# Patient Record
Sex: FEMALE | Employment: FULL TIME | URBAN - NONMETROPOLITAN AREA
[De-identification: names, ages, dates, MRNs, and addresses within clinical notes are randomized per-mention and may not be internally consistent; named-entity substitution may affect disease eponyms.]

---

## 2017-05-01 ENCOUNTER — RADIANT APPOINTMENT (OUTPATIENT)
Dept: GENERAL RADIOLOGY | Facility: CLINIC | Age: 57
End: 2017-05-01
Attending: NURSE PRACTITIONER
Payer: COMMERCIAL

## 2017-05-01 ENCOUNTER — OFFICE VISIT (OUTPATIENT)
Dept: FAMILY MEDICINE | Facility: CLINIC | Age: 57
End: 2017-05-01
Payer: COMMERCIAL

## 2017-05-01 VITALS
RESPIRATION RATE: 14 BRPM | HEART RATE: 82 BPM | SYSTOLIC BLOOD PRESSURE: 136 MMHG | WEIGHT: 144.2 LBS | TEMPERATURE: 97.5 F | DIASTOLIC BLOOD PRESSURE: 84 MMHG | BODY MASS INDEX: 27.25 KG/M2 | OXYGEN SATURATION: 98 %

## 2017-05-01 DIAGNOSIS — Z00.00 WELL FEMALE EXAM WITHOUT GYNECOLOGICAL EXAM: Primary | ICD-10-CM

## 2017-05-01 DIAGNOSIS — Z13.220 SCREENING FOR HYPERLIPIDEMIA: ICD-10-CM

## 2017-05-01 DIAGNOSIS — Z11.59 NEED FOR HEPATITIS C SCREENING TEST: ICD-10-CM

## 2017-05-01 DIAGNOSIS — Z82.61 FAMILY HISTORY OF RHEUMATOID ARTHRITIS: ICD-10-CM

## 2017-05-01 DIAGNOSIS — M79.642 PAIN IN BOTH HANDS: ICD-10-CM

## 2017-05-01 DIAGNOSIS — R21 RASH AND NONSPECIFIC SKIN ERUPTION: ICD-10-CM

## 2017-05-01 DIAGNOSIS — M79.641 PAIN IN BOTH HANDS: ICD-10-CM

## 2017-05-01 DIAGNOSIS — Z12.11 SCREEN FOR COLON CANCER: ICD-10-CM

## 2017-05-01 DIAGNOSIS — Z13.1 SCREENING FOR DIABETES MELLITUS: ICD-10-CM

## 2017-05-01 DIAGNOSIS — Z12.31 ENCOUNTER FOR SCREENING MAMMOGRAM FOR BREAST CANCER: ICD-10-CM

## 2017-05-01 LAB
ALBUMIN SERPL-MCNC: 3.7 G/DL (ref 3.4–5)
ALP SERPL-CCNC: 98 U/L (ref 40–150)
ALT SERPL W P-5'-P-CCNC: 41 U/L (ref 0–50)
ANION GAP SERPL CALCULATED.3IONS-SCNC: 6 MMOL/L (ref 3–14)
AST SERPL W P-5'-P-CCNC: 19 U/L (ref 0–45)
BILIRUB SERPL-MCNC: 0.4 MG/DL (ref 0.2–1.3)
BUN SERPL-MCNC: 14 MG/DL (ref 7–30)
CALCIUM SERPL-MCNC: 8.9 MG/DL (ref 8.5–10.1)
CHLORIDE SERPL-SCNC: 104 MMOL/L (ref 94–109)
CHOLEST SERPL-MCNC: 240 MG/DL
CO2 SERPL-SCNC: 28 MMOL/L (ref 20–32)
CREAT SERPL-MCNC: 0.6 MG/DL (ref 0.52–1.04)
ERYTHROCYTE [DISTWIDTH] IN BLOOD BY AUTOMATED COUNT: 13 % (ref 10–15)
GFR SERPL CREATININE-BSD FRML MDRD: ABNORMAL ML/MIN/1.7M2
GLUCOSE SERPL-MCNC: 119 MG/DL (ref 70–99)
HBA1C MFR BLD: 6.7 % (ref 4.3–6)
HCT VFR BLD AUTO: 39.2 % (ref 35–47)
HCV AB SERPL QL IA: NORMAL
HDLC SERPL-MCNC: 63 MG/DL
HGB BLD-MCNC: 12.8 G/DL (ref 11.7–15.7)
LDLC SERPL CALC-MCNC: 149 MG/DL
MCH RBC QN AUTO: 28.7 PG (ref 26.5–33)
MCHC RBC AUTO-ENTMCNC: 32.7 G/DL (ref 31.5–36.5)
MCV RBC AUTO: 88 FL (ref 78–100)
NONHDLC SERPL-MCNC: 177 MG/DL
PLATELET # BLD AUTO: 361 10E9/L (ref 150–450)
POTASSIUM SERPL-SCNC: 4.3 MMOL/L (ref 3.4–5.3)
PROT SERPL-MCNC: 7.5 G/DL (ref 6.8–8.8)
RBC # BLD AUTO: 4.46 10E12/L (ref 3.8–5.2)
SODIUM SERPL-SCNC: 138 MMOL/L (ref 133–144)
TRIGL SERPL-MCNC: 140 MG/DL
WBC # BLD AUTO: 9.3 10E9/L (ref 4–11)

## 2017-05-01 PROCEDURE — 83036 HEMOGLOBIN GLYCOSYLATED A1C: CPT | Performed by: NURSE PRACTITIONER

## 2017-05-01 PROCEDURE — 80053 COMPREHEN METABOLIC PANEL: CPT | Performed by: NURSE PRACTITIONER

## 2017-05-01 PROCEDURE — 99396 PREV VISIT EST AGE 40-64: CPT | Performed by: NURSE PRACTITIONER

## 2017-05-01 PROCEDURE — 85027 COMPLETE CBC AUTOMATED: CPT | Performed by: NURSE PRACTITIONER

## 2017-05-01 PROCEDURE — 86803 HEPATITIS C AB TEST: CPT | Performed by: NURSE PRACTITIONER

## 2017-05-01 PROCEDURE — 80061 LIPID PANEL: CPT | Performed by: NURSE PRACTITIONER

## 2017-05-01 PROCEDURE — 36415 COLL VENOUS BLD VENIPUNCTURE: CPT | Performed by: NURSE PRACTITIONER

## 2017-05-01 PROCEDURE — 73120 X-RAY EXAM OF HAND: CPT | Mod: LT

## 2017-05-01 NOTE — PROGRESS NOTES
SUBJECTIVE:     CC: Jen Benedict is an 56 year old woman who presents for preventive health visit.     Healthy Habits:    Do you get at least three servings of calcium containing foods daily (dairy, green leafy vegetables, etc.)? yes    Amount of exercise or daily activities, outside of work: 0.5 hour(s) per day    Problems taking medications regularly No    Medication side effects: No    Have you had an eye exam in the past two years? yes    Do you see a dentist twice per year? no    Do you have sleep apnea, excessive snoring or daytime drowsiness?no        Patient is fasting, she would like blood work done.   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    Recent Labs   Lab Test  04/21/15   0940   CHOL  234*   HDL  59   LDL  153*   TRIG  108   CHOLHDLRATIO  4.0       Reviewed orders with patient.  Reviewed health maintenance and updated orders accordingly - Yes    Mammo Decision Support:  Patient over age 50, mutual decision to screen reflected in health maintenance.    Pertinent mammograms are reviewed under the imaging tab.  History of abnormal Pap smear: Status post benign hysterectomy. Health Maintenance and Surgical History updated.    Reviewed and updated as needed this visit by clinical  staff  Tobacco  Allergies  Med Hx  Surg Hx  Fam Hx  Soc Hx        Reviewed and updated as needed this visit by Provider            ROS:  C: NEGATIVE for fever, chills, change in weight  I: NEGATIVE for worrisome rashes, moles or lesions  E: NEGATIVE for vision changes or irritation  ENT: NEGATIVE for ear, mouth and throat problems  R: NEGATIVE for significant cough or SOB  B: NEGATIVE for masses, tenderness or discharge  CV: NEGATIVE for chest pain, palpitations or peripheral edema  GI: NEGATIVE for nausea, abdominal pain, heartburn, or change in bowel habits  : NEGATIVE for unusual urinary or vaginal symptoms. No vaginal bleeding.  M: NEGATIVE for significant arthralgias or myalgia  N: NEGATIVE for weakness, dizziness or paresthesias  P: NEGATIVE for changes in mood or affect     Problem list, Medication list, Allergies, and Medical/Social/Surgical histories reviewed in EPIC and updated as appropriate.  OBJECTIVE:     /84 (BP Location: Right arm, Patient Position: Chair, Cuff Size: Adult Regular)  Pulse 82  Temp 97.5  F (36.4  C) (Tympanic)  Resp 14  Wt 144 lb 3.2 oz (65.4 kg)  SpO2 98%  BMI 27.25 kg/m2  EXAM:  GENERAL APPEARANCE: healthy, alert and no distress  EYES: Eyes grossly normal to inspection, PERRL and conjunctivae and sclerae normal  HENT: ear canals and TM's normal, nose and mouth without ulcers or lesions, oropharynx clear and oral mucous membranes moist  NECK: no adenopathy, no asymmetry, masses, or scars and thyroid normal to palpation  RESP: lungs clear to auscultation - no rales, rhonchi or wheezes  BREAST: normal without masses, tenderness or nipple discharge and no palpable axillary masses or adenopathy  CV: regular rate and rhythm, normal S1 S2, no S3 or S4, no murmur, click or rub, no peripheral edema and peripheral pulses strong  ABDOMEN: soft, nontender, no hepatosplenomegaly, no masses and bowel sounds normal  MS: swelling and pain of PIP joint of middle finger of left  hand   SKIN: very small pin point nodular rash below both eyes   NEURO: Normal strength and tone, sensory exam grossly normal, mentation intact and speech normal  PSYCH: mentation appears normal and affect normal/bright    ASSESSMENT/PLAN:       Patient Instructions   1. Well female exam without gynecological exam      2. Pain in both hands  Xray today   Blood work for RA was negative in 2015  Recommend seeing rheumatologist specifically for this       - XR Hand Bilateral 2 Views; Future  - RHEUMATOLOGY REFERRAL    3. Family history of rheumatoid arthritis  - XR Hand Bilateral 2 Views; Future  - RHEUMATOLOGY REFERRAL    4. Screening for hyperlipidemia  Labs today   - Lipid Profile      5. Screening for diabetes mellitus  Labs today   - Comprehensive metabolic panel  - Hemoglobin A1c    6. Rash and nonspecific skin eruption  See derm  - DERMATOLOGY REFERRAL    7. Screen for colon cancer  Return fit test  - Fecal colorectal cancer screen (FIT); Future    8. Encounter for screening mammogram for breast cancer    [place] [facility] Schedule    [place]- [phone]  Every Other Monday Morning    [place]- [phone]  Every other Monday Afternoon    [facility] [phone]  Every other Monday or Wednesday  & one Saturday a month    [place]- [phone]  Every Monday Morning  Every Tuesday Afternoon  All Day Wed, Thurs, Friday    9. Need for hepatitis C screening test  Labs today   - **Hepatitis C Screen Reflex to RNA FUTURE anytime    Preventive Health Recommendations  Female Ages 50 - 64    Yearly exam: See your health care provider every year in order to  o Review health changes.   o Discuss preventive care.    o Review your medicines if your doctor has prescribed any.      Get a Pap test every three years (unless you have an abnormal result and your provider advises testing more often).    If you get Pap tests with HPV test, you only need to test every 5 years, unless you have an abnormal result.     You do not  "need a Pap test if your uterus was removed (hysterectomy) and you have not had cancer.    You should be tested each year for STDs (sexually transmitted diseases) if you're at risk.     Have a mammogram every 1 to 2 years.    Have a colonoscopy at age 50, or have a yearly FIT test (stool test). These exams screen for colon cancer.      Have a cholesterol test every 5 years, or more often if advised.    Have a diabetes test (fasting glucose) every three years. If you are at risk for diabetes, you should have this test more often.     If you are at risk for osteoporosis (brittle bone disease), think about having a bone density scan (DEXA).    Shots: Get a flu shot each year. Get a tetanus shot every 10 years.    Nutrition:     Eat at least 5 servings of fruits and vegetables each day.    Eat whole-grain bread, whole-wheat pasta and brown rice instead of white grains and rice.    Talk to your provider about Calcium and Vitamin D.     Lifestyle    Exercise at least 150 minutes a week (30 minutes a day, 5 days a week). This will help you control your weight and prevent disease.    Limit alcohol to one drink per day.    No smoking.     Wear sunscreen to prevent skin cancer.     See your dentist every six months for an exam and cleaning.    See your eye doctor every 1 to 2 years.          COUNSELING:   Reviewed preventive health counseling, as reflected in patient instructions       Regular exercise       Healthy diet/nutrition       Colon cancer screening       Consider Hep C screening for patients born between 1945 and 1965         reports that she has never smoked. She has never used smokeless tobacco.    Estimated body mass index is 27.25 kg/(m^2) as calculated from the following:    Height as of 11/16/15: 5' 1\" (1.549 m).    Weight as of this encounter: 144 lb 3.2 oz (65.4 kg).   Weight management plan: Discussed healthy diet and exercise guidelines and patient will follow up in 3 months in clinic to " re-evaluate.    Counseling Resources:  ATP IV Guidelines  Pooled Cohorts Equation Calculator  Breast Cancer Risk Calculator  FRAX Risk Assessment  ICSI Preventive Guidelines  Dietary Guidelines for Americans, 2010  USDA's MyPlate  ASA Prophylaxis  Lung CA Screening    Elin Arboleda NP  [facility]

## 2017-05-01 NOTE — MR AVS SNAPSHOT
After Visit Summary   5/1/2017    Jen Benedict    MRN: [id number]           Patient Information     Date Of Birth          1960        Visit Information        Provider Department      5/1/2017 7:40 AM Elin Arboleda NP [facility]        Today's Diagnoses     Well female exam without gynecological exam    -  1    Pain in both hands        Family history of rheumatoid arthritis        Screening for hyperlipidemia        Screening for diabetes mellitus        Rash and nonspecific skin eruption        Screen for colon cancer        Encounter for screening mammogram for breast cancer        Need for hepatitis C screening test          Care Instructions    1. Well female exam without gynecological exam      2. Pain in both hands  Xray today   Blood work for RA was negative in 2015  Recommend seeing rheumatologist specifically for this       - XR Hand Bilateral 2 Views; Future  - RHEUMATOLOGY REFERRAL    3. Family history of rheumatoid arthritis  - XR Hand Bilateral 2 Views; Future  - RHEUMATOLOGY REFERRAL    4. Screening for hyperlipidemia  Labs today   - Lipid Profile      5. Screening for diabetes mellitus  Labs today   - Comprehensive metabolic panel  - Hemoglobin A1c    6. Rash and nonspecific skin eruption  See derm  - DERMATOLOGY REFERRAL    7. Screen for colon cancer  Return fit test  - Fecal colorectal cancer screen (FIT); Future    8. Encounter for screening mammogram for breast cancer    [place] [facility] Schedule    [place]- [phone]  Every Other Monday Morning    [place]- [phone]  Every other Monday Afternoon    [place] ~ [phone]  Every other Monday or Wednesday  & one Saturday a month    [place]- [phone]  Every Monday Morning  Every Tuesday Afternoon  All Day Wed, Thurs, Friday    9. Need for hepatitis C screening test  Labs today   - **Hepatitis C Screen Reflex to RNA FUTURE anytime    Preventive Health Recommendations  Female Ages 50 -  64    Yearly exam: See your health care provider every year in order to  o Review health changes.   o Discuss preventive care.    o Review your medicines if your doctor has prescribed any.      Get a Pap test every three years (unless you have an abnormal result and your provider advises testing more often).    If you get Pap tests with HPV test, you only need to test every 5 years, unless you have an abnormal result.     You do not need a Pap test if your uterus was removed (hysterectomy) and you have not had cancer.    You should be tested each year for STDs (sexually transmitted diseases) if you're at risk.     Have a mammogram every 1 to 2 years.    Have a colonoscopy at age 50, or have a yearly FIT test (stool test). These exams screen for colon cancer.      Have a cholesterol test every 5 years, or more often if advised.    Have a diabetes test (fasting glucose) every three years. If you are at risk for diabetes, you should have this test more often.     If you are at risk for osteoporosis (brittle bone disease), think about having a bone density scan (DEXA).    Shots: Get a flu shot each year. Get a tetanus shot every 10 years.    Nutrition:     Eat at least 5 servings of fruits and vegetables each day.    Eat whole-grain bread, whole-wheat pasta and brown rice instead of white grains and rice.    Talk to your provider about Calcium and Vitamin D.     Lifestyle    Exercise at least 150 minutes a week (30 minutes a day, 5 days a week). This will help you control your weight and prevent disease.    Limit alcohol to one drink per day.    No smoking.     Wear sunscreen to prevent skin cancer.     See your dentist every six months for an exam and cleaning.    See your eye doctor every 1 to 2 years.          Follow-ups after your visit        Additional Services     DERMATOLOGY REFERRAL       Your provider has referred you to: [facility]: [facility] [phone]    [email or web address]    Please be aware that coverage of these services is subject to the terms and limitations of your health insurance plan.  Call member services at your health plan with any benefit or coverage questions.      Please bring the following with you to your appointment:    (1) Any X-Rays, CTs or MRIs which have been performed.  Contact the facility where they were done to arrange for  prior to your scheduled appointment.    (2) List of current medications  (3) This referral request   (4) Any documents/labs given to you for this referral            RHEUMATOLOGY REFERRAL       Your provider has referred you to: [facility]: [facility] [phone]   [email or web address]    Please be aware that coverage of these services is subject to the terms and limitations of your health insurance plan.  Call member services at your health plan with any benefit or coverage questions.      Please bring the following with you to your appointment:    (1) Any X-Rays, CTs or MRIs which have been performed.  Contact the facility where they were done to arrange for  prior to your scheduled appointment.    (2) List of current medications   (3) This referral request   (4) Any documents/labs given to you for this referral                  Future tests that were ordered for you today     Open Future Orders        Priority Expected Expires Ordered    Fecal colorectal cancer screen (FIT) Routine 5/22/2017 7/24/2017 5/1/2017    XR Hand Bilateral 2 Views Routine 5/1/2017 5/1/2018 5/1/2017            Who to contact     If you have questions or need follow up information about today's clinic visit or your schedule please contact [facility] directly at [phone].  Normal or non-critical lab and imaging results will be communicated to you by MyChart, letter or phone within 4 business days after the clinic has received the results. If you do  not hear from us within 7 days, please contact the clinic through alike or phone. If you have a critical or abnormal lab result, we will notify you by phone as soon as possible.  Submit refill requests through alike or call your pharmacy and they will forward the refill request to us. Please allow 3 business days for your refill to be completed.          Additional Information About Your Visit        [employer] Information     alike gives you secure access to your electronic health record. If you see a primary care provider, you can also send messages to your care team and make appointments. If you have questions, please call your primary care clinic.  If you do not have a primary care provider, please call [phone] and they will assist you.        Care EveryWhere ID     This is your Care EveryWhere ID. This could be used by other organizations to access your [facility] medical records  [id number]        Your Vitals Were     Pulse Temperature Respirations Pulse Oximetry BMI (Body Mass Index)       82 97.5  F (36.4  C) (Tympanic) 14 98% 27.25 kg/m2        Blood Pressure from Last 3 Encounters:   05/01/17 136/84   11/16/15 134/78   11/16/15 118/67    Weight from Last 3 Encounters:   05/01/17 144 lb 3.2 oz (65.4 kg)   11/16/15 136 lb 12.8 oz (62.1 kg)   10/01/15 135 lb 4 oz (61.3 kg)              We Performed the Following     **Hepatitis C Screen Reflex to RNA FUTURE anytime     CBC with platelets     Comprehensive metabolic panel     DERMATOLOGY REFERRAL     Hemoglobin A1c     Lipid Profile     RHEUMATOLOGY REFERRAL        Primary Care Provider Office Phone # Fax #    Barbara Washington [phone]       [address] [address]        Thank you!     Thank you for choosing [facility]  for your care. Our goal is always to provide you with excellent care. Hearing back from our patients is one way we can continue to improve our  services. Please take a few minutes to complete the written survey that you may receive in the mail after your visit with us. Thank you!             Your Updated Medication List - Protect others around you: Learn how to safely use, store and throw away your medicines at www.disposemymeds.org.          This list is accurate as of: 5/1/17  8:19 AM.  Always use your most recent med list.                   Brand Name Dispense Instructions for use    CALCIUM + D PO      Take 1,200 mg by mouth daily       omega 3 1000 MG Caps     90 capsule    Take 1 g by mouth 2 times daily

## 2017-05-01 NOTE — PATIENT INSTRUCTIONS
1. Well female exam without gynecological exam      2. Pain in both hands  Xray today   Blood work for RA was negative in 2015  Recommend seeing rheumatologist specifically for this       - XR Hand Bilateral 2 Views; Future  - RHEUMATOLOGY REFERRAL    3. Family history of rheumatoid arthritis  - XR Hand Bilateral 2 Views; Future  - RHEUMATOLOGY REFERRAL    4. Screening for hyperlipidemia  Labs today   - Lipid Profile      5. Screening for diabetes mellitus  Labs today   - Comprehensive metabolic panel  - Hemoglobin A1c    6. Rash and nonspecific skin eruption  See derm  - DERMATOLOGY REFERRAL    7. Screen for colon cancer  Return fit test  - Fecal colorectal cancer screen (FIT); Future    8. Encounter for screening mammogram for breast cancer    [place] [facility] Schedule    [place]- [phone]  Every Other Monday Morning    [place]- [phone]  Every other Monday Afternoon    [facility] [phone]  Every other Monday or Wednesday  & one Saturday a month    [place]- [phone]  Every Monday Morning  Every Tuesday Afternoon  All Day Wed, Thurs, Friday    9. Need for hepatitis C screening test  Labs today   - **Hepatitis C Screen Reflex to RNA FUTURE anytime    Preventive Health Recommendations  Female Ages 50 - 64    Yearly exam: See your health care provider every year in order to  o Review health changes.   o Discuss preventive care.    o Review your medicines if your doctor has prescribed any.      Get a Pap test every three years (unless you have an abnormal result and your provider advises testing more often).    If you get Pap tests with HPV test, you only need to test every 5 years, unless you have an abnormal result.     You do not need a Pap test if your uterus was removed (hysterectomy) and you have not had cancer.    You should be tested each year for STDs (sexually transmitted diseases) if you're at risk.     Have a mammogram every 1 to 2 years.    Have a colonoscopy at age 50, or have a  yearly FIT test (stool test). These exams screen for colon cancer.      Have a cholesterol test every 5 years, or more often if advised.    Have a diabetes test (fasting glucose) every three years. If you are at risk for diabetes, you should have this test more often.     If you are at risk for osteoporosis (brittle bone disease), think about having a bone density scan (DEXA).    Shots: Get a flu shot each year. Get a tetanus shot every 10 years.    Nutrition:     Eat at least 5 servings of fruits and vegetables each day.    Eat whole-grain bread, whole-wheat pasta and brown rice instead of white grains and rice.    Talk to your provider about Calcium and Vitamin D.     Lifestyle    Exercise at least 150 minutes a week (30 minutes a day, 5 days a week). This will help you control your weight and prevent disease.    Limit alcohol to one drink per day.    No smoking.     Wear sunscreen to prevent skin cancer.     See your dentist every six months for an exam and cleaning.    See your eye doctor every 1 to 2 years.

## 2017-05-04 PROCEDURE — 82274 ASSAY TEST FOR BLOOD FECAL: CPT | Performed by: NURSE PRACTITIONER

## 2017-05-05 ENCOUNTER — OFFICE VISIT (OUTPATIENT)
Dept: FAMILY MEDICINE | Facility: CLINIC | Age: 57
End: 2017-05-05
Payer: COMMERCIAL

## 2017-05-05 VITALS
WEIGHT: 144 LBS | TEMPERATURE: 98.8 F | HEART RATE: 81 BPM | OXYGEN SATURATION: 95 % | RESPIRATION RATE: 18 BRPM | SYSTOLIC BLOOD PRESSURE: 132 MMHG | BODY MASS INDEX: 27.21 KG/M2 | DIASTOLIC BLOOD PRESSURE: 80 MMHG

## 2017-05-05 DIAGNOSIS — E11.9 TYPE 2 DIABETES MELLITUS WITHOUT COMPLICATION, WITHOUT LONG-TERM CURRENT USE OF INSULIN (H): Primary | ICD-10-CM

## 2017-05-05 DIAGNOSIS — Z12.11 SCREEN FOR COLON CANCER: ICD-10-CM

## 2017-05-05 LAB
CREAT UR-MCNC: 117 MG/DL
HEMOCCULT STL QL IA: NEGATIVE
MICROALBUMIN UR-MCNC: 9 MG/L
MICROALBUMIN/CREAT UR: 7.31 MG/G CR (ref 0–25)

## 2017-05-05 PROCEDURE — 90732 PPSV23 VACC 2 YRS+ SUBQ/IM: CPT | Performed by: NURSE PRACTITIONER

## 2017-05-05 PROCEDURE — 90471 IMMUNIZATION ADMIN: CPT | Performed by: NURSE PRACTITIONER

## 2017-05-05 PROCEDURE — 99207 C FOOT EXAM  NO CHARGE: CPT | Performed by: NURSE PRACTITIONER

## 2017-05-05 PROCEDURE — 82043 UR ALBUMIN QUANTITATIVE: CPT | Performed by: NURSE PRACTITIONER

## 2017-05-05 PROCEDURE — 99213 OFFICE O/P EST LOW 20 MIN: CPT | Mod: 25 | Performed by: NURSE PRACTITIONER

## 2017-05-05 RX ORDER — LISINOPRIL 2.5 MG/1
2.5 TABLET ORAL DAILY
Qty: 90 TABLET | Refills: 1 | Status: SHIPPED | OUTPATIENT
Start: 2017-05-05 | End: 2017-05-11

## 2017-05-05 RX ORDER — ATORVASTATIN CALCIUM 10 MG/1
10 TABLET, FILM COATED ORAL DAILY
Qty: 90 TABLET | Refills: 3 | Status: SHIPPED | OUTPATIENT
Start: 2017-05-05 | End: 2017-11-27

## 2017-05-05 NOTE — MR AVS SNAPSHOT
After Visit Summary   5/5/2017    Jen Benedict    MRN: [id number]           Patient Information     Date Of Birth          1960        Visit Information        Provider Department      5/5/2017 2:00 PM Elin Arboleda NP [facility]        Today's Diagnoses     Type 2 diabetes mellitus without complication, without long-term current use of insulin (H)    -  1      Care Instructions    Recommend getting an eye exam when you are due- send copy of results     Will get urine today to look for protein     Start low dose of lisinopril 2.5 mg to protect kidneys take in AM   Check blood pressure in 2 weeks at home     Start low dose of cholesterol medication to prevent heart attack and stroke   atorvastatin 10 mg daily at bedtime    Work hard on diet and exercise to help control the diabetes    Check blood sugar 2 times a week in AM     Goal is to be below 150     See me back in 3 months   Come fasting for lab work               Follow-ups after your visit        Who to contact     If you have questions or need follow up information about today's clinic visit or your schedule please contact [facility] directly at [phone].  Normal or non-critical lab and imaging results will be communicated to you by creadshart, letter or phone within 4 business days after the clinic has received the results. If you do not hear from us within 7 days, please contact the clinic through creadshart or phone. If you have a critical or abnormal lab result, we will notify you by phone as soon as possible.  Submit refill requests through [employer] or call your pharmacy and they will forward the refill request to us. Please allow 3 business days for your refill to be completed.          Additional Information About Your Visit        MyChart Information     [employer] gives you secure access to your electronic health record. If you see a primary care provider, you can also send messages to your care  team and make appointments. If you have questions, please call your primary care clinic.  If you do not have a primary care provider, please call [phone] and they will assist you.        Care EveryWhere ID     This is your Care EveryWhere ID. This could be used by other organizations to access your [facility] medical records  [id number]        Your Vitals Were     Pulse Temperature Respirations Pulse Oximetry BMI (Body Mass Index)       81 98.8  F (37.1  C) (Tympanic) 18 95% 27.21 kg/m2        Blood Pressure from Last 3 Encounters:   05/05/17 132/80   05/01/17 136/84   11/16/15 134/78    Weight from Last 3 Encounters:   05/05/17 144 lb (65.3 kg)   05/01/17 144 lb 3.2 oz (65.4 kg)   11/16/15 136 lb 12.8 oz (62.1 kg)              Today, you had the following     No orders found for display         Today's Medication Changes          These changes are accurate as of: 5/5/17  2:29 PM.  If you have any questions, ask your nurse or doctor.               Start taking these medicines.        Dose/Directions    atorvastatin 10 MG tablet   Commonly known as:  LIPITOR   Used for:  Type 2 diabetes mellitus without complication, without long-term current use of insulin (H)   Started by:  Elin Arboleda NP        Dose:  10 mg   Take 1 tablet (10 mg) by mouth daily   Quantity:  90 tablet   Refills:  3       lisinopril 2.5 MG tablet   Commonly known as:  PRINIVIL/Zestril   Used for:  Type 2 diabetes mellitus without complication, without long-term current use of insulin (H)   Started by:  Elin Arboleda NP        Dose:  2.5 mg   Take 1 tablet (2.5 mg) by mouth daily   Quantity:  90 tablet   Refills:  1            Where to get your medicines      These medications were sent to [facility] [address] [place] [address]  [address] [facility] [address]     Phone:  [phone]     atorvastatin 10 MG tablet    lisinopril 2.5 MG tablet                Primary Care Provider Office Phone # Fax #    Elin  JINA Arboleda [phone]       [facility] [address]  [facility] [address]        Thank you!     Thank you for choosing [facility]  for your care. Our goal is always to provide you with excellent care. Hearing back from our patients is one way we can continue to improve our services. Please take a few minutes to complete the written survey that you may receive in the mail after your visit with us. Thank you!             Your Updated Medication List - Protect others around you: Learn how to safely use, store and throw away your medicines at www.disposemymeds.org.          This list is accurate as of: 5/5/17  2:29 PM.  Always use your most recent med list.                   Brand Name Dispense Instructions for use    atorvastatin 10 MG tablet    LIPITOR    90 tablet    Take 1 tablet (10 mg) by mouth daily       CALCIUM + D PO      Take 1,200 mg by mouth daily       lisinopril 2.5 MG tablet    PRINIVIL/Zestril    90 tablet    Take 1 tablet (2.5 mg) by mouth daily       omega 3 1000 MG Caps     90 capsule    Take 1 g by mouth 2 times daily

## 2017-05-05 NOTE — PATIENT INSTRUCTIONS
Recommend getting an eye exam when you are due- send copy of results     Will get urine today to look for protein     Start low dose of lisinopril 2.5 mg to protect kidneys take in AM   Check blood pressure in 2 weeks at home     Start low dose of cholesterol medication to prevent heart attack and stroke   atorvastatin 10 mg daily at bedtime    Work hard on diet and exercise to help control the diabetes    Check blood sugar 2 times a week in AM     Goal is to be below 150     See me back in 3 months   Come fasting for lab work

## 2017-05-05 NOTE — NURSING NOTE
"Chief Complaint   Patient presents with     Results       Initial /80 (BP Location: Right arm, Patient Position: Chair, Cuff Size: Adult Regular)  Pulse 81  Temp 98.8  F (37.1  C) (Tympanic)  Resp 18  Wt 144 lb (65.3 kg)  SpO2 95%  BMI 27.21 kg/m2 Estimated body mass index is 27.21 kg/(m^2) as calculated from the following:    Height as of 11/16/15: 5' 1\" (1.549 m).    Weight as of this encounter: 144 lb (65.3 kg).  Medication Reconciliation: complete    Health Maintenance that is potentially due pending provider review:    Foot exam, eye exam, microalbumin, pneumovax, fit and tsh  Will discuss with provider.      "

## 2017-05-05 NOTE — NURSING NOTE
Screening Questionnaire for Adult Immunization    Are you sick today?   No   Do you have allergies to medications, food, a vaccine component or latex?   No   Have you ever had a serious reaction after receiving a vaccination?   No   Do you have a long-term health problem with heart disease, lung disease, asthma, kidney disease, metabolic disease (e.g. diabetes), anemia, or other blood disorder?   diabetes   Do you have cancer, leukemia, HIV/AIDS, or any other immune system problem?   No   In the past 3 months, have you taken medications that affect  your immune system, such as prednisone, other steroids, or anticancer drugs; drugs for the treatment of rheumatoid arthritis, Crohn s disease, or psoriasis; or have you had radiation treatments?   No   Have you had a seizure, or a brain or other nervous system problem?   No   During the past year, have you received a transfusion of blood or blood     products, or been given immune (gamma) globulin or antiviral drug?   No   For women: Are you pregnant or is there a chance you could become        pregnant during the next month?   No   Have you received any vaccinations in the past 4 weeks?   No     Immunization questionnaire was positive for at least one answer.  Notified Elin Mayer.      MNVFC doesn't apply on this patient    Per orders of Dr. Elin Arboleda, injection of Pneumovaxx 23 given by Leilani Martini. Patient instructed to remain in clinic for 20 minutes afterwards, and to report any adverse reaction to me immediately.       Screening performed by Leilani Martini on 5/5/2017 at 2:31 PM.

## 2017-05-05 NOTE — PROGRESS NOTES
SUBJECTIVE:                                                    Jen Benedict is a 56 year old female who presents to clinic today for the following health issues:      Here to go over lab results from 5/1/17    New diabetic   Has been in prediabetic range   Has seen diabetic education through [facility]   States she feels she has knowledge it is now a matter of applying it    Lab Results   Component Value Date    A1C 6.7 05/01/2017         Problem list and histories reviewed & adjusted, as indicated.  Additional history: as documented    Labs reviewed in EPIC    Reviewed and updated as needed this visit by clinical staff  Tobacco  Allergies  Med Hx  Surg Hx  Fam Hx  Soc Hx      Reviewed and updated as needed this visit by Provider         ROS:  Constitutional, HEENT, cardiovascular, pulmonary, gi and gu systems are negative, except as otherwise noted.    OBJECTIVE:                                                    /80 (BP Location: Right arm, Patient Position: Chair, Cuff Size: Adult Regular)  Pulse 81  Temp 98.8  F (37.1  C) (Tympanic)  Resp 18  Wt 144 lb (65.3 kg)  SpO2 95%  BMI 27.21 kg/m2  Body mass index is 27.21 kg/(m^2).  GENERAL APPEARANCE: healthy, alert and no distress  DIABETIC FOOT EXAM: normal DP and PT pulses, no trophic changes or ulcerative lesions and normal sensory exam    Diagnostic test results:  Diagnostic Test Results:  Results for orders placed or performed in visit on 05/05/17   ** Albumin Random Urine Quant FUTURE 1yr   Result Value Ref Range    Creatinine Urine 117 mg/dL    Albumin Urine mg/L 9 mg/L    Albumin Urine mg/g Cr 7.31 0 - 25 mg/g Cr        ASSESSMENT/PLAN:                                                    1. Type 2 diabetes mellitus without complication, without long-term current use of insulin (H)  a1c is with in goal   Spent today discussing statin and ace recommendation   Also discussed metformin which she prefers to hold off on for now   She plans on  working on diet and exercise   Declined seeing diabetic educator today   Plan will be to see her back in 3 months for a recheck   Pneumonia vaccine given today   Will start ace and statin   - atorvastatin (LIPITOR) 10 MG tablet; Take 1 tablet (10 mg) by mouth daily  Dispense: 90 tablet; Refill: 3  - lisinopril (PRINIVIL/ZESTRIL) 2.5 MG tablet; Take 1 tablet (2.5 mg) by mouth daily  Dispense: 90 tablet; Refill: 1  - ** Albumin Random Urine Quant FUTURE 1yr  - blood glucose monitoring (NO BRAND SPECIFIED) test strip; Use to test blood sugars daily or as directed  Dispense: 100 strip; Refill: 3  - Pneumococcal vaccine 23 valent PPSV23  (Pneumovax) [68937]      Patient Instructions   Recommend getting an eye exam when you are due- send copy of results     Will get urine today to look for protein     Start low dose of lisinopril 2.5 mg to protect kidneys take in AM   Check blood pressure in 2 weeks at home     Start low dose of cholesterol medication to prevent heart attack and stroke   atorvastatin 10 mg daily at bedtime    Work hard on diet and exercise to help control the diabetes    Check blood sugar 2 times a week in AM     Goal is to be below 150     See me back in 3 months   Come fasting for lab work             Elin Arboleda NP  [facility]    The 10-year ASCVD risk score (Mariibhavik CARMONA Jr, et al., 2013) is: 3%    Values used to calculate the score:      Age: 56 years      Sex: Female      Is Non- : No      Diabetic: No      Tobacco smoker: No      Systolic Blood Pressure: 132 mmHg      Is BP treated: No      HDL Cholesterol: 63 mg/dL      Total Cholesterol: 240 mg/dL

## 2017-05-10 ENCOUNTER — TELEPHONE (OUTPATIENT)
Dept: FAMILY MEDICINE | Facility: CLINIC | Age: 57
End: 2017-05-10

## 2017-05-10 ENCOUNTER — MYC MEDICAL ADVICE (OUTPATIENT)
Dept: FAMILY MEDICINE | Facility: CLINIC | Age: 57
End: 2017-05-10

## 2017-05-10 DIAGNOSIS — E11.9 TYPE 2 DIABETES MELLITUS WITHOUT COMPLICATION, WITHOUT LONG-TERM CURRENT USE OF INSULIN (H): Primary | ICD-10-CM

## 2017-05-10 NOTE — TELEPHONE ENCOUNTER
Patient called stating that pharmacy has not received her prescription and would like it faxed over again.    Ilya WAGNER  Central Scheduling

## 2017-05-11 ENCOUNTER — MYC MEDICAL ADVICE (OUTPATIENT)
Dept: FAMILY MEDICINE | Facility: CLINIC | Age: 57
End: 2017-05-11

## 2017-05-11 DIAGNOSIS — E11.9 TYPE 2 DIABETES MELLITUS WITHOUT COMPLICATION, WITHOUT LONG-TERM CURRENT USE OF INSULIN (H): Primary | ICD-10-CM

## 2017-05-11 RX ORDER — LOSARTAN POTASSIUM 25 MG/1
12.5 TABLET ORAL DAILY
Qty: 45 TABLET | Refills: 1 | Status: SHIPPED | OUTPATIENT
Start: 2017-05-11 | End: 2017-05-22

## 2017-05-11 NOTE — TELEPHONE ENCOUNTER
Spoke with pt who reports onset dry tickle cough with start of lisinopril per 05-05-17 OV.  Please advise.  KRISTY Swenson RN

## 2017-05-11 NOTE — TELEPHONE ENCOUNTER
Lancets      Last Written Prescription Date: not on med list  Last Fill Quantity: ?,  # refills: ?   Last Office Visit with [facility], [facility] or [facility] prescribing provider: 5/5/2017

## 2017-05-14 ENCOUNTER — MYC MEDICAL ADVICE (OUTPATIENT)
Dept: FAMILY MEDICINE | Facility: CLINIC | Age: 57
End: 2017-05-14

## 2017-05-15 NOTE — TELEPHONE ENCOUNTER
Spoke with pt who states dry, tickle cough about the same after switching from lisinopril to losartan 05-12-17.   States cough us random occurring at various times of day/ hunter.  States does have allergy sx, denies URI/ sinus sx.  Currently in [place] until end of month. Advised to continue losartan, check in with nurse again in 1 wk or sooner as needed to see if cough persists.  States may be able to check BP in 1 wk at when visiting sister.  Nurse only BP check when returns home end of month.   Pt voices understanding/ agreement.  Await pt call back/ [employer] message in 1 wk.  KRISTY Swenson RN

## 2017-05-22 ENCOUNTER — TELEPHONE (OUTPATIENT)
Dept: FAMILY MEDICINE | Facility: CLINIC | Age: 57
End: 2017-05-22

## 2017-05-22 DIAGNOSIS — E11.9 TYPE 2 DIABETES MELLITUS WITHOUT COMPLICATION, WITHOUT LONG-TERM CURRENT USE OF INSULIN (H): Primary | ICD-10-CM

## 2017-05-22 NOTE — TELEPHONE ENCOUNTER
Jen calling stating still having tickle cough and would like to stop taking Losartan 25mg. States will be back to [place] Monday 06.05.2017. Please call    Holly Marti CSS

## 2017-05-22 NOTE — TELEPHONE ENCOUNTER
Per 05-14-17 OV-    Spoke with pt who states dry, tickle cough about the same after switching from lisinopril to losartan 05-12-17.   States cough us random occurring at various times of day/ hunter.  States does have allergy sx, denies URI/ sinus sx.  Currently in [place] until end of month. Advised to continue losartan, check in with nurse again in 1 wk or sooner as needed to see if cough persists.  States may be able to check BP in 1 wk at when visiting sister.  Nurse only BP check when returns home end of month.   Pt voices understanding/ agreement.  Await pt call back/ [employer] message in 1 wk.  KRISTY Swenson RN      Pt calling back to report persistent tickle cough after changing from lisinopril to losartan.  Will be back to [place] 06-05-17.  Please advise.  KRISTY Swenson RN

## 2017-06-02 ENCOUNTER — OFFICE VISIT (OUTPATIENT)
Dept: FAMILY MEDICINE | Facility: CLINIC | Age: 57
End: 2017-06-02
Payer: COMMERCIAL

## 2017-06-02 VITALS
OXYGEN SATURATION: 97 % | BODY MASS INDEX: 24.94 KG/M2 | HEART RATE: 77 BPM | DIASTOLIC BLOOD PRESSURE: 80 MMHG | TEMPERATURE: 99 F | WEIGHT: 132 LBS | SYSTOLIC BLOOD PRESSURE: 130 MMHG | RESPIRATION RATE: 16 BRPM

## 2017-06-02 DIAGNOSIS — T46.4X5A COUGH DUE TO ACE INHIBITOR: ICD-10-CM

## 2017-06-02 DIAGNOSIS — E11.9 TYPE 2 DIABETES MELLITUS WITHOUT COMPLICATION, WITHOUT LONG-TERM CURRENT USE OF INSULIN (H): Primary | ICD-10-CM

## 2017-06-02 DIAGNOSIS — R05.8 COUGH DUE TO ACE INHIBITOR: ICD-10-CM

## 2017-06-02 PROCEDURE — 99213 OFFICE O/P EST LOW 20 MIN: CPT | Performed by: NURSE PRACTITIONER

## 2017-06-02 NOTE — NURSING NOTE
"Chief Complaint   Patient presents with     Medication Problem       Initial BP (!) 152/94 (BP Location: Right arm, Patient Position: Chair, Cuff Size: Adult Regular)  Pulse 77  Temp 99  F (37.2  C) (Tympanic)  Resp 16  Wt 132 lb (59.9 kg)  SpO2 97%  BMI 24.94 kg/m2 Estimated body mass index is 24.94 kg/(m^2) as calculated from the following:    Height as of 11/16/15: 5' 1\" (1.549 m).    Weight as of this encounter: 132 lb (59.9 kg).  Medication Reconciliation: complete    Health Maintenance that is potentially due pending provider review:  Eye exam, tsh    Will discuss with provider.      "

## 2017-06-02 NOTE — PATIENT INSTRUCTIONS
Continue on the atorvastatin only today     Let me know if cough does not completely resolve in 2-3 weeks.      See me back in August       Keep up the hard work with diet and exercise

## 2017-06-02 NOTE — MR AVS SNAPSHOT
After Visit Summary   6/2/2017    Jen Benedict    MRN: [id number]           Patient Information     Date Of Birth          1960        Visit Information        Provider Department      6/2/2017 12:40 PM Elin Arboleda NP [facility]        Care Instructions    Continue on the atorvastatin only today     Let me know if cough does not completely resolve in 2-3 weeks.      See me back in August       Keep up the hard work with diet and exercise                  Follow-ups after your visit        Who to contact     If you have questions or need follow up information about today's clinic visit or your schedule please contact [facility] directly at [phone].  Normal or non-critical lab and imaging results will be communicated to you by Adworxhart, letter or phone within 4 business days after the clinic has received the results. If you do not hear from us within 7 days, please contact the clinic through Adworxhart or phone. If you have a critical or abnormal lab result, we will notify you by phone as soon as possible.  Submit refill requests through [employer] or call your pharmacy and they will forward the refill request to us. Please allow 3 business days for your refill to be completed.          Additional Information About Your Visit        MyChart Information     [employer] gives you secure access to your electronic health record. If you see a primary care provider, you can also send messages to your care team and make appointments. If you have questions, please call your primary care clinic.  If you do not have a primary care provider, please call [phone] and they will assist you.        Care EveryWhere ID     This is your Care EveryWhere ID. This could be used by other organizations to access your [facility] medical records  [id number]        Your Vitals Were     Pulse Temperature Respirations Pulse Oximetry BMI (Body Mass Index)       77 99  F (37.2  C)  (Tympanic) 16 97% 24.94 kg/m2        Blood Pressure from Last 3 Encounters:   06/02/17 (!) 152/94   05/05/17 132/80   05/01/17 136/84    Weight from Last 3 Encounters:   06/02/17 132 lb (59.9 kg)   05/05/17 144 lb (65.3 kg)   05/01/17 144 lb 3.2 oz (65.4 kg)              Today, you had the following     No orders found for display       Primary Care Provider Office Phone # Fax #    Elin ArboledaJINA [phone]       [facility] [address] [facility] [address]        Thank you!     Thank you for choosing [facility]  for your care. Our goal is always to provide you with excellent care. Hearing back from our patients is one way we can continue to improve our services. Please take a few minutes to complete the written survey that you may receive in the mail after your visit with us. Thank you!             Your Updated Medication List - Protect others around you: Learn how to safely use, store and throw away your medicines at www.disposemymeds.org.          This list is accurate as of: 6/2/17  1:01 PM.  Always use your most recent med list.                   Brand Name Dispense Instructions for use    ACE/ARB NOT PRESCRIBED (INTENTIONAL)      Please choose reason not prescribed, below       atorvastatin 10 MG tablet    LIPITOR    90 tablet    Take 1 tablet (10 mg) by mouth daily       blood glucose lancets standard    no brand specified    1 Box    Use to test blood sugar one time daily or as directed.       blood glucose monitoring test strip    no brand specified    100 strip    Use to test blood sugars daily or as directed       CALCIUM + D PO      Take 1,200 mg by mouth daily       omega 3 1000 MG Caps     90 capsule    Take 1 g by mouth 2 times daily

## 2017-06-02 NOTE — PROGRESS NOTES
SUBJECTIVE:                                                    Jen Benedict is a 56 year old female who presents to clinic today for the following health issues:      Medication Followup of  Losartan Lisinopril    Taking Medication as prescribed: NO    Side Effects:  Yes, cough, still has cough, mostly at night    Medication Helping Symptoms:  NO   No longer taking tried both lisinopril and losartan     Diabetes Follow-up    Patient is checking blood sugars: twice daily.    Blood sugar testing frequency justification: Patient modifying lifestyle changes (diet, exercise) with blood sugars  Results are as follows:     had a 151 and 154 correlated with eating large portion of bread      Symptoms of hypoglycemia (low blood sugar): none     Paresthesias (numbness or burning in feet) or sores: No     Hyperlipidemia Follow-Up      Rate your low fat/cholesterol diet?: good    Taking statin?  Yes, no muscle aches from statin    Other lipid medications/supplements?:  none         Problem list and histories reviewed & adjusted, as indicated.  Additional history: as documented    Labs reviewed in EPIC    Reviewed and updated as needed this visit by clinical staff  Tobacco  Allergies  Med Hx  Surg Hx  Fam Hx  Soc Hx      Reviewed and updated as needed this visit by Provider         ROS:  Constitutional, HEENT, cardiovascular, pulmonary, gi and gu systems are negative, except as otherwise noted.    OBJECTIVE:                                                    /80  Pulse 77  Temp 99  F (37.2  C) (Tympanic)  Resp 16  Wt 132 lb (59.9 kg)  SpO2 97%  BMI 24.94 kg/m2  Body mass index is 24.94 kg/(m^2).  GENERAL APPEARANCE: healthy, alert and no distress  HENT: ear canals and TM's normal and nose and mouth without ulcers or lesions  RESP: lungs clear to auscultation - no rales, rhonchi or wheezes  CV: regular rates and rhythm, normal S1 S2, no S3 or S4 and no murmur, click or rub  ABDOMEN: soft, nontender,  without hepatosplenomegaly or masses and bowel sounds normal    Diagnostic test results:  Diagnostic Test Results:  none      ASSESSMENT/PLAN:                                                    1. Type 2 diabetes mellitus without complication, without long-term current use of insulin (H)  Lab Results   Component Value Date    A1C 6.7 05/01/2017     New diagnosis   She is doing great with diet and exercise and checking BG levels    She has lost weight   Wt Readings from Last 10 Encounters:   06/02/17 132 lb (59.9 kg)   05/05/17 144 lb (65.3 kg)   05/01/17 144 lb 3.2 oz (65.4 kg)   11/16/15 136 lb 12.8 oz (62.1 kg)   10/01/15 135 lb 4 oz (61.3 kg)   09/17/15 134 lb (60.8 kg)   08/21/15 133 lb (60.3 kg)   07/29/15 133 lb (60.3 kg)   07/22/15 133 lb (60.3 kg)   07/07/15 132 lb (59.9 kg)     tolerating statin   Did not tolerate ACE/ARB due to cough- added to allergy list   I will see her back in 3 months for repeat labs them     2. Cough due to ACE inhibitor        Patient Instructions   Continue on the atorvastatin only today     Let me know if cough does not completely resolve in 2-3 weeks.      See me back in August       Keep up the hard work with diet and exercise              Elin Arboleda NP  [facility]

## 2017-06-04 ENCOUNTER — MYC MEDICAL ADVICE (OUTPATIENT)
Dept: FAMILY MEDICINE | Facility: CLINIC | Age: 57
End: 2017-06-04

## 2017-06-05 PROBLEM — E11.9 TYPE 2 DIABETES MELLITUS WITHOUT COMPLICATION, WITHOUT LONG-TERM CURRENT USE OF INSULIN (H): Status: ACTIVE | Noted: 2017-06-05

## 2017-07-10 ENCOUNTER — OFFICE VISIT (OUTPATIENT)
Dept: DERMATOLOGY | Facility: CLINIC | Age: 57
End: 2017-07-10
Payer: COMMERCIAL

## 2017-07-10 VITALS — DIASTOLIC BLOOD PRESSURE: 90 MMHG | SYSTOLIC BLOOD PRESSURE: 143 MMHG | OXYGEN SATURATION: 96 % | HEART RATE: 68 BPM

## 2017-07-10 DIAGNOSIS — L82.1 DERMATOSIS PAPULOSA NIGRA: Primary | ICD-10-CM

## 2017-07-10 PROCEDURE — 96999 UNLISTED SPEC DERM SVC/PX: CPT | Performed by: DERMATOLOGY

## 2017-07-10 PROCEDURE — 99213 OFFICE O/P EST LOW 20 MIN: CPT | Performed by: DERMATOLOGY

## 2017-07-10 NOTE — PATIENT INSTRUCTIONS
Proper skin care from Dr. Munoz- [facility]     Eliminate harsh soaps, i.e. Dial, Zest, Aisha Spring;   Use mild soaps such as Cetaphil or Dove Sensitive Skin   Avoid hot or cold showers   After showering, lightly dry off.    Aggressive use of a moisturizer (including Vanicream, Cetaphil, Aquafor or Cerave)   We recommend using a tub that needs to be scooped out, not a pump. This has more of an oil base. It will hold moisture in your skin much better than a water base moisturizer. The ones recommended are non- pore clogging.       If you have any questions call [phone] and follow the prompts to Dr. Munoz's office.             Wound Care Instructions     FOR SUPERFICIAL WOUNDS     [facility] [phone]    [facility] [phone]                       AFTER 24 HOURS YOU SHOULD REMOVE THE BANDAGE AND BEGIN DAILY DRESSING CHANGES AS FOLLOWS:     1) Remove Dressing.     2) Clean and dry the area with tap water using a Q-tip or sterile gauze pad.     3) Apply Vaseline, Aquaphor, Polysporin ointment or Bacitracin ointment over entire wound.  Do NOT use Neosporin ointment.     4) Cover the wound with a band-aid, or a sterile non-stick gauze pad and micropore paper tape      REPEAT THESE INSTRUCTIONS AT LEAST ONCE A DAY UNTIL THE WOUND HAS COMPLETELY HEALED.    It is an old wives tale that a wound heals better when it is exposed to air and allowed to dry out. The wound will heal faster with a better cosmetic result if it is kept moist with ointment and covered with a bandage.    **Do not let the wound dry out.**      Supplies Needed:      *Cotton tipped applicators (Q-tips)    *Polysporin Ointment or Bacitracin Ointment (NOT NEOSPORIN)    *Band-aids or non-stick gauze pads and micropore paper tape.      PATIENT INFORMATION:    During the healing process you will notice a number of changes. All wounds develop a small halo of redness surrounding the wound.  This means healing is  occurring. Severe itching with extensive redness usually indicates sensitivity to the ointment or bandage tape used to dress the wound.  You should call our office if this develops.      Swelling  and/or discoloration around your surgical site is common, particularly when performed around the eye.    All wounds normally drain.  The larger the wound the more drainage there will be.  After 7-10 days, you will notice the wound beginning to shrink and new skin will begin to grow.  The wound is healed when you can see skin has formed over the entire area.  A healed wound has a healthy, shiny look to the surface and is red to dark pink in color to normalize.  Wounds may take approximately 4-6 weeks to heal.  Larger wounds may take 6-8 weeks.  After the wound is healed you may discontinue dressing changes.    You may experience a sensation of tightness as your wound heals. This is normal and will gradually subside.    Your healed wound may be sensitive to temperature changes. This sensitivity improves with time, but if you re having a lot of discomfort, try to avoid temperature extremes.    Patients frequently experience itching after their wound appears to have healed because of the continue healing under the skin.  Plain Vaseline will help relieve the itching.        POSSIBLE COMPLICATIONS    BLEEDIN. Leave the bandage in place.  2. Use tightly rolled up gauze or a cloth to apply direct pressure over the bandage for 30  minutes.  3. Reapply pressure for an additional 30 minutes if necessary  4. Use additional gauze and tape to maintain pressure once the bleeding has stopped.

## 2017-07-10 NOTE — MR AVS SNAPSHOT
After Visit Summary   7/10/2017    Jen Benedict    MRN: [id number]           Patient Information     Date Of Birth          1960        Visit Information        Provider Department      7/10/2017 10:00 AM Vasyl Munoz MD [facility]        Care Instructions    Proper skin care from Dr. Munoz- [facility]     Eliminate harsh soaps, i.e. Dial, Zest, [place] Spring;   Use mild soaps such as Cetaphil or Dove Sensitive Skin   Avoid hot or cold showers   After showering, lightly dry off.    Aggressive use of a moisturizer (including Vanicream, Cetaphil, Aquafor or Cerave)   We recommend using a tub that needs to be scooped out, not a pump. This has more of an oil base. It will hold moisture in your skin much better than a water base moisturizer. The ones recommended are non- pore clogging.       If you have any questions call [phone] and follow the prompts to Dr. Munoz's office.             Wound Care Instructions     FOR SUPERFICIAL WOUNDS     [facility] [phone]    [facility] [phone]                       AFTER 24 HOURS YOU SHOULD REMOVE THE BANDAGE AND BEGIN DAILY DRESSING CHANGES AS FOLLOWS:     1) Remove Dressing.     2) Clean and dry the area with tap water using a Q-tip or sterile gauze pad.     3) Apply Vaseline, Aquaphor, Polysporin ointment or Bacitracin ointment over entire wound.  Do NOT use Neosporin ointment.     4) Cover the wound with a band-aid, or a sterile non-stick gauze pad and micropore paper tape      REPEAT THESE INSTRUCTIONS AT LEAST ONCE A DAY UNTIL THE WOUND HAS COMPLETELY HEALED.    It is an old wives tale that a wound heals better when it is exposed to air and allowed to dry out. The wound will heal faster with a better cosmetic result if it is kept moist with ointment and covered with a bandage.    **Do not let the wound dry out.**      Supplies Needed:      *Cotton tipped applicators  (Q-tips)    *Polysporin Ointment or Bacitracin Ointment (NOT NEOSPORIN)    *Band-aids or non-stick gauze pads and micropore paper tape.      PATIENT INFORMATION:    During the healing process you will notice a number of changes. All wounds develop a small halo of redness surrounding the wound.  This means healing is occurring. Severe itching with extensive redness usually indicates sensitivity to the ointment or bandage tape used to dress the wound.  You should call our office if this develops.      Swelling  and/or discoloration around your surgical site is common, particularly when performed around the eye.    All wounds normally drain.  The larger the wound the more drainage there will be.  After 7-10 days, you will notice the wound beginning to shrink and new skin will begin to grow.  The wound is healed when you can see skin has formed over the entire area.  A healed wound has a healthy, shiny look to the surface and is red to dark pink in color to normalize.  Wounds may take approximately 4-6 weeks to heal.  Larger wounds may take 6-8 weeks.  After the wound is healed you may discontinue dressing changes.    You may experience a sensation of tightness as your wound heals. This is normal and will gradually subside.    Your healed wound may be sensitive to temperature changes. This sensitivity improves with time, but if you re having a lot of discomfort, try to avoid temperature extremes.    Patients frequently experience itching after their wound appears to have healed because of the continue healing under the skin.  Plain Vaseline will help relieve the itching.        POSSIBLE COMPLICATIONS    BLEEDIN. Leave the bandage in place.  2. Use tightly rolled up gauze or a cloth to apply direct pressure over the bandage for 30  minutes.  3. Reapply pressure for an additional 30 minutes if necessary  4. Use additional gauze and tape to maintain pressure once the bleeding has stopped.            Follow-ups after  your visit        Who to contact     If you have questions or need follow up information about today's clinic visit or your schedule please contact [facility] directly at [phone].  Normal or non-critical lab and imaging results will be communicated to you by MyChart, letter or phone within 4 business days after the clinic has received the results. If you do not hear from us within 7 days, please contact the clinic through MyChart or phone. If you have a critical or abnormal lab result, we will notify you by phone as soon as possible.  Submit refill requests through J[employer] or call your pharmacy and they will forward the refill request to us. Please allow 3 business days for your refill to be completed.          Additional Information About Your Visit        MyChart Information     [employer] gives you secure access to your electronic health record. If you see a primary care provider, you can also send messages to your care team and make appointments. If you have questions, please call your primary care clinic.  If you do not have a primary care provider, please call [phone] and they will assist you.        Care EveryWhere ID     This is your Care EveryWhere ID. This could be used by other organizations to access your [place] medical records  [id number]        Your Vitals Were     Pulse Pulse Oximetry                68 96%           Blood Pressure from Last 3 Encounters:   07/10/17 143/90   06/02/17 130/80   05/05/17 132/80    Weight from Last 3 Encounters:   06/02/17 59.9 kg (132 lb)   05/05/17 65.3 kg (144 lb)   05/01/17 65.4 kg (144 lb 3.2 oz)              Today, you had the following     No orders found for display       Primary Care Provider Office Phone # Fax #    Elin Arboleda [phone]       [facility] [address]  [facility] [address]        Equal Access to Services     KEYA GILBERT : zelalem Melara qaybta  jannet munozgary diaz ah. So [facility] [phone].    ATENCIÓN: Si selena menendez, tiene a calhoun disposición servicios gratuitos de asistencia lingüística. Rj al [phone].    We comply with applicable federal civil rights laws and [place] laws. We do not discriminate on the basis of race, color, national origin, age, disability sex, sexual orientation or gender identity.            Thank you!     Thank you for choosing [facility]  for your care. Our goal is always to provide you with excellent care. Hearing back from our patients is one way we can continue to improve our services. Please take a few minutes to complete the written survey that you may receive in the mail after your visit with us. Thank you!             Your Updated Medication List - Protect others around you: Learn how to safely use, store and throw away your medicines at www.disposemymeds.org.          This list is accurate as of: 7/10/17 10:27 AM.  Always use your most recent med list.                   Brand Name Dispense Instructions for use Diagnosis    ACE/ARB NOT PRESCRIBED (INTENTIONAL)      Please choose reason not prescribed, below    Type 2 diabetes mellitus without complication, without long-term current use of insulin (H)       atorvastatin 10 MG tablet    LIPITOR    90 tablet    Take 1 tablet (10 mg) by mouth daily    Type 2 diabetes mellitus without complication, without long-term current use of insulin (H)       blood glucose lancets standard    no brand specified    1 Box    Use to test blood sugar one time daily or as directed.    Type 2 diabetes mellitus without complication, without long-term current use of insulin (H)       blood glucose monitoring test strip    no brand specified    100 strip    Use to test blood sugars daily or as directed    Type 2 diabetes mellitus without complication, without long-term current use of insulin (H)       CALCIUM + D PO      Take 1,200 mg by  mouth daily        omega 3 1000 MG Caps     90 capsule    Take 1 g by mouth 2 times daily    CARDIOVASCULAR SCREENING; LDL GOAL LESS THAN 160

## 2017-07-10 NOTE — NURSING NOTE
"Initial /90  Pulse 68  SpO2 96% Estimated body mass index is 24.94 kg/(m^2) as calculated from the following:    Height as of 11/16/15: 1.549 m (5' 1\").    Weight as of 6/2/17: 59.9 kg (132 lb). .    Ashley Ann LPN    "

## 2017-08-14 ENCOUNTER — RADIANT APPOINTMENT (OUTPATIENT)
Dept: MAMMOGRAPHY | Facility: CLINIC | Age: 57
End: 2017-08-14
Attending: NURSE PRACTITIONER
Payer: COMMERCIAL

## 2017-08-14 DIAGNOSIS — Z12.31 VISIT FOR SCREENING MAMMOGRAM: ICD-10-CM

## 2017-08-14 PROCEDURE — G0202 SCR MAMMO BI INCL CAD: HCPCS | Mod: TC

## 2017-08-18 ENCOUNTER — TRANSFERRED RECORDS (OUTPATIENT)
Dept: HEALTH INFORMATION MANAGEMENT | Facility: CLINIC | Age: 57
End: 2017-08-18

## 2017-08-21 ENCOUNTER — OFFICE VISIT (OUTPATIENT)
Dept: FAMILY MEDICINE | Facility: CLINIC | Age: 57
End: 2017-08-21
Payer: COMMERCIAL

## 2017-08-21 ENCOUNTER — OFFICE VISIT (OUTPATIENT)
Dept: DERMATOLOGY | Facility: CLINIC | Age: 57
End: 2017-08-21

## 2017-08-21 VITALS
DIASTOLIC BLOOD PRESSURE: 80 MMHG | SYSTOLIC BLOOD PRESSURE: 134 MMHG | BODY MASS INDEX: 23.35 KG/M2 | HEART RATE: 67 BPM | WEIGHT: 123.6 LBS | TEMPERATURE: 97.5 F | RESPIRATION RATE: 16 BRPM | OXYGEN SATURATION: 99 %

## 2017-08-21 VITALS — HEART RATE: 72 BPM | DIASTOLIC BLOOD PRESSURE: 72 MMHG | SYSTOLIC BLOOD PRESSURE: 136 MMHG | OXYGEN SATURATION: 98 %

## 2017-08-21 DIAGNOSIS — L82.1 DERMATOSIS PAPULOSA NIGRA: Primary | ICD-10-CM

## 2017-08-21 DIAGNOSIS — E11.9 TYPE 2 DIABETES MELLITUS WITHOUT COMPLICATION, WITHOUT LONG-TERM CURRENT USE OF INSULIN (H): Primary | ICD-10-CM

## 2017-08-21 LAB
ANION GAP SERPL CALCULATED.3IONS-SCNC: 2 MMOL/L (ref 3–14)
BUN SERPL-MCNC: 19 MG/DL (ref 7–30)
CALCIUM SERPL-MCNC: 9.3 MG/DL (ref 8.5–10.1)
CHLORIDE SERPL-SCNC: 105 MMOL/L (ref 94–109)
CHOLEST SERPL-MCNC: 157 MG/DL
CO2 SERPL-SCNC: 31 MMOL/L (ref 20–32)
CREAT SERPL-MCNC: 0.63 MG/DL (ref 0.52–1.04)
GFR SERPL CREATININE-BSD FRML MDRD: >90 ML/MIN/1.7M2
GLUCOSE SERPL-MCNC: 106 MG/DL (ref 70–99)
HBA1C MFR BLD: 6.3 % (ref 4.3–6)
HDLC SERPL-MCNC: 59 MG/DL
LDLC SERPL CALC-MCNC: 85 MG/DL
NONHDLC SERPL-MCNC: 98 MG/DL
POTASSIUM SERPL-SCNC: 4 MMOL/L (ref 3.4–5.3)
SODIUM SERPL-SCNC: 138 MMOL/L (ref 133–144)
TRIGL SERPL-MCNC: 66 MG/DL

## 2017-08-21 PROCEDURE — 36415 COLL VENOUS BLD VENIPUNCTURE: CPT | Performed by: NURSE PRACTITIONER

## 2017-08-21 PROCEDURE — 96999 UNLISTED SPEC DERM SVC/PX: CPT | Performed by: DERMATOLOGY

## 2017-08-21 PROCEDURE — 80061 LIPID PANEL: CPT | Performed by: NURSE PRACTITIONER

## 2017-08-21 PROCEDURE — 80048 BASIC METABOLIC PNL TOTAL CA: CPT | Performed by: NURSE PRACTITIONER

## 2017-08-21 PROCEDURE — 83036 HEMOGLOBIN GLYCOSYLATED A1C: CPT | Performed by: NURSE PRACTITIONER

## 2017-08-21 PROCEDURE — 99213 OFFICE O/P EST LOW 20 MIN: CPT | Performed by: NURSE PRACTITIONER

## 2017-08-21 NOTE — PROGRESS NOTES
SUBJECTIVE:   Jen Benedict is a 56 year old female who presents to clinic today for the following health issues:      Diabetes Follow-up    Patient is checking blood sugars: once daily.  Results are as follows:       am - 100's-120's        Diabetic concerns: None     Symptoms of hypoglycemia (low blood sugar): none     Paresthesias (numbness or burning in feet) or sores: No   Date of last diabetic eye exam: 8/18/2017      Amount of exercise or physical activity: 4-5 days/week for an average of 30-45 minutes    Problems taking medications regularly: No    Medication side effects: none  Diet: regular (no restrictions)    Staying active   Tolerating atorvastatin without side effects     Lab Results   Component Value Date    A1C 6.7 05/01/2017     No medications   Diet and exercise   BP Readings from Last 3 Encounters:   08/21/17 134/80   07/10/17 143/90   06/02/17 130/80       Wt Readings from Last 10 Encounters:   08/21/17 123 lb 9.6 oz (56.1 kg)   06/02/17 132 lb (59.9 kg)   05/05/17 144 lb (65.3 kg)   05/01/17 144 lb 3.2 oz (65.4 kg)   11/16/15 136 lb 12.8 oz (62.1 kg)   10/01/15 135 lb 4 oz (61.3 kg)   09/17/15 134 lb (60.8 kg)   08/21/15 133 lb (60.3 kg)   07/29/15 133 lb (60.3 kg)   07/22/15 133 lb (60.3 kg)         Problem list and histories reviewed & adjusted, as indicated.  Additional history: as documented    Labs reviewed in EPIC    Reviewed and updated as needed this visit by clinical staffTobacco  Allergies  Med Hx  Surg Hx  Fam Hx  Soc Hx      Reviewed and updated as needed this visit by Provider         ROS:  Constitutional, HEENT, cardiovascular, pulmonary, gi and gu systems are negative, except as otherwise noted.      OBJECTIVE:                                                    /80 (BP Location: Right arm, Patient Position: Chair, Cuff Size: Adult Regular)  Pulse 67  Temp 97.5  F (36.4  C) (Tympanic)  Resp 16  Wt 123 lb 9.6 oz (56.1 kg)  SpO2 99%  BMI 23.35 kg/m2  Body mass  index is 23.35 kg/(m^2).  GENERAL APPEARANCE: healthy, alert and no distress  RESP: lungs clear to auscultation - no rales, rhonchi or wheezes  CV: regular rates and rhythm, normal S1 S2, no S3 or S4 and no murmur, click or rub  ABDOMEN: soft, nontender, without hepatosplenomegaly or masses and bowel sounds normal  SKIN: no suspicious lesions or rashes  PSYCH: mentation appears normal and affect normal/bright    Diagnostic test results:  Diagnostic Test Results:  Labs pending        ASSESSMENT/PLAN:                                                    1. Type 2 diabetes mellitus without complication, without long-term current use of insulin (H)  Has done really well with diet and exercise   Will at 20 lbs weight loss   Will check labs today     - Hemoglobin A1c  - Lipid Profile  - Basic metabolic panel      Patient Instructions   Way to go on weight loss!  Will get labs today    See me back in 3 months for a recheck         Elin Arboleda NP  [facility]

## 2017-08-21 NOTE — NURSING NOTE
"Initial /72  Pulse 72  SpO2 98% Estimated body mass index is 23.35 kg/(m^2) as calculated from the following:    Height as of 11/16/15: 1.549 m (5' 1\").    Weight as of an earlier encounter on 8/21/17: 56.1 kg (123 lb 9.6 oz). .      "

## 2017-08-21 NOTE — PROGRESS NOTES
Jen Benedict is a 56 year old year old female patient here today for evaluation and managment of more dpn lesions.  Temple healed great. Remainder of the HPI, Meds, PMH, Allergies, FH, and SH was reviewed in chart.    History reviewed. No pertinent past medical history.    Past Surgical History:   Procedure Laterality Date     HYSTERECTOMY TOTAL ABDOMINAL, BILATERAL SALPINGO-OOPHORECTOMY, COMBINED  2002    non-cancer related        Family History   Problem Relation Age of Onset     Hypertension Mother      DIABETES Mother      Rheumatoid Arthritis Mother      DIABETES Sister      Type I       Social History     Social History     Marital status:      Spouse name: N/A     Number of children: N/A     Years of education: N/A     Occupational History     Not on file.     Social History Main Topics     Smoking status: Never Smoker     Smokeless tobacco: Never Used     Alcohol use No     Drug use: No     Sexual activity: No     Other Topics Concern     Not on file     Social History Narrative       Outpatient Encounter Prescriptions as of 8/21/2017   Medication Sig Dispense Refill     ACE/ARB NOT PRESCRIBED, INTENTIONAL, Please choose reason not prescribed, below       blood glucose (NO BRAND SPECIFIED) lancets standard Use to test blood sugar one time daily or as directed. 1 Box 1     atorvastatin (LIPITOR) 10 MG tablet Take 1 tablet (10 mg) by mouth daily 90 tablet 3     blood glucose monitoring (NO BRAND SPECIFIED) test strip Use to test blood sugars daily or as directed 100 strip 3     Calcium Carbonate-Vitamin D (CALCIUM + D PO) Take 1,200 mg by mouth daily        omega 3 1000 MG CAPS Take 1 g by mouth 2 times daily 90 capsule      No facility-administered encounter medications on file as of 8/21/2017.              Review Of Systems  Skin: As above  Eyes: negative  Ears/Nose/Throat: negative  Respiratory: No shortness of breath, dyspnea on exertion, cough, or hemoptysis  Cardiovascular:  negative  Gastrointestinal: negative  Genitourinary: negative  Musculoskeletal: negative  Neurologic: negative  Psychiatric: negative  Hematologic/Lymphatic/Immunologic: negative  Endocrine: negative      O:   NAD, WDWN, Alert & Oriented, Mood & Affect wnl, Vitals stable   Here today alone   /72  Pulse 72  SpO2 98%   General appearance normal   Vitals stable   Alert, oriented and in no acute distress     Stuck on papule son orbits      Eyes: Conjunctivae/lids:Normal     ENT: Lips, buccal mucosa, tongue: normal    MSK:Normal    Cardiovascular: peripheral edema none    Pulm: Breathing Normal    Neuro/Psych: Orientation:Normal; Mood/Affect:Normal      A/P:  1. DPN  L orbit  TANGENTIAL EXCISION:  After consent, anesthesia with LEC and prep, tangential excision performed.  No complications and routine wound care.    20 lesions   UV precautions reviewed with patient.  Skin care regimen reviewed with patient: Eliminate harsh soaps, i.e. Dial, zest, irsih spring; Mild soaps such as Cetaphil or Dove sensitive skin, avoid hot or cold showers, aggressive use of emollients including vanicream, cetaphil or cerave discussed with patient.    Return to clinic 8 weeks     200 paid in full

## 2017-08-21 NOTE — NURSING NOTE
"Chief Complaint   Patient presents with     Diabetes       Initial /80 (BP Location: Right arm, Patient Position: Chair, Cuff Size: Adult Regular)  Pulse 67  Temp 97.5  F (36.4  C) (Tympanic)  Resp 16  Wt 123 lb 9.6 oz (56.1 kg)  SpO2 99%  BMI 23.35 kg/m2 Estimated body mass index is 23.35 kg/(m^2) as calculated from the following:    Height as of 11/16/15: 5' 1\" (1.549 m).    Weight as of this encounter: 123 lb 9.6 oz (56.1 kg).  Medication Reconciliation: complete    Health Maintenance that is potentially due pending provider review:  NONE    n/a    Is there anyone who you would like to be able to receive your results? No  If yes have patient fill out WALTER      "

## 2017-08-21 NOTE — MR AVS SNAPSHOT
After Visit Summary   8/21/2017    Jen Benedict    MRN: [id number]           Patient Information     Date Of Birth          1960        Visit Information        Provider Department      8/21/2017 7:40 AM Elin Arboleda NP [facility]        Today's Diagnoses     Type 2 diabetes mellitus without complication, without long-term current use of insulin (H)    -  1      Care Instructions    Way to go on weight loss!  Will get labs today    See me back in 3 months for a recheck             Follow-ups after your visit        Your next 10 appointments already scheduled     Aug 21, 2017  1:15 PM CDT   Return Visit with Vasyl Munoz MD   [facility]    [address] [facility] [address] [phone]              Who to contact     If you have questions or need follow up information about today's clinic visit or your schedule please contact [facility] directly at [phone].  Normal or non-critical lab and imaging results will be communicated to you by [employer], letter or phone within 4 business days after the clinic has received the results. If you do not hear from us within 7 days, please contact the clinic through SDNsquaret or phone. If you have a critical or abnormal lab result, we will notify you by phone as soon as possible.  Submit refill requests through [employer] or call your pharmacy and they will forward the refill request to us. Please allow 3 business days for your refill to be completed.          Additional Information About Your Visit        [employer] Information     [employer] gives you secure access to your electronic health record. If you see a primary care provider, you can also send messages to your care team and make appointments. If you have questions, please call your primary care clinic.  If you do not have a primary care provider, please call [phone] and they will assist you.         Care EveryWhere ID     This is your Care EveryWhere ID. This could be used by other organizations to access your [place] medical records  [id number]        Your Vitals Were     Pulse Temperature Respirations Pulse Oximetry BMI (Body Mass Index)       67 97.5  F (36.4  C) (Tympanic) 16 99% 23.35 kg/m2        Blood Pressure from Last 3 Encounters:   08/21/17 134/80   07/10/17 143/90   06/02/17 130/80    Weight from Last 3 Encounters:   08/21/17 123 lb 9.6 oz (56.1 kg)   06/02/17 132 lb (59.9 kg)   05/05/17 144 lb (65.3 kg)              We Performed the Following     Basic metabolic panel     Hemoglobin A1c     Lipid Profile        Primary Care Provider Office Phone # Fax #    Elin Arboleda, [phone]       [address] [facility] [address]        Equal Access to Services     PRISCILA [facility] CenterMY[facility] : Hadii aad ku hadasho Soomaali, waaxda luqadaha, qaybta kaalmada adeegyada, waxay jaimein hayla nenan sosa diaz . So [facility] [phone].    ATENCIÓN: Si habla español, tiene a calhoun disposición servicios gratuitos de asistencia lingüística. Llame al [phone].    We comply with applicable federal civil rights laws and [place] laws. We do not discriminate on the basis of race, color, national origin, age, disability sex, sexual orientation or gender identity.            Thank you!     Thank you for choosing [facility]  for your care. Our goal is always to provide you with excellent care. Hearing back from our patients is one way we can continue to improve our services. Please take a few minutes to complete the written survey that you may receive in the mail after your visit with us. Thank you!             Your Updated Medication List - Protect others around you: Learn how to safely use, store and throw away your medicines at www.disposemymeds.org.          This list is accurate as of: 8/21/17  8:01 AM.  Always use your most recent med list.                   Brand Name Dispense  Instructions for use Diagnosis    ACE/ARB NOT PRESCRIBED (INTENTIONAL)      Please choose reason not prescribed, below    Type 2 diabetes mellitus without complication, without long-term current use of insulin (H)       atorvastatin 10 MG tablet    LIPITOR    90 tablet    Take 1 tablet (10 mg) by mouth daily    Type 2 diabetes mellitus without complication, without long-term current use of insulin (H)       blood glucose lancets standard    no brand specified    1 Box    Use to test blood sugar one time daily or as directed.    Type 2 diabetes mellitus without complication, without long-term current use of insulin (H)       blood glucose monitoring test strip    no brand specified    100 strip    Use to test blood sugars daily or as directed    Type 2 diabetes mellitus without complication, without long-term current use of insulin (H)       CALCIUM + D PO      Take 1,200 mg by mouth daily        omega 3 1000 MG Caps     90 capsule    Take 1 g by mouth 2 times daily    CARDIOVASCULAR SCREENING; LDL GOAL LESS THAN 160

## 2017-08-21 NOTE — MR AVS SNAPSHOT
After Visit Summary   8/21/2017    Jen Benedict    MRN: [id number]           Patient Information     Date Of Birth          1960        Visit Information        Provider Department      8/21/2017 1:15 PM Vasyl Munoz MD [facility]        Today's Diagnoses     Dermatosis papulosa nigra    -  1       Follow-ups after your visit        Your next 10 appointments already scheduled     Nov 20, 2017  7:40 AM CST   [facility] with Elin [facility]boleda NP   [facility]    [address] [id number] [phone]              Who to contact     If you have questions or need follow up information about today's clinic visit or your schedule please contact [facility] directly at [phone].  Normal or non-critical lab and imaging results will be communicated to you by MyChart, letter or phone within 4 business days after the clinic has received the results. If you do not hear from us within 7 days, please contact the clinic through MyChart or phone. If you have a critical or abnormal lab result, we will notify you by phone as soon as possible.  Submit refill requests through TMS or call your pharmacy and they will forward the refill request to us. Please allow 3 business days for your refill to be completed.          Additional Information About Your Visit        MyChart Information     TMS gives you secure access to your electronic health record. If you see a primary care provider, you can also send messages to your care team and make appointments. If you have questions, please call your primary care clinic.  If you do not have a primary care provider, please call [phone] and they will assist you.        Care EveryWhere ID     This is your Care EveryWhere ID. This could be used by other organizations to access your [place] medical records  [id number]        Your Vitals Were     Pulse Pulse  Oximetry                72 98%           Blood Pressure from Last 3 Encounters:   08/21/17 136/72   08/21/17 134/80   07/10/17 143/90    Weight from Last 3 Encounters:   08/21/17 56.1 kg (123 lb 9.6 oz)   06/02/17 59.9 kg (132 lb)   05/05/17 65.3 kg (144 lb)              We Performed the Following     C DESTROY > 15 BENIGN SKIN LESIONS        Primary Care Provider Office Phone # Fax #    Elin Arboleda, [phone]       [address] [facility] [address]        Equal Access to Services     [facility] : Hadii joby ku hadasho Soomaali, waaxda luqadaha, qaybta kaalmada adegaryyaalicia, jannet diaz . So [facility] [phone].    ATENCIÓN: Si habla español, tiene a calhoun disposición servicios gratuitos de asistencia lingüística. [facility] [phone].    We comply with applicable federal civil rights laws and [place] laws. We do not discriminate on the basis of race, color, national origin, age, disability sex, sexual orientation or gender identity.            Thank you!     Thank you for choosing [facility]  for your care. Our goal is always to provide you with excellent care. Hearing back from our patients is one way we can continue to improve our services. Please take a few minutes to complete the written survey that you may receive in the mail after your visit with us. Thank you!             Your Updated Medication List - Protect others around you: Learn how to safely use, store and throw away your medicines at www.disposemymeds.org.          This list is accurate as of: 8/21/17  1:37 PM.  Always use your most recent med list.                   Brand Name Dispense Instructions for use Diagnosis    ACE/ARB NOT PRESCRIBED (INTENTIONAL)      Please choose reason not prescribed, below    Type 2 diabetes mellitus without complication, without long-term current use of insulin (H)       atorvastatin 10 MG tablet    LIPITOR    90 tablet    Take 1 tablet (10 mg) by mouth  daily    Type 2 diabetes mellitus without complication, without long-term current use of insulin (H)       blood glucose lancets standard    no brand specified    1 Box    Use to test blood sugar one time daily or as directed.    Type 2 diabetes mellitus without complication, without long-term current use of insulin (H)       blood glucose monitoring test strip    no brand specified    100 strip    Use to test blood sugars daily or as directed    Type 2 diabetes mellitus without complication, without long-term current use of insulin (H)       CALCIUM + D PO      Take 1,200 mg by mouth daily        omega 3 1000 MG Caps     90 capsule    Take 1 g by mouth 2 times daily    CARDIOVASCULAR SCREENING; LDL GOAL LESS THAN 160

## 2017-08-25 ENCOUNTER — MYC MEDICAL ADVICE (OUTPATIENT)
Dept: FAMILY MEDICINE | Facility: CLINIC | Age: 57
End: 2017-08-25

## 2017-09-10 DIAGNOSIS — E11.9 TYPE 2 DIABETES MELLITUS WITHOUT COMPLICATION, WITHOUT LONG-TERM CURRENT USE OF INSULIN (H): ICD-10-CM

## 2017-09-11 RX ORDER — LANCETS
EACH MISCELLANEOUS
Qty: 102 EACH | Refills: 3 | Status: SHIPPED | OUTPATIENT
Start: 2017-09-11 | End: 2018-04-04

## 2017-09-11 NOTE — TELEPHONE ENCOUNTER
blood glucose (NO BRAND SPECIFIED) lancets standard      Last Written Prescription Date: 05/11/2017  Last Fill Quantity: 1 box,  # refills: 1   Last Office Visit with [facility], [facility] or [facility] prescribing provider: 08/21/2017                                         Next 5 appointments (look out 90 days)     Sep 25, 2017  1:15 PM CDT   [facility] Visit with Vasyl Munoz MD   [facility]    [address] [address]   [phone]            Nov 20, 2017  7:40 AM CST   SHORT with Elin Arboleda NP   [facility] ([facility]    [address] [address]   [phone]

## 2017-09-25 ENCOUNTER — OFFICE VISIT (OUTPATIENT)
Dept: DERMATOLOGY | Facility: CLINIC | Age: 57
End: 2017-09-25
Payer: COMMERCIAL

## 2017-09-25 VITALS — SYSTOLIC BLOOD PRESSURE: 141 MMHG | DIASTOLIC BLOOD PRESSURE: 85 MMHG | OXYGEN SATURATION: 99 % | HEART RATE: 68 BPM

## 2017-09-25 DIAGNOSIS — L82.1 DERMATOSIS PAPULOSA NIGRA: Primary | ICD-10-CM

## 2017-09-25 PROCEDURE — 96999 UNLISTED SPEC DERM SVC/PX: CPT | Performed by: DERMATOLOGY

## 2017-09-25 PROCEDURE — 99212 OFFICE O/P EST SF 10 MIN: CPT | Performed by: DERMATOLOGY

## 2017-09-25 RX ORDER — HYDROQUINONE 40 MG/G
CREAM TOPICAL
Qty: 30 G | Refills: 11 | Status: SHIPPED | OUTPATIENT
Start: 2017-09-25 | End: 2018-08-20

## 2017-09-25 RX ORDER — TRETINOIN 0.5 MG/G
CREAM TOPICAL
Qty: 20 G | Refills: 11 | Status: SHIPPED | OUTPATIENT
Start: 2017-09-25 | End: 2019-03-06

## 2017-09-25 NOTE — NURSING NOTE
"Initial /85  Pulse 68  SpO2 99% Estimated body mass index is 23.35 kg/(m^2) as calculated from the following:    Height as of 11/16/15: 1.549 m (5' 1\").    Weight as of 8/21/17: 56.1 kg (123 lb 9.6 oz). .      "

## 2017-09-25 NOTE — MR AVS SNAPSHOT
After Visit Summary   9/25/2017    Jen Benedict    MRN: [id number]           Patient Information     Date Of Birth          1960        Visit Information        Provider Department      9/25/2017 1:15 PM Vasyl Munoz MD [facility]        Today's Diagnoses     Dermatosis papulosa nigra    -  1       Follow-ups after your visit        Your next 10 appointments already scheduled     Nov 20, 2017  7:40 AM CST   SHORT [facility] Elin [facility]eda NP   [facility]    [address] [address] [phone]              Who to contact     If you have questions or need follow up information about today's clinic visit or your schedule please contact [facility] directly at [phone].  Normal or non-critical lab and imaging results will be communicated to you by MyChart, letter or phone within 4 business days after the clinic has received the results. If you do not hear from us within 7 days, please contact the clinic through MyChart or phone. If you have a critical or abnormal lab result, we will notify you by phone as soon as possible.  Submit refill requests through [employer] or call your pharmacy and they will forward the refill request to us. Please allow 3 business days for your refill to be completed.          Additional Information About Your Visit        MyChart Information     [employer] gives you secure access to your electronic health record. If you see a primary care provider, you can also send messages to your care team and make appointments. If you have questions, please call your primary care clinic.  If you do not have a primary care provider, please call [phone] and they will assist you.        Care EveryWhere ID     This is your Care EveryWhere ID. This could be used by other organizations to access your [place] medical records  [id number]        Your Vitals Were     Pulse Pulse  Oximetry                68 99%           Blood Pressure from Last 3 Encounters:   09/25/17 141/85   08/21/17 136/72   08/21/17 134/80    Weight from Last 3 Encounters:   08/21/17 56.1 kg (123 lb 9.6 oz)   06/02/17 59.9 kg (132 lb)   05/05/17 65.3 kg (144 lb)              We Performed the Following     C DESTROY < 15 BENIGN SKIN LESIONS          Today's Medication Changes          These changes are accurate as of: 9/25/17  1:36 PM.  If you have any questions, ask your nurse or doctor.               Start taking these medicines.        Dose/Directions    hydroquinone 4 % Crea   Used for:  Dermatosis papulosa nigra   Started by:  Vasyl Munoz MD        Apply nightly to face   Quantity:  30 g   Refills:  11       tretinoin 0.05 % cream   Commonly known as:  RETIN-A   Used for:  Dermatosis papulosa nigra   Started by:  Vasyl Munoz MD        Spread a pea size amount into affected area topically at bedtime.  Use sunscreen SPF>20.   Quantity:  20 g   Refills:  11            Where to get your medicines      Some of these will need a paper prescription and others can be bought over the counter.  Ask your nurse if you have questions.     Bring a paper prescription for each of these medications     hydroquinone 4 % Crea    tretinoin 0.05 % cream                Primary Care Provider Office Phone # Fax #    Elin JINA Arboleda [phone]       [address] [address]        Equal Access to Services     [facility] AH: Hadii joby buchanano Soadair, waaxda luqadaha, qaybta kaalmada adegaryyada, waxay kelly pina. So [facility] [phone].    ATENCIÓN: Si habla español, tiene a calhoun disposición servicios gratuitos de asistencia lingüística. Llame al [phone].    We comply with applicable federal civil rights laws and [place] laws. We do not discriminate on the basis of race, color, national origin, age, disability sex, sexual orientation or gender  identity.            Thank you!     Thank you for choosing [facility]  for your care. Our goal is always to provide you with excellent care. Hearing back from our patients is one way we can continue to improve our services. Please take a few minutes to complete the written survey that you may receive in the mail after your visit with us. Thank you!             Your Updated Medication List - Protect others around you: Learn how to safely use, store and throw away your medicines at www.disposemymeds.org.          This list is accurate as of: 9/25/17  1:36 PM.  Always use your most recent med list.                   Brand Name Dispense Instructions for use Diagnosis    ACE/ARB NOT PRESCRIBED (INTENTIONAL)      Please choose reason not prescribed, below    Type 2 diabetes mellitus without complication, without long-term current use of insulin (H)       atorvastatin 10 MG tablet    LIPITOR    90 tablet    Take 1 tablet (10 mg) by mouth daily    Type 2 diabetes mellitus without complication, without long-term current use of insulin (H)       blood glucose monitoring lancets     102 each    USE TO TEST BLOOD SUGAR ONE TIME DAILY OR AS DIRECTED    Type 2 diabetes mellitus without complication, without long-term current use of insulin (H)       blood glucose monitoring test strip    no brand specified    100 strip    Use to test blood sugars daily or as directed    Type 2 diabetes mellitus without complication, without long-term current use of insulin (H)       CALCIUM + D PO      Take 1,200 mg by mouth daily        hydroquinone 4 % Crea     30 g    Apply nightly to face    Dermatosis papulosa nigra       omega 3 1000 MG Caps     90 capsule    Take 1 g by mouth 2 times daily    CARDIOVASCULAR SCREENING; LDL GOAL LESS THAN 160       tretinoin 0.05 % cream    RETIN-A    20 g    Spread a pea size amount into affected area topically at bedtime.  Use sunscreen SPF>20.    Dermatosis papulosa nigra

## 2017-09-25 NOTE — PROGRESS NOTES
Jen Benedict is a 56 year old year old female patient here today for evaluation and managment of more dpn lesions.  Previous sites healed well. Remainder of the HPI, Meds, PMH, Allergies, FH, and SH was reviewed in chart.     Past Medical History    History reviewed. No pertinent past medical history.         Past Surgical History          Past Surgical History:   Procedure Laterality Date     HYSTERECTOMY TOTAL ABDOMINAL, BILATERAL SALPINGO-OOPHORECTOMY, COMBINED   2002     non-cancer related             Family History           Family History   Problem Relation Age of Onset     Hypertension Mother       DIABETES Mother       Rheumatoid Arthritis Mother       DIABETES Sister         Type I             Social History    Social History            Social History     Marital status:        Spouse name: N/A     Number of children: N/A     Years of education: N/A          Occupational History     Not on file.           Social History Main Topics     Smoking status: Never Smoker     Smokeless tobacco: Never Used     Alcohol use No     Drug use: No     Sexual activity: No           Other Topics Concern     Not on file      Social History Narrative             Encounter Medications           Outpatient Encounter Prescriptions as of 8/21/2017   Medication Sig Dispense Refill     ACE/ARB NOT PRESCRIBED, INTENTIONAL, Please choose reason not prescribed, below         blood glucose (NO BRAND SPECIFIED) lancets standard Use to test blood sugar one time daily or as directed. 1 Box 1     atorvastatin (LIPITOR) 10 MG tablet Take 1 tablet (10 mg) by mouth daily 90 tablet 3     blood glucose monitoring (NO BRAND SPECIFIED) test strip Use to test blood sugars daily or as directed 100 strip 3     Calcium Carbonate-Vitamin D (CALCIUM + D PO) Take 1,200 mg by mouth daily          omega 3 1000 MG CAPS Take 1 g by mouth 2 times daily 90 capsule        No facility-administered encounter medications on file as of 8/21/2017.                       Review Of Systems  Skin: As above  Eyes: negative  Ears/Nose/Throat: negative  Respiratory: No shortness of breath, dyspnea on exertion, cough, or hemoptysis  Cardiovascular: negative  Gastrointestinal: negative  Genitourinary: negative  Musculoskeletal: negative  Neurologic: negative  Psychiatric: negative  Hematologic/Lymphatic/Immunologic: negative  Endocrine: negative        O:                                   NAD, WDWN, Alert & Oriented, Mood & Affect wnl, Vitals stable                                         Here today alone                                         /72  Pulse 72  SpO2 98%                                         General appearance normal                                         Vitals stable                                         Alert, oriented and in no acute distress      Stuck on papule son orbits                                                       Eyes: Conjunctivae/lids:Normal                                                     ENT: Lips, buccal mucosa, tongue: normal                                                    MSK:Normal                                                    Cardiovascular: peripheral edema none                                                    Pulm: Breathing Normal                                                    Neuro/Psych: Orientation:Normal; Mood/Affect:Normal        A/P:  1. DPN  L cheek   TANGENTIAL EXCISION:  After consent, anesthesia with LEC and prep, tangential excision performed.  No complications and routine wound care.    15 lesions   UV precautions reviewed with patient.  Skin care regimen reviewed with patient: Eliminate harsh soaps, i.e. Dial, zest, irsih spring; Mild soaps such as Cetaphil or Dove sensitive skin, avoid hot or cold showers, aggressive use of emollients including vanicream, cetaphil or cerave discussed with patient.    Return to clinic 8 weeks   Start tretinoin and hydroquinone for pih

## 2017-09-28 ENCOUNTER — MYC MEDICAL ADVICE (OUTPATIENT)
Dept: FAMILY MEDICINE | Facility: CLINIC | Age: 57
End: 2017-09-28

## 2017-10-03 ENCOUNTER — MYC MEDICAL ADVICE (OUTPATIENT)
Dept: FAMILY MEDICINE | Facility: CLINIC | Age: 57
End: 2017-10-03

## 2017-10-23 ENCOUNTER — OFFICE VISIT (OUTPATIENT)
Dept: DERMATOLOGY | Facility: CLINIC | Age: 57
End: 2017-10-23
Payer: COMMERCIAL

## 2017-10-23 VITALS — OXYGEN SATURATION: 99 % | HEART RATE: 77 BPM | SYSTOLIC BLOOD PRESSURE: 145 MMHG | DIASTOLIC BLOOD PRESSURE: 75 MMHG

## 2017-10-23 DIAGNOSIS — L82.1 DERMATOSIS PAPULOSA NIGRA: Primary | ICD-10-CM

## 2017-10-23 PROCEDURE — 99212 OFFICE O/P EST SF 10 MIN: CPT | Performed by: DERMATOLOGY

## 2017-10-23 PROCEDURE — 96999 UNLISTED SPEC DERM SVC/PX: CPT | Performed by: DERMATOLOGY

## 2017-10-23 NOTE — MR AVS SNAPSHOT
After Visit Summary   10/23/2017    Jen Benedict    MRN: [id number]           Patient Information     Date Of Birth          1960        Visit Information        Provider Department      10/23/2017 10:30 AM Vasyl Munoz MD [facility]        Today's Diagnoses     Dermatosis papulosa nigra    -  1       Follow-ups after your visit        Your next 10 appointments already scheduled     Nov 20, 2017  7:40 AM CST   SHORT with Elin Arboleda NP   [facility]    [address] [address] [phone]              Who to contact     If you have questions or need follow up information about today's clinic visit or your schedule please contact [facility] directly at [phone].  Normal or non-critical lab and imaging results will be communicated to you by MyChart, letter or phone within 4 business days after the clinic has received the results. If you do not hear from us within 7 days, please contact the clinic through MyChart or phone. If you have a critical or abnormal lab result, we will notify you by phone as soon as possible.  Submit refill requests through [employer] or call your pharmacy and they will forward the refill request to us. Please allow 3 business days for your refill to be completed.          Additional Information About Your Visit        MyChart Information     [employer] gives you secure access to your electronic health record. If you see a primary care provider, you can also send messages to your care team and make appointments. If you have questions, please call your primary care clinic.  If you do not have a primary care provider, please call [phone] and they will assist you.        Care EveryWhere ID     This is your Care EveryWhere ID. This could be used by other organizations to access your [place] medical records  [id number]        Your Vitals Were     Pulse Pulse  Oximetry                77 99%           Blood Pressure from Last 3 Encounters:   10/23/17 145/75   09/25/17 141/85   08/21/17 136/72    Weight from Last 3 Encounters:   08/21/17 56.1 kg (123 lb 9.6 oz)   06/02/17 59.9 kg (132 lb)   05/05/17 65.3 kg (144 lb)              We Performed the Following     C DESTROY < 15 BENIGN SKIN LESIONS        Primary Care Provider Office Phone # Fax #    Elin Arboleda, [phone]       [address] [facility] [address]        Equal Access to Services     [facility] : Hadii joby ayala hadasho Soadair, waaxda luqadaha, qaybta kaalmada sosayaalicia, jannet diaz . So [facility] [place] [facility] [phone].    ATENCIÓN: Si habla español, tiene a calhoun disposición servicios gratuitos de asistencia lingüística. [facility] [phone].    We comply with applicable federal civil rights laws and [place] laws. We do not discriminate on the basis of race, color, national origin, age, disability, sex, sexual orientation, or gender identity.            Thank you!     Thank you for choosing [facility]  for your care. Our goal is always to provide you with excellent care. Hearing back from our patients is one way we can continue to improve our services. Please take a few minutes to complete the written survey that you may receive in the mail after your visit with us. Thank you!             Your Updated Medication List - Protect others around you: Learn how to safely use, store and throw away your medicines at www.disposemymeds.org.          This list is accurate as of: 10/23/17 11:05 AM.  Always use your most recent med list.                   Brand Name Dispense Instructions for use Diagnosis    ACE/ARB/ARNI NOT PRESCRIBED (INTENTIONAL)      Please choose reason not prescribed, below    Type 2 diabetes mellitus without complication, without long-term current use of insulin (H)       atorvastatin 10 MG tablet    LIPITOR    90 tablet    Take 1 tablet (10 mg)  by mouth daily    Type 2 diabetes mellitus without complication, without long-term current use of insulin (H)       blood glucose monitoring lancets     102 each    USE TO TEST BLOOD SUGAR ONE TIME DAILY OR AS DIRECTED    Type 2 diabetes mellitus without complication, without long-term current use of insulin (H)       blood glucose monitoring test strip    no brand specified    100 strip    Use to test blood sugars daily or as directed    Type 2 diabetes mellitus without complication, without long-term current use of insulin (H)       CALCIUM + D PO      Take 1,200 mg by mouth daily        hydroquinone 4 % Crea     30 g    Apply nightly to face    Dermatosis papulosa nigra       omega 3 1000 MG Caps     90 capsule    Take 1 g by mouth 2 times daily    CARDIOVASCULAR SCREENING; LDL GOAL LESS THAN 160       tretinoin 0.05 % cream    RETIN-A    20 g    Spread a pea size amount into affected area topically at bedtime.  Use sunscreen SPF>20.    Dermatosis papulosa nigra

## 2017-10-23 NOTE — NURSING NOTE
"Initial /75  Pulse 77  SpO2 99% Estimated body mass index is 23.35 kg/(m^2) as calculated from the following:    Height as of 11/16/15: 1.549 m (5' 1\").    Weight as of 8/21/17: 56.1 kg (123 lb 9.6 oz). .      "

## 2017-10-23 NOTE — LETTER
10/23/2017         RE: Jen Benedict  [address] [facility] [address]        Dear Colleague,    Thank you for referring your patient, Jen Benedict, to the [facility]. Please see a copy of my visit note below.    Jen Benedict is a 56 year old year old female patient here today for evaluation and managment of more dpn lesions. uSING TRETINOIN AND HYDROQUINONE.   Previous sites healed well. Remainder of the HPI, Meds, PMH, Allergies, FH, and SH was reviewed in chart.      Past Medical History    History reviewed. No pertinent past medical history.            Past Surgical History              Past Surgical History:   Procedure Laterality Date     HYSTERECTOMY TOTAL ABDOMINAL, BILATERAL SALPINGO-OOPHORECTOMY, COMBINED    2002      non-cancer related                Family History                Family History   Problem Relation Age of Onset     Hypertension Mother        DIABETES Mother        Rheumatoid Arthritis Mother        DIABETES Sister            Type I                 Social History    Social History                 Social History     Marital status:          Spouse name: N/A     Number of children: N/A     Years of education: N/A             Occupational History     Not on file.               Social History Main Topics     Smoking status: Never Smoker     Smokeless tobacco: Never Used     Alcohol use No     Drug use: No     Sexual activity: No               Other Topics Concern     Not on file       Social History Narrative                Encounter Medications                Outpatient Encounter Prescriptions as of 8/21/2017   Medication Sig Dispense Refill     ACE/ARB NOT PRESCRIBED, INTENTIONAL, Please choose reason not prescribed, below           blood glucose (NO BRAND SPECIFIED) lancets standard Use to test blood sugar one time daily or as directed. 1 Box 1     atorvastatin (LIPITOR) 10 MG tablet Take 1 tablet (10 mg) by mouth daily 90 tablet 3     blood  glucose monitoring (NO BRAND SPECIFIED) test strip Use to test blood sugars daily or as directed 100 strip 3     Calcium Carbonate-Vitamin D (CALCIUM + D PO) Take 1,200 mg by mouth daily            omega 3 1000 MG CAPS Take 1 g by mouth 2 times daily 90 capsule          No facility-administered encounter medications on file as of 8/21/2017.                           Review Of Systems  Skin: As above  Eyes: negative  Ears/Nose/Throat: negative  Respiratory: No shortness of breath, dyspnea on exertion, cough, or hemoptysis  Cardiovascular: negative  Gastrointestinal: negative  Genitourinary: negative  Musculoskeletal: negative  Neurologic: negative  Psychiatric: negative  Hematologic/Lymphatic/Immunologic: negative  Endocrine: negative          O:                                   NAD, WDWN, Alert & Oriented, Mood & Affect wnl, Vitals stable                                         Here today alone                                         /72  Pulse 72  SpO2 98%                                         General appearance normal                                         Vitals stable                                         Alert, oriented and in no acute distress      Stuck on papule son orbits                                                        Eyes: Conjunctivae/lids:Normal                                                     ENT: Lips, buccal mucosa, tongue: normal                                                    MSK:Normal                                                    Cardiovascular: peripheral edema none                                                    Pulm: Breathing Normal                                                    Neuro/Psych: Orientation:Normal; Mood/Affect:Normal          A/P:  1. DPN  R cheek   TANGENTIAL EXCISION:  After consent, anesthesia with LEC and prep, tangential excision performed.  No complications and routine wound care.    15 lesions   UV precautions reviewed with  patient.  Skin care regimen reviewed with patient: Eliminate harsh soaps, i.e. Dial, zest, irsih spring; Mild soaps such as Cetaphil or Dove sensitive skin, avoid hot or cold showers, aggressive use of emollients including vanicream, cetaphil or cerave discussed with patient.    Return to clinic 8 weeks   Start tretinoin and hydroquinone for pih      Again, thank you for allowing me to participate in the care of your patient.        Sincerely,        Vasyl Munoz MD

## 2017-10-23 NOTE — PROGRESS NOTES
Jen Benedict is a 56 year old year old female patient here today for evaluation and managment of more dpn lesions. uSING TRETINOIN AND HYDROQUINONE.   Previous sites healed well. Remainder of the HPI, Meds, PMH, Allergies, FH, and SH was reviewed in chart.      Past Medical History    History reviewed. No pertinent past medical history.            Past Surgical History              Past Surgical History:   Procedure Laterality Date     HYSTERECTOMY TOTAL ABDOMINAL, BILATERAL SALPINGO-OOPHORECTOMY, COMBINED    2002      non-cancer related                Family History                Family History   Problem Relation Age of Onset     Hypertension Mother        DIABETES Mother        Rheumatoid Arthritis Mother        DIABETES Sister            Type I                 Social History    Social History                 Social History     Marital status:          Spouse name: N/A     Number of children: N/A     Years of education: N/A             Occupational History     Not on file.               Social History Main Topics     Smoking status: Never Smoker     Smokeless tobacco: Never Used     Alcohol use No     Drug use: No     Sexual activity: No               Other Topics Concern     Not on file       Social History Narrative                Encounter Medications                Outpatient Encounter Prescriptions as of 8/21/2017   Medication Sig Dispense Refill     ACE/ARB NOT PRESCRIBED, INTENTIONAL, Please choose reason not prescribed, below           blood glucose (NO BRAND SPECIFIED) lancets standard Use to test blood sugar one time daily or as directed. 1 Box 1     atorvastatin (LIPITOR) 10 MG tablet Take 1 tablet (10 mg) by mouth daily 90 tablet 3     blood glucose monitoring (NO BRAND SPECIFIED) test strip Use to test blood sugars daily or as directed 100 strip 3     Calcium Carbonate-Vitamin D (CALCIUM + D PO) Take 1,200 mg by mouth daily            omega 3 1000 MG CAPS Take 1 g by mouth 2 times daily  90 capsule          No facility-administered encounter medications on file as of 8/21/2017.                           Review Of Systems  Skin: As above  Eyes: negative  Ears/Nose/Throat: negative  Respiratory: No shortness of breath, dyspnea on exertion, cough, or hemoptysis  Cardiovascular: negative  Gastrointestinal: negative  Genitourinary: negative  Musculoskeletal: negative  Neurologic: negative  Psychiatric: negative  Hematologic/Lymphatic/Immunologic: negative  Endocrine: negative          O:                                   NAD, WDWN, Alert & Oriented, Mood & Affect wnl, Vitals stable                                         Here today alone                                         /72  Pulse 72  SpO2 98%                                         General appearance normal                                         Vitals stable                                         Alert, oriented and in no acute distress      Stuck on papule son orbits                                                        Eyes: Conjunctivae/lids:Normal                                                     ENT: Lips, buccal mucosa, tongue: normal                                                    MSK:Normal                                                    Cardiovascular: peripheral edema none                                                    Pulm: Breathing Normal                                                    Neuro/Psych: Orientation:Normal; Mood/Affect:Normal          A/P:  1. DPN  R cheek   TANGENTIAL EXCISION:  After consent, anesthesia with LEC and prep, tangential excision performed.  No complications and routine wound care.    15 lesions   UV precautions reviewed with patient.  Skin care regimen reviewed with patient: Eliminate harsh soaps, i.e. Dial, zest, irsih spring; Mild soaps such as Cetaphil or Dove sensitive skin, avoid hot or cold showers, aggressive use of emollients including vanicream, cetaphil or cerave  discussed with patient.    Return to clinic 8 weeks   Start tretinoin and hydroquinone for pih

## 2017-11-27 ENCOUNTER — OFFICE VISIT (OUTPATIENT)
Dept: FAMILY MEDICINE | Facility: CLINIC | Age: 57
End: 2017-11-27
Payer: COMMERCIAL

## 2017-11-27 VITALS
OXYGEN SATURATION: 98 % | HEIGHT: 61 IN | SYSTOLIC BLOOD PRESSURE: 120 MMHG | BODY MASS INDEX: 24.35 KG/M2 | TEMPERATURE: 97 F | WEIGHT: 129 LBS | DIASTOLIC BLOOD PRESSURE: 76 MMHG | HEART RATE: 78 BPM

## 2017-11-27 DIAGNOSIS — Z23 NEED FOR PROPHYLACTIC VACCINATION AND INOCULATION AGAINST INFLUENZA: ICD-10-CM

## 2017-11-27 DIAGNOSIS — E11.9 TYPE 2 DIABETES MELLITUS WITHOUT COMPLICATION, WITHOUT LONG-TERM CURRENT USE OF INSULIN (H): Primary | ICD-10-CM

## 2017-11-27 LAB
ANION GAP SERPL CALCULATED.3IONS-SCNC: 8 MMOL/L (ref 3–14)
BUN SERPL-MCNC: 15 MG/DL (ref 7–30)
CALCIUM SERPL-MCNC: 9.3 MG/DL (ref 8.5–10.1)
CHLORIDE SERPL-SCNC: 103 MMOL/L (ref 94–109)
CHOLEST SERPL-MCNC: 258 MG/DL
CO2 SERPL-SCNC: 27 MMOL/L (ref 20–32)
CREAT SERPL-MCNC: 0.58 MG/DL (ref 0.52–1.04)
GFR SERPL CREATININE-BSD FRML MDRD: >90 ML/MIN/1.7M2
GLUCOSE SERPL-MCNC: 98 MG/DL (ref 70–99)
HBA1C MFR BLD: 6.3 % (ref 4.3–6)
HDLC SERPL-MCNC: 79 MG/DL
LDLC SERPL CALC-MCNC: 158 MG/DL
NONHDLC SERPL-MCNC: 179 MG/DL
POTASSIUM SERPL-SCNC: 4.3 MMOL/L (ref 3.4–5.3)
SODIUM SERPL-SCNC: 138 MMOL/L (ref 133–144)
TRIGL SERPL-MCNC: 103 MG/DL
TSH SERPL DL<=0.005 MIU/L-ACNC: 0.52 MU/L (ref 0.4–4)

## 2017-11-27 PROCEDURE — 90686 IIV4 VACC NO PRSV 0.5 ML IM: CPT | Performed by: NURSE PRACTITIONER

## 2017-11-27 PROCEDURE — 80048 BASIC METABOLIC PNL TOTAL CA: CPT | Performed by: NURSE PRACTITIONER

## 2017-11-27 PROCEDURE — 36415 COLL VENOUS BLD VENIPUNCTURE: CPT | Performed by: NURSE PRACTITIONER

## 2017-11-27 PROCEDURE — 99213 OFFICE O/P EST LOW 20 MIN: CPT | Mod: 25 | Performed by: NURSE PRACTITIONER

## 2017-11-27 PROCEDURE — 90471 IMMUNIZATION ADMIN: CPT | Performed by: NURSE PRACTITIONER

## 2017-11-27 PROCEDURE — 83036 HEMOGLOBIN GLYCOSYLATED A1C: CPT | Performed by: NURSE PRACTITIONER

## 2017-11-27 PROCEDURE — 84443 ASSAY THYROID STIM HORMONE: CPT | Performed by: NURSE PRACTITIONER

## 2017-11-27 PROCEDURE — 80061 LIPID PANEL: CPT | Performed by: NURSE PRACTITIONER

## 2017-11-27 RX ORDER — MULTIPLE VITAMINS W/ MINERALS TAB 9MG-400MCG
1 TAB ORAL DAILY
COMMUNITY

## 2017-11-27 NOTE — NURSING NOTE
"Chief Complaint   Patient presents with     Diabetes     follow up        Initial LMP 02/24/2017 (Exact Date) Estimated body mass index is 23.49 kg/(m^2) as calculated from the following:    Height as of 4/4/17: 5' 7\" (1.702 m).    Weight as of 4/4/17: 150 lb (68 kg).  Medication Reconciliation: complete     Khadra Duarte, NICOLASA   "

## 2017-11-27 NOTE — PATIENT INSTRUCTIONS
LABS TODAY   Flu shot given     Follow up with employer regarding mantoux     Will send [employer] message with results

## 2017-11-27 NOTE — MR AVS SNAPSHOT
After Visit Summary   11/27/2017    Jen Benedict    MRN: [id number]           Patient Information     Date Of Birth          1960        Visit Information        Provider Department      11/27/2017 7:40 AM Elin Arboleda NP [facility]        Today's Diagnoses     Type 2 diabetes mellitus without complication, without long-term current use of insulin (H)    -  1      Care Instructions    LABS TODAY   Flu shot given     Follow up with employer regarding mantoux     Will send [employer] message with results           Follow-ups after your visit        Who to contact     If you have questions or need follow up information about today's clinic visit or your schedule please contact [facility] directly at [phone].  Normal or non-critical lab and imaging results will be communicated to you by MyChart, letter or phone within 4 business days after the clinic has received the results. If you do not hear from us within 7 days, please contact the clinic through [employer]ly[employer] or phone. If you have a critical or abnormal lab result, we will notify you by phone as soon as possible.  Submit refill requests through [employer] or call your pharmacy and they will forward the refill request to us. Please allow 3 business days for your refill to be completed.          Additional Information About Your Visit        MyChart Information     [employer] gives you secure access to your electronic health record. If you see a primary care provider, you can also send messages to your care team and make appointments. If you have questions, please call your primary care clinic.  If you do not have a primary care provider, please call [phone] and they will assist you.        Care EveryWhere ID     This is your Care EveryWhere ID. This could be used by other organizations to access your [facility] medical records  [id number]        Your Vitals Were     Pulse Temperature Height Pulse Oximetry BMI  "(Body Mass Index)       78 97  F (36.1  C) (Tympanic) 5' 1\" (1.549 m) 98% 24.37 kg/m2        Blood Pressure from Last 3 Encounters:   11/27/17 120/76   10/23/17 145/75   09/25/17 141/85    Weight from Last 3 Encounters:   11/27/17 129 lb (58.5 kg)   08/21/17 123 lb 9.6 oz (56.1 kg)   06/02/17 132 lb (59.9 kg)              We Performed the Following     Basic metabolic panel     Hemoglobin A1c     Lipid Profile     TSH with free T4 reflex          Today's Medication Changes          These changes are accurate as of: 11/27/17  8:11 AM.  If you have any questions, ask your nurse or doctor.               Stop taking these medicines if you haven't already. Please contact your care team if you have questions.     atorvastatin 10 MG tablet   Commonly known as:  LIPITOR   Stopped by:  Elin Arboleda NP                    Primary Care Provider Office Phone # Fax #    Elin Arboleda [phone]       [address] [facility] [address]        Equal Access to Services     [facility]: Hadii joby ku hadasho Soomaali, waaxda luqadaha, qaybta kaalmada adebarbra, jannet diaz . So [facility] [place] [facility] [phone].    ATENCIÓN: Si habla español, tiene a calhoun disposición servicios gratuitos de asistencia lingüística. Rj al [phone].    We comply with applicable federal civil rights laws and [place] laws. We do not discriminate on the basis of race, color, national origin, age, disability, sex, sexual orientation, or gender identity.            Thank you!     Thank you for choosing [facility]  for your care. Our goal is always to provide you with excellent care. Hearing back from our patients is one way we can continue to improve our services. Please take a few minutes to complete the written survey that you may receive in the mail after your visit with us. Thank you!             Your Updated Medication List - Protect others around you: Learn how to safely use, store and " throw away your medicines at www.disposemymeds.org.          This list is accurate as of: 11/27/17  8:11 AM.  Always use your most recent med list.                   Brand Name Dispense Instructions for use Diagnosis    ACE/ARB/ARNI NOT PRESCRIBED (INTENTIONAL)      Please choose reason not prescribed, below    Type 2 diabetes mellitus without complication, without long-term current use of insulin (H)       blood glucose monitoring lancets     102 each    USE TO TEST BLOOD SUGAR ONE TIME DAILY OR AS DIRECTED    Type 2 diabetes mellitus without complication, without long-term current use of insulin (H)       blood glucose monitoring test strip    no brand specified    100 strip    Use to test blood sugars daily or as directed    Type 2 diabetes mellitus without complication, without long-term current use of insulin (H)       CALCIUM + D PO      Take 1,200 mg by mouth daily        hydroquinone 4 % Crea     30 g    Apply nightly to face    Dermatosis papulosa nigra       Multi-vitamin Tabs tablet      Take 1 tablet by mouth daily        omega 3 1000 MG Caps     90 capsule    Take 1 g by mouth 2 times daily    CARDIOVASCULAR SCREENING; LDL GOAL LESS THAN 160       tretinoin 0.05 % cream    RETIN-A    20 g    Spread a pea size amount into affected area topically at bedtime.  Use sunscreen SPF>20.    Dermatosis papulosa nigra

## 2017-11-27 NOTE — PROGRESS NOTES
SUBJECTIVE:   Jen Benedict is a 57 year old female who presents to clinic today for the following health issues:    Diabetes Follow-up    Patient is checking blood sugars: once daily.  Results are as follows:       am - 120    Diabetic concerns: None     Symptoms of hypoglycemia (low blood sugar): none     Paresthesias (numbness or burning in feet) or sores: Yes numbness in toes         Date of last diabetic eye exam: 3 months     Amount of exercise or physical activity: 2-3 days/week for an average of 30-45 minutes    Problems taking medications regularly: No    Medication side effects: none    Diet: regular (no restrictions)      Had a mantoux on Friday   10 mm induration   Had a interferon gold   Wants to wait on chest xray   Reports always has a reaction   ha      Wt Readings from Last 4 Encounters:   11/27/17 129 lb (58.5 kg)   08/21/17 123 lb 9.6 oz (56.1 kg)   06/02/17 132 lb (59.9 kg)   05/05/17 144 lb (65.3 kg)       Problem list and histories reviewed & adjusted, as indicated.  Additional history: as documented    Patient Active Problem List   Diagnosis     Advanced directives, counseling/discussion     CARDIOVASCULAR SCREENING; LDL GOAL LESS THAN 160     Family history of rheumatoid arthritis     Accessory skin tags     Osteopenia     Type 2 diabetes mellitus without complication, without long-term current use of insulin (H)     Past Surgical History:   Procedure Laterality Date     HYSTERECTOMY TOTAL ABDOMINAL, BILATERAL SALPINGO-OOPHORECTOMY, COMBINED  2002    non-cancer related       Social History   Substance Use Topics     Smoking status: Never Smoker     Smokeless tobacco: Never Used     Alcohol use No     Family History   Problem Relation Age of Onset     Hypertension Mother      DIABETES Mother      Rheumatoid Arthritis Mother      DIABETES Sister      Type I             Reviewed and updated as needed this visit by clinical staff     Reviewed and updated as needed this visit by Provider      "    ROS:  Constitutional, HEENT, cardiovascular, pulmonary, gi and gu systems are negative, except as otherwise noted.      OBJECTIVE:                                                    /76  Pulse 78  Temp 97  F (36.1  C) (Tympanic)  Ht 5' 1\" (1.549 m)  Wt 129 lb (58.5 kg)  SpO2 98%  BMI 24.37 kg/m2  Body mass index is 24.37 kg/(m^2).  GENERAL APPEARANCE: healthy, alert and no distress  HENT: ear canals and TM's normal and nose and mouth without ulcers or lesions  RESP: lungs clear to auscultation - no rales, rhonchi or wheezes  CV: regular rates and rhythm, normal S1 S2, no S3 or S4 and no murmur, click or rub  ABDOMEN: soft, nontender, without hepatosplenomegaly or masses and bowel sounds normal  SKIN: 10 mm area of erythema and induration on right forearm secondary to mantoux     Diagnostic test results:  Diagnostic Test Results:  Pending      ASSESSMENT/PLAN:                                                    1. Type 2 diabetes mellitus without complication, without long-term current use of insulin (H)  Stable  Will get labs today   Weight up a bit   Discussed this today   - Basic metabolic panel  - Hemoglobin A1c  - Lipid Profile  - TSH with free T4 reflex    2. Need for prophylactic vaccination and inoculation against influenza  - FLU VAC, SPLIT VIRUS IM > 3 YO (QUADRIVALENT) [37196]  - Vaccine Administration, Initial [23561]  - ADMIN INFLUENZA (For MEDICARE Patients ONLY) []  Patient Instructions   LABS TODAY   Flu shot given     Follow up with employer regarding mantoux     Will send [employer] message with results       Elin Arboleda NP  [facility]    "

## 2017-11-27 NOTE — PROGRESS NOTES

## 2017-11-28 DIAGNOSIS — E78.2 MIXED HYPERLIPIDEMIA: Primary | ICD-10-CM

## 2017-11-28 RX ORDER — ATORVASTATIN CALCIUM 10 MG/1
10 TABLET, FILM COATED ORAL DAILY
Qty: 90 TABLET | Refills: 3 | Status: SHIPPED | OUTPATIENT
Start: 2017-11-28 | End: 2018-03-06

## 2017-12-22 ENCOUNTER — OFFICE VISIT (OUTPATIENT)
Dept: FAMILY MEDICINE | Facility: CLINIC | Age: 57
End: 2017-12-22
Payer: COMMERCIAL

## 2017-12-22 VITALS
OXYGEN SATURATION: 99 % | RESPIRATION RATE: 14 BRPM | HEART RATE: 78 BPM | SYSTOLIC BLOOD PRESSURE: 144 MMHG | DIASTOLIC BLOOD PRESSURE: 80 MMHG | BODY MASS INDEX: 24.37 KG/M2 | TEMPERATURE: 98 F | WEIGHT: 129 LBS

## 2017-12-22 DIAGNOSIS — I10 ESSENTIAL HYPERTENSION: ICD-10-CM

## 2017-12-22 DIAGNOSIS — M72.2 PLANTAR FASCIITIS: Primary | ICD-10-CM

## 2017-12-22 PROCEDURE — 99214 OFFICE O/P EST MOD 30 MIN: CPT | Performed by: NURSE PRACTITIONER

## 2017-12-22 RX ORDER — NAPROXEN 500 MG/1
500 TABLET ORAL 2 TIMES DAILY WITH MEALS
Qty: 28 TABLET | Refills: 0 | Status: SHIPPED | OUTPATIENT
Start: 2017-12-22 | End: 2018-01-05

## 2017-12-22 NOTE — MR AVS SNAPSHOT
After Visit Summary   12/22/2017    Jen Benedict    MRN: [id number]           Patient Information     Date Of Birth          1960        Visit Information        Provider Department      12/22/2017 11:00 AM Elin Arboleda NP [facility]        Today's Diagnoses     Plantar fasciitis    -  1      Care Instructions      1. Plantar fasciitis  Conservative treatment  Naproxen twice a day for 2 week   Stretches   Ice   Supportive shoes     Follow if not improved in 2-3 weeks - will refer to foot specialist     - naproxen (NAPROSYN) 500 MG tablet; Take 1 tablet (500 mg) by mouth 2 times daily (with meals) for 14 days  Dispense: 28 tablet; Refill: 0    Plantar Fasciitis  Plantar fasciitis is a painful swelling of the plantar fascia. The plantar fascia is a thick, fibrous layer of tissue. It covers the bones on the bottom of your foot. And it supports the foot bones in an arched position.  This can happen gradually or suddenly. It usually affects one foot at a time. Heel pain can be sharp, like a knife sticking into the bottom of your foot. You may feel pain after exercising, long-distance jogging, stair climbing, long periods of standing, or after standing up.  Risk factors include: non-active lifestyle, arthritis, diabetes, obesity or recent weight gain, flat foot, high arch. Wearing high heels, loose shoes, or shoes with poor arch support for long periods of time adds to the risk. This problem is commonly found in runners and dancers. It also found in people who stand on hard surfaces for long periods of time.  Foot pain from this condition is usually worse in the morning. But it improves with walking. By the end of the day there may be a dull aching. Treatment requires short-term rest and controlling swelling. It may take up to 9 months before all symptoms go away. Rarely, a steroid injection into the foot, or surgery, may be needed.  Home care    If you are overweight, lose  weight to help healing.    Choose supportive shoes with good arch support and shock absorbency. Replace athletic shoes when they become worn out. Don t walk or run barefoot.    Premade or custom-fitted shoe inserts may be helpful. Inserts made of silicone seem to be the most effective. Custom-made inserts can be provided by a podiatrist or foot specialist, physical therapist, or orthopedist.    Premade or custom-made night splints keep the heel stretched out while you sleep. They may prevent morning pain.    Avoid activities that stress the feet: jogging, prolonged standing or walking, contact sports, etc.    First thing in the morning and before sports, stretch the bottom of your feet. Gently flex your ankle so the toes move toward your knee.    Icing may help control heel pain. Apply an ice pack to the heel for 10-20 minutes as a preventive. Or ice your heel after a severe flare-up of symptoms. You may repeat this every 1-2 hours as needed.    You may use over-the-counter pain medicine to control pain, unless another medicine was prescribed. Anti-inflammatory pain medicines, such as ibuprofen or naproxen, may work better than acetaminophen. If you have chronic liver or kidney disease or ever had a stomach ulcer or GI bleeding, talk with your healthcare provider before using these medicines.  Follow-up care  Follow up with your healthcare provider, physical therapist, or podiatrist or foot specialist as advised.  Call for an appointment if pain worsens or there is no relief after a few weeks of home treatment. Shoe inserts, a night splint, or a special boot may be required.  If X-rays were taken, you will be told of any new findings that may affect your care.  When to seek medical advice  Call your healthcare provider right away if any of these occur:    Foot swelling    Redness with increasing pain  Date Last Reviewed: 11/21/2015 2000-2017 [employer]. [address] [place] [address], [place] [address].  All rights reserved. This information is not intended as a substitute for professional medical care. Always follow your healthcare professional's instructions.                Follow-ups after your visit        Who to contact     If you have questions or need follow up information about today's clinic visit or your schedule please contact [facility] directly at [phone].  Normal or non-critical lab and imaging results will be communicated to you by MyChart, letter or phone within 4 business days after the clinic has received the results. If you do not hear from us within 7 days, please contact the clinic through [facility] or phone. If you have a critical or abnormal lab result, we will notify you by phone as soon as possible.  Submit refill requests through [employer] or call your pharmacy and they will forward the refill request to us. Please allow 3 business days for your refill to be completed.          Additional Information About Your Visit        MyChart Information     [employer] gives you secure access to your electronic health record. If you see a primary care provider, you can also send messages to your care team and make appointments. If you have questions, please call your primary care clinic.  If you do not have a primary care provider, please call [phone] and they will assist you.        Care EveryWhere ID     This is your Care EveryWhere ID. This could be used by other organizations to access your [facility] medical records  [id number]        Your Vitals Were     Pulse Temperature Respirations Pulse Oximetry BMI (Body Mass Index)       78 98  F (36.7  C) (Tympanic) 14 99% 24.37 kg/m2        Blood Pressure from Last 3 Encounters:   12/22/17 144/80   11/27/17 120/76   10/23/17 145/75    Weight from Last 3 Encounters:   12/22/17 129 lb (58.5 kg)   11/27/17 129 lb (58.5 kg)   08/21/17 123 lb 9.6 oz (56.1 kg)              We Performed the Following     EYE EXAM (SIMPLE-NONBILLABLE)           Today's Medication Changes          These changes are accurate as of: 12/22/17 11:16 AM.  If you have any questions, ask your nurse or doctor.               Start taking these medicines.        Dose/Directions    naproxen 500 MG tablet   Commonly known as:  NAPROSYN   Used for:  Plantar fasciitis   Started by:  Elin Arboleda NP        Dose:  500 mg   Take 1 tablet (500 mg) by mouth 2 times daily (with meals) for 14 days   Quantity:  28 tablet   Refills:  0            Where to get your medicines      [facility] medications [facility] [address] [facility] [address] [facility]  [address] [address]     Phone:  [phone]     naproxen 500 MG tablet                Primary Care Provider Office Phone # Fax #    Elin Arboleda [phone]       [address] [facility] [address]        Equal Access to Services     KEYA GILBERT : Hadii joby ayala hadasho Soomaali, waaxda luqadaha, qaybta kaalmada adeegyada, jannet mendoza haylucia diaz . So [facility] [phone].    ATENCIÓN: Si habla español, tiene a calhoun disposición servicios gratuitos de asistencia lingüística. Llame al [phone].    We comply with applicable federal civil rights laws and [place] laws. We do not discriminate on the basis of race, color, national origin, age, disability, sex, sexual orientation, or gender identity.            Thank you!     Thank you for choosing [facility]  for your care. Our goal is always to provide you with excellent care. Hearing back from our patients is one way we can continue to improve our services. Please take a few minutes to complete the written survey that you may receive in the mail after your visit with us. Thank you!             Your Updated Medication List - Protect others around you: Learn how to safely use, store and throw away your medicines at www.disposemymeds.org.          This list is accurate as of: 12/22/17 11:16 AM.  Always use  your most recent med list.                   Brand Name Dispense Instructions for use Diagnosis    ACE/ARB/ARNI NOT PRESCRIBED (INTENTIONAL)      Please choose reason not prescribed, below    Type 2 diabetes mellitus without complication, without long-term current use of insulin (H)       atorvastatin 10 MG tablet    LIPITOR    90 tablet    Take 1 tablet (10 mg) by mouth daily    Mixed hyperlipidemia       blood glucose monitoring lancets     102 each    USE TO TEST BLOOD SUGAR ONE TIME DAILY OR AS DIRECTED    Type 2 diabetes mellitus without complication, without long-term current use of insulin (H)       blood glucose monitoring test strip    no brand specified    100 strip    Use to test blood sugars daily or as directed    Type 2 diabetes mellitus without complication, without long-term current use of insulin (H)       CALCIUM + D PO      Take 1,200 mg by mouth daily        hydroquinone 4 % Crea     30 g    Apply nightly to face    Dermatosis papulosa nigra       Multi-vitamin Tabs tablet      Take 1 tablet by mouth daily        naproxen 500 MG tablet    NAPROSYN    28 tablet    Take 1 tablet (500 mg) by mouth 2 times daily (with meals) for 14 days    Plantar fasciitis       omega 3 1000 MG Caps     90 capsule    Take 1 g by mouth 2 times daily    CARDIOVASCULAR SCREENING; LDL GOAL LESS THAN 160       tretinoin 0.05 % cream    RETIN-A    20 g    Spread a pea size amount into affected area topically at bedtime.  Use sunscreen SPF>20.    Dermatosis papulosa nigra

## 2017-12-22 NOTE — PROGRESS NOTES
SUBJECTIVE:   Jen Benedict is a 57 year old female who presents to clinic today for the following health issues:      Musculoskeletal problem/pain      Duration: 3 weeks     Description  Location: left heel pain     Intensity:  moderate    Accompanying signs and symptoms: none    History  Previous similar problem: no   Previous evaluation:  DIABETIC PATIENT     Precipitating or alleviating factors:  Trauma or overuse: no   Aggravating factors include: overuse    Therapies tried and outcome: rest/inactivity and Ibuprofen    Lab Results   Component Value Date    A1C 6.3 11/27/2017    A1C 6.3 08/21/2017    A1C 6.7 05/01/2017       BP Readings from Last 6 Encounters:   12/22/17 144/80   11/27/17 120/76   10/23/17 145/75   09/25/17 141/85   08/21/17 136/72   08/21/17 134/80       Wt Readings from Last 4 Encounters:   12/22/17 129 lb (58.5 kg)   11/27/17 129 lb (58.5 kg)   08/21/17 123 lb 9.6 oz (56.1 kg)   06/02/17 132 lb (59.9 kg)           Problem list and histories reviewed & adjusted, as indicated.  Additional history: as documented    Labs reviewed in EPIC    Reviewed and updated as needed this visit by clinical staffAllergies       Reviewed and updated as needed this visit by Provider         ROS:  Constitutional, HEENT, cardiovascular, pulmonary, gi and gu systems are negative, except as otherwise noted.      OBJECTIVE:                                                    /80  Pulse 78  Temp 98  F (36.7  C) (Tympanic)  Resp 14  Wt 129 lb (58.5 kg)  SpO2 99%  BMI 24.37 kg/m2  Body mass index is 24.37 kg/(m^2).  GENERAL APPEARANCE: healthy, alert and no distress  RESP: lungs clear to auscultation - no rales, rhonchi or wheezes  CV: regular rates and rhythm, normal S1 S2, no S3 or S4 and no murmur, click or rub  ORTHO: Foot Exam: Inspection:  no swelling, calcaneal  Tender::calcaneous   Range of Motion:flexion of toes:  pain-free, extension of toes  pain-free        Diagnostic test  results:  Diagnostic Test Results:  none        ASSESSMENT/PLAN:                                                    1. Plantar fasciitis  Conservative treatment  Naproxen twice a day for 2 week   Stretches   Ice   Supportive shoes     Follow if not improved in 2-3 weeks - will refer to foot specialist     - naproxen (NAPROSYN) 500 MG tablet; Take 1 tablet (500 mg) by mouth 2 times daily (with meals) for 14 days  Dispense: 28 tablet; Refill: 0    2. Essential hypertension  Blood pressure is elevated   Patient reports she had caffeine this morning and has not been as strict with her diet   She will return to have her BP rechecked by RN in 2 weeks   Plantar Fasciitis  Plantar fasciitis is a painful swelling of the plantar fascia. The plantar fascia is a thick, fibrous layer of tissue. It covers the bones on the bottom of your foot. And it supports the foot bones in an arched position.  This can happen gradually or suddenly. It usually affects one foot at a time. Heel pain can be sharp, like a knife sticking into the bottom of your foot. You may feel pain after exercising, long-distance jogging, stair climbing, long periods of standing, or after standing up.  Risk factors include: non-active lifestyle, arthritis, diabetes, obesity or recent weight gain, flat foot, high arch. Wearing high heels, loose shoes, or shoes with poor arch support for long periods of time adds to the risk. This problem is commonly found in runners and dancers. It also found in people who stand on hard surfaces for long periods of time.  Foot pain from this condition is usually worse in the morning. But it improves with walking. By the end of the day there may be a dull aching. Treatment requires short-term rest and controlling swelling. It may take up to 9 months before all symptoms go away. Rarely, a steroid injection into the foot, or surgery, may be needed.  Home care    If you are overweight, lose weight to help healing.    Choose supportive  shoes with good arch support and shock absorbency. Replace athletic shoes when they become worn out. Don t walk or run barefoot.    Premade or custom-fitted shoe inserts may be helpful. Inserts made of silicone seem to be the most effective. Custom-made inserts can be provided by a podiatrist or foot specialist, physical therapist, or orthopedist.    Premade or custom-made night splints keep the heel stretched out while you sleep. They may prevent morning pain.    Avoid activities that stress the feet: jogging, prolonged standing or walking, contact sports, etc.    First thing in the morning and before sports, stretch the bottom of your feet. Gently flex your ankle so the toes move toward your knee.    Icing may help control heel pain. Apply an ice pack to the heel for 10-20 minutes as a preventive. Or ice your heel after a severe flare-up of symptoms. You may repeat this every 1-2 hours as needed.    You may use over-the-counter pain medicine to control pain, unless another medicine was prescribed. Anti-inflammatory pain medicines, such as ibuprofen or naproxen, may work better than acetaminophen. If you have chronic liver or kidney disease or ever had a stomach ulcer or GI bleeding, talk with your healthcare provider before using these medicines.  Follow-up care  Follow up with your healthcare provider, physical therapist, or podiatrist or foot specialist as advised.  Call for an appointment if pain worsens or there is no relief after a few weeks of home treatment. Shoe inserts, a night splint, or a special boot may be required.  If X-rays were taken, you will be told of any new findings that may affect your care.  When to seek medical advice  Call your healthcare provider right away if any of these occur:    Foot swelling    Redness with increasing pain  Date Last Reviewed: 11/21/2015 2000-2017 [employer]. [address] [place] [address], [place] [address]. All rights reserved. This information is not  intended as a substitute for professional medical care. Always follow your healthcare professional's instructions.            Elin Arboleda NP  [facility]

## 2017-12-22 NOTE — NURSING NOTE
"Chief Complaint   Patient presents with     Foot Injury     Heel pain left        Initial There were no vitals taken for this visit. Estimated body mass index is 24.37 kg/(m^2) as calculated from the following:    Height as of 11/27/17: 5' 1\" (1.549 m).    Weight as of 11/27/17: 129 lb (58.5 kg).  Medication Reconciliation: complete    Health Maintenance that is potentially due pending provider review:  Mammogram    Possibly completing today per provider review.    Is there anyone who you would like to be able to receive your results? No  If yes have patient fill out [facility]    "

## 2017-12-22 NOTE — PATIENT INSTRUCTIONS
1. Plantar fasciitis  Conservative treatment  Naproxen twice a day for 2 week   Stretches   Ice   Supportive shoes     Follow if not improved in 2-3 weeks - will refer to foot specialist     - naproxen (NAPROSYN) 500 MG tablet; Take 1 tablet (500 mg) by mouth 2 times daily (with meals) for 14 days  Dispense: 28 tablet; Refill: 0    Plantar Fasciitis  Plantar fasciitis is a painful swelling of the plantar fascia. The plantar fascia is a thick, fibrous layer of tissue. It covers the bones on the bottom of your foot. And it supports the foot bones in an arched position.  This can happen gradually or suddenly. It usually affects one foot at a time. Heel pain can be sharp, like a knife sticking into the bottom of your foot. You may feel pain after exercising, long-distance jogging, stair climbing, long periods of standing, or after standing up.  Risk factors include: non-active lifestyle, arthritis, diabetes, obesity or recent weight gain, flat foot, high arch. Wearing high heels, loose shoes, or shoes with poor arch support for long periods of time adds to the risk. This problem is commonly found in runners and dancers. It also found in people who stand on hard surfaces for long periods of time.  Foot pain from this condition is usually worse in the morning. But it improves with walking. By the end of the day there may be a dull aching. Treatment requires short-term rest and controlling swelling. It may take up to 9 months before all symptoms go away. Rarely, a steroid injection into the foot, or surgery, may be needed.  Home care    If you are overweight, lose weight to help healing.    Choose supportive shoes with good arch support and shock absorbency. Replace athletic shoes when they become worn out. Don t walk or run barefoot.    Premade or custom-fitted shoe inserts may be helpful. Inserts made of silicone seem to be the most effective. Custom-made inserts can be provided by a podiatrist or foot specialist,  physical therapist, or orthopedist.    Premade or custom-made night splints keep the heel stretched out while you sleep. They may prevent morning pain.    Avoid activities that stress the feet: jogging, prolonged standing or walking, contact sports, etc.    First thing in the morning and before sports, stretch the bottom of your feet. Gently flex your ankle so the toes move toward your knee.    Icing may help control heel pain. Apply an ice pack to the heel for 10-20 minutes as a preventive. Or ice your heel after a severe flare-up of symptoms. You may repeat this every 1-2 hours as needed.    You may use over-the-counter pain medicine to control pain, unless another medicine was prescribed. Anti-inflammatory pain medicines, such as ibuprofen or naproxen, may work better than acetaminophen. If you have chronic liver or kidney disease or ever had a stomach ulcer or GI bleeding, talk with your healthcare provider before using these medicines.  Follow-up care  Follow up with your healthcare provider, physical therapist, or podiatrist or foot specialist as advised.  Call for an appointment if pain worsens or there is no relief after a few weeks of home treatment. Shoe inserts, a night splint, or a special boot may be required.  If X-rays were taken, you will be told of any new findings that may affect your care.  When to seek medical advice  Call your healthcare provider right away if any of these occur:    Foot swelling    Redness with increasing pain  Date Last Reviewed: 11/21/2015 2000-2017 [employer]. [address] [place] [address], [place] [address]. All rights reserved. This information is not intended as a substitute for professional medical care. Always follow your healthcare professional's instructions.

## 2018-01-24 ENCOUNTER — MYC MEDICAL ADVICE (OUTPATIENT)
Dept: FAMILY MEDICINE | Facility: CLINIC | Age: 58
End: 2018-01-24

## 2018-01-24 DIAGNOSIS — M72.2 PLANTAR FASCIITIS: Primary | ICD-10-CM

## 2018-01-24 NOTE — TELEPHONE ENCOUNTER
ASSESSMENT/PLAN:      1. Plantar fasciitis  Conservative treatment  Naproxen twice a day for 2 week   Stretches   Ice   Supportive shoes      Follow if not improved in 2-3 weeks - will refer to foot specialist      - naproxen (NAPROSYN) 500 MG tablet; Take 1 tablet (500 mg) by mouth 2 times daily (with meals) for 14 days  Dispense: 28 tablet; Refill: 0

## 2018-01-25 RX ORDER — NAPROXEN 500 MG/1
500 TABLET ORAL 2 TIMES DAILY WITH MEALS
Qty: 60 TABLET | Refills: 1 | Status: SHIPPED | OUTPATIENT
Start: 2018-01-25 | End: 2018-08-20

## 2018-01-26 ENCOUNTER — TELEPHONE (OUTPATIENT)
Dept: FAMILY MEDICINE | Facility: CLINIC | Age: 58
End: 2018-01-26

## 2018-01-26 ENCOUNTER — ALLIED HEALTH/NURSE VISIT (OUTPATIENT)
Dept: FAMILY MEDICINE | Facility: CLINIC | Age: 58
End: 2018-01-26
Payer: COMMERCIAL

## 2018-01-26 VITALS — SYSTOLIC BLOOD PRESSURE: 130 MMHG | HEART RATE: 80 BPM | DIASTOLIC BLOOD PRESSURE: 80 MMHG

## 2018-01-26 DIAGNOSIS — Z01.30 BLOOD PRESSURE CHECK: Primary | ICD-10-CM

## 2018-01-26 PROCEDURE — 99207 ZZC NO CHARGE NURSE ONLY: CPT

## 2018-01-26 NOTE — MR AVS SNAPSHOT
After Visit Summary   1/26/2018    Jen Benedict    MRN: [id number]           Patient Information     Date Of Birth          1960        Visit Information        Provider Department      1/26/2018 11:00 AM JO SIERRA RN [facility]        Today's Diagnoses     Blood pressure check    -  1       Follow-ups after your visit        Who to contact     If you have questions or need follow up information about today's clinic visit or your schedule please contact [facility] directly at [phone].  Normal or non-critical lab and imaging results will be communicated to you by [employer], letter or phone within 4 business days after the clinic has received the results. If you do not hear from us within 7 days, please contact the clinic through [employer] or phone. If you have a critical or abnormal lab result, we will notify you by phone as soon as possible.  Submit refill requests through [employer] or call your pharmacy and they will forward the refill request to us. Please allow 3 business days for your refill to be completed.          Additional Information About Your Visit        MyChart Information     [employer] gives you secure access to your electronic health record. If you see a primary care provider, you can also send messages to your care team and make appointments. If you have questions, please call your primary care clinic.  If you do not have a primary care provider, please call [phone] and they will assist you.        Care EveryWhere ID     This is your Care EveryWhere ID. This could be used by other organizations to access your [facility] medical records  [id number]        Your Vitals Were     Pulse                   80            Blood Pressure from Last 3 Encounters:   01/26/18 130/80   12/22/17 144/80   11/27/17 120/76    Weight from Last 3 Encounters:   12/22/17 129 lb (58.5 kg)   11/27/17 129 lb (58.5 kg)   08/21/17 123 lb 9.6 oz (56.1 kg)              Today,  you had the following     No orders found for display       Primary Care Provider Office Phone # Fax #    Elin Arboleda, [phone]       [address] [facility] [address]        Equal Access to Services     KEYA GILBERT : Hadii aad ku hadraveno Soomaali, waaxda luqadaha, qaybta kaalmada adeegyada, jannet readn ektagary whitlock emily pina. So [facility] [phone].    ATENCIÓN: Si habla español, tiene a calhoun disposición servicios gratuitos de asistencia lingüística. Llame al [phone].    We comply with applicable federal civil rights laws and [place] laws. We do not discriminate on the basis of race, color, national origin, age, disability, sex, sexual orientation, or gender identity.            Thank you!     Thank you for choosing [facility]  for your care. Our goal is always to provide you with excellent care. Hearing back from our patients is one way we can continue to improve our services. Please take a few minutes to complete the written survey that you may receive in the mail after your visit with us. Thank you!             Your Updated Medication List - Protect others around you: Learn how to safely use, store and throw away your medicines at www.disposemymeds.org.          This list is accurate as of 1/26/18 11:24 AM.  Always use your most recent med list.                   Brand Name Dispense Instructions for use Diagnosis    ACE/ARB/ARNI NOT PRESCRIBED (INTENTIONAL)      Please choose reason not prescribed, below    Type 2 diabetes mellitus without complication, without long-term current use of insulin (H)       atorvastatin 10 MG tablet    LIPITOR    90 tablet    Take 1 tablet (10 mg) by mouth daily    Mixed hyperlipidemia       blood glucose monitoring lancets     102 each    USE TO TEST BLOOD SUGAR ONE TIME DAILY OR AS DIRECTED    Type 2 diabetes mellitus without complication, without long-term current use of insulin (H)       blood glucose monitoring test strip     no brand specified    100 strip    Use to test blood sugars daily or as directed    Type 2 diabetes mellitus without complication, without long-term current use of insulin (H)       CALCIUM + D PO      Take 1,200 mg by mouth daily        hydroquinone 4 % Crea     30 g    Apply nightly to face    Dermatosis papulosa nigra       Multi-vitamin Tabs tablet      Take 1 tablet by mouth daily        naproxen 500 MG tablet    NAPROSYN    60 tablet    Take 1 tablet (500 mg) by mouth 2 times daily (with meals)    Plantar fasciitis       omega 3 1000 MG Caps     90 capsule    Take 1 g by mouth 2 times daily    CARDIOVASCULAR SCREENING; LDL GOAL LESS THAN 160       tretinoin 0.05 % cream    RETIN-A    20 g    Spread a pea size amount into affected area topically at bedtime.  Use sunscreen SPF>20.    Dermatosis papulosa nigra

## 2018-01-26 NOTE — PROGRESS NOTES
S-(situation): RN visit for BP check in F/U to 12-22-17 OV, BP elevated.    B-(background): No antihypertensive meds. Co- worker/ nurse checked BP recently 126/?.    A-(assessment):   BP Readings from Last 6 Encounters:   01/26/18 130/80   12/22/17 144/80   11/27/17 120/76   10/23/17 145/75   09/25/17 141/85   08/21/17 136/72     HR- 80. BP WNL.     R-(recommendations): Forwarded to Gabbie for review.   KRISTY Swenson RN

## 2018-02-26 ENCOUNTER — MYC MEDICAL ADVICE (OUTPATIENT)
Dept: FAMILY MEDICINE | Facility: CLINIC | Age: 58
End: 2018-02-26

## 2018-03-06 ENCOUNTER — MYC MEDICAL ADVICE (OUTPATIENT)
Dept: FAMILY MEDICINE | Facility: CLINIC | Age: 58
End: 2018-03-06

## 2018-03-06 DIAGNOSIS — E78.2 MIXED HYPERLIPIDEMIA: ICD-10-CM

## 2018-03-06 DIAGNOSIS — Z13.6 CARDIOVASCULAR SCREENING; LDL GOAL LESS THAN 160: Primary | ICD-10-CM

## 2018-03-06 RX ORDER — ATORVASTATIN CALCIUM 10 MG/1
10 TABLET, FILM COATED ORAL DAILY
Qty: 30 TABLET | Refills: 0 | Status: SHIPPED | OUTPATIENT
Start: 2018-03-06 | End: 2018-09-18

## 2018-03-06 NOTE — TELEPHONE ENCOUNTER
Medication (atorvastatin) is being filled for 1 time refill only due to:  Patient needs labs Lipids. Future labs ordered lipids. [employer] message sent to patient.    Idalmis DUNN RN

## 2018-04-04 ENCOUNTER — MYC MEDICAL ADVICE (OUTPATIENT)
Dept: FAMILY MEDICINE | Facility: CLINIC | Age: 58
End: 2018-04-04

## 2018-04-04 ENCOUNTER — TELEPHONE (OUTPATIENT)
Dept: FAMILY MEDICINE | Facility: CLINIC | Age: 58
End: 2018-04-04

## 2018-04-04 DIAGNOSIS — E11.9 TYPE 2 DIABETES MELLITUS WITHOUT COMPLICATION, WITHOUT LONG-TERM CURRENT USE OF INSULIN (H): ICD-10-CM

## 2018-04-04 NOTE — TELEPHONE ENCOUNTER
Received fax from [employer] stating insurance covered contour. Patient needs new meter and lancets Rx. Please send. Thanks.    [place] [facility]

## 2018-04-13 ENCOUNTER — OFFICE VISIT (OUTPATIENT)
Dept: FAMILY MEDICINE | Facility: CLINIC | Age: 58
End: 2018-04-13
Payer: COMMERCIAL

## 2018-04-13 VITALS
OXYGEN SATURATION: 97 % | BODY MASS INDEX: 26.43 KG/M2 | HEART RATE: 81 BPM | DIASTOLIC BLOOD PRESSURE: 80 MMHG | WEIGHT: 140 LBS | TEMPERATURE: 98 F | SYSTOLIC BLOOD PRESSURE: 138 MMHG | HEIGHT: 61 IN

## 2018-04-13 DIAGNOSIS — J20.9 ACUTE BRONCHITIS WITH SYMPTOMS > 10 DAYS: Primary | ICD-10-CM

## 2018-04-13 DIAGNOSIS — E11.9 TYPE 2 DIABETES MELLITUS WITHOUT COMPLICATION, WITHOUT LONG-TERM CURRENT USE OF INSULIN (H): ICD-10-CM

## 2018-04-13 PROCEDURE — 99214 OFFICE O/P EST MOD 30 MIN: CPT | Performed by: NURSE PRACTITIONER

## 2018-04-13 RX ORDER — AZITHROMYCIN 250 MG/1
TABLET, FILM COATED ORAL
Qty: 6 TABLET | Refills: 0 | Status: SHIPPED | OUTPATIENT
Start: 2018-04-13 | End: 2018-08-20

## 2018-04-13 RX ORDER — ALBUTEROL SULFATE 90 UG/1
2 AEROSOL, METERED RESPIRATORY (INHALATION) EVERY 6 HOURS PRN
Qty: 1 INHALER | Refills: 0 | Status: SHIPPED | OUTPATIENT
Start: 2018-04-13 | End: 2018-08-20

## 2018-04-13 NOTE — MR AVS SNAPSHOT
After Visit Summary   4/13/2018    Jen Benedict    MRN: [id number]           Patient Information     Date Of Birth          1960        Visit Information        Provider Department      4/13/2018 10:20 AM Elin Arboleda NP [facility]        Today's Diagnoses     Acute bronchitis with symptoms > 10 days    -  1    Type 2 diabetes mellitus without complication, without long-term current use of insulin (H)          Care Instructions    1. Acute bronchitis with symptoms > 10 days  Will treat with a antibiotic and inhaler   - azithromycin (ZITHROMAX) 250 MG tablet; Two tablets first day, then one tablet daily for four days.  Dispense: 6 tablet; Refill: 0  - albuterol (PROAIR HFA/PROVENTIL HFA/VENTOLIN HFA) 108 (90 Base) MCG/ACT Inhaler; Inhale 2 puffs into the lungs every 6 hours as needed for shortness of breath / dyspnea or wheezing  Dispense: 1 Inhaler; Refill: 0    2. Type 2 diabetes mellitus without complication, without long-term current use of insulin (H)  Come in in the next few weeks for fasting labs     Acute Bronchitis  Your healthcare provider has told you that you have acute bronchitis. Bronchitis is infection or inflammation of the bronchial tubes (airways in the lungs). Normally, air moves easily in and out of the airways. Bronchitis narrows the airways, making it harder for air to flow in and out of the lungs. This causes symptoms such as shortness of breath, coughing up yellow or green mucus, and wheezing. Bronchitis can be acute or chronic. Acute means the condition comes on quickly and goes away in a short time, usually within 3 to 10 days. Chronic means a condition lasts a long time and often comes back.    What causes acute bronchitis?  Acute bronchitis almost always starts as a viral respiratory infection, such as a cold or the flu. Certain factors make it more likely for a cold or flu to turn into bronchitis. These include being very young, being elderly,  having a heart or lung problem, or having a weak immune system. Cigarette smoking also makes bronchitis more likely.  When bronchitis develops, the airways become swollen. The airways may also become infected with bacteria. This is known as a secondary infection.  Diagnosing acute bronchitis  Your healthcare provider will examine you and ask about your symptoms and health history. You may also have a sputum culture to test the fluid in your lungs. Chest X-rays may be done to look for infection in the lungs.  Treating acute bronchitis  Bronchitis usually clears up as the cold or flu goes away. You can help feel better faster by doing the following:    Take medicine as directed. You may be told to take ibuprofen or other over-the-counter medicines. These help relieve inflammation in your bronchial tubes. Your healthcare provider may prescribe an inhaler to help open up the bronchial tubes. Most of the time, acute bronchitis is caused by a viral infection. Antibiotics are usually not prescribed for viral infections.    Drink plenty of fluids, such as water, juice, or warm soup. Fluids loosen mucus so that you can cough it up. This helps you breathe more easily. Fluids also prevent dehydration.    Make sure you get plenty of rest.    Do not smoke. Do not allow anyone else to smoke in your home.  Recovery and follow-up  Follow up with your doctor as you are told. You will likely feel better in a week or two. But a dry cough can linger beyond that time. Let your doctor know if you still have symptoms (other than a dry cough) after 2 weeks, or if you re prone to getting bronchial infections. Take steps to protect yourself from future infections. These steps include stopping smoking and avoiding tobacco smoke, washing your hands often, and getting a yearly flu shot.  When to call your healthcare provider  Call the healthcare provider if you have any of the following:    Fever of 100.4 F (38.0 C) or higher, or as  advised    Symptoms that get worse, or new symptoms    Trouble breathing    Symptoms that don t start to improve within a week, or within 3 days of taking antibiotics   Date Last Reviewed: 12/1/2016 2000-2017 [employer]. [address] [place] [address], [place] [address]. All rights reserved. This information is not intended as a substitute for professional medical care. Always follow your healthcare professional's instructions.      Follow if not better after the antibiotic           Follow-ups after your visit        Future tests that were ordered for you today     Open Future Orders        Priority Expected Expires Ordered    Hemoglobin A1c Routine  4/13/2019 4/13/2018            Who to contact     If you have questions or need follow up information about today's clinic visit or your schedule please contact [facility] directly at [phone].  Normal or non-critical lab and imaging results will be communicated to you by MyChart, letter or phone within 4 business days after the clinic has received the results. If you do not hear from us within 7 days, please contact the clinic through [email or web address] or phone. If you have a critical or abnormal lab result, we will notify you by phone as soon as possible.  Submit refill requests through [employer] or call your pharmacy and they will forward the refill request to us. Please allow 3 business days for your refill to be completed.          Additional Information About Your Visit        [email or web address] Information     [employer] gives you secure access to your electronic health record. If you see a primary care provider, you can also send messages to your care team and make appointments. If you have questions, please call your primary care clinic.  If you do not have a primary care provider, please call [phone] and they will assist you.        Care EveryWhere ID     This is your Care EveryWhere ID. This could be used by other organizations to access your  "[facility] medical records  [id number]        Your Vitals Were     Pulse Temperature Height Pulse Oximetry BMI (Body Mass Index)       81 98  F (36.7  C) (Tympanic) 5' 1\" (1.549 m) 97% 26.45 kg/m2        Blood Pressure from Last 3 Encounters:   04/13/18 138/80   01/26/18 130/80   12/22/17 144/80    Weight from Last 3 Encounters:   04/13/18 140 lb (63.5 kg)   12/22/17 129 lb (58.5 kg)   11/27/17 129 lb (58.5 kg)                 Today's Medication Changes          These changes are accurate as of 4/13/18 11:03 AM.  If you have any questions, ask your nurse or doctor.               Start taking these medicines.        Dose/Directions    albuterol 108 (90 Base) MCG/ACT Inhaler   Commonly known as:  PROAIR HFA/PROVENTIL HFA/VENTOLIN HFA   Used for:  Acute bronchitis with symptoms > 10 days   Started by:  Elin Arboleda NP        Dose:  2 puff   Inhale 2 puffs into the lungs every 6 hours as needed for shortness of breath / dyspnea or wheezing   Quantity:  1 Inhaler   Refills:  0       azithromycin 250 MG tablet   Commonly known as:  ZITHROMAX   Used for:  Acute bronchitis with symptoms > 10 days   Started by:  Elin Arboleda NP        Two tablets first day, then one tablet daily for four days.   Quantity:  6 tablet   Refills:  0            Where to get your medicines      These medications were sent to [facility] [address] [place] [address]  [address] [place] [id number]     Phone:  [phone]     albuterol 108 (90 Base) MCG/ACT Inhaler    azithromycin 250 MG tablet                Primary Care Provider Office Phone # Fax #    Elin Arboleda [phone]       [address] [facility] [address]        Equal Access to Services     KEYA GILBERT AH: Joseph Adhikari, waalexandreda luqaugust, qaybta kaalmaalicia jara, jannet pina. So [facility] [phone].    ATENCIÓN: Si habla español, tiene a calhoun disposición servicios gratuitos de asistencia " lingüísticaMary De La Rosa al [phone].    We comply with applicable federal civil rights laws and [place] laws. We do not discriminate on the basis of race, color, national origin, age, disability, sex, sexual orientation, or gender identity.            Thank you!     Thank you for choosing [facility]  for your care. Our goal is always to provide you with excellent care. Hearing back from our patients is one way we can continue to improve our services. Please take a few minutes to complete the written survey that you may receive in the mail after your visit with us. Thank you!             Your Updated Medication List - Protect others around you: Learn how to safely use, store and throw away your medicines at www.disposemymeds.org.          This list is accurate as of 4/13/18 11:03 AM.  Always use your most recent med list.                   Brand Name Dispense Instructions for use Diagnosis    ACE/ARB/ARNI NOT PRESCRIBED (INTENTIONAL)      Please choose reason not prescribed, below    Type 2 diabetes mellitus without complication, without long-term current use of insulin (H)       albuterol 108 (90 Base) MCG/ACT Inhaler    PROAIR HFA/PROVENTIL HFA/VENTOLIN HFA    1 Inhaler    Inhale 2 puffs into the lungs every 6 hours as needed for shortness of breath / dyspnea or wheezing    Acute bronchitis with symptoms > 10 days       atorvastatin 10 MG tablet    LIPITOR    30 tablet    Take 1 tablet (10 mg) by mouth daily    Mixed hyperlipidemia       azithromycin 250 MG tablet    ZITHROMAX    6 tablet    Two tablets first day, then one tablet daily for four days.    Acute bronchitis with symptoms > 10 days       blood glucose lancets standard    no brand specified    100 each    Use to test blood sugar daily or as directed.    Type 2 diabetes mellitus without complication, without long-term current use of insulin (H)       blood glucose monitoring meter device kit    no brand specified    1 kit    Use to test  blood sugar daily or as directed.    Type 2 diabetes mellitus without complication, without long-term current use of insulin (H)       blood glucose monitoring test strip    no brand specified    100 strip    Use to test blood sugars daily or as directed    Type 2 diabetes mellitus without complication, without long-term current use of insulin (H)       CALCIUM + D PO      Take 1,200 mg by mouth daily        hydroquinone 4 % Crea     30 g    Apply nightly to face    Dermatosis papulosa nigra       Multi-vitamin Tabs tablet      Take 1 tablet by mouth daily        naproxen 500 MG tablet    NAPROSYN    60 tablet    Take 1 tablet (500 mg) by mouth 2 times daily (with meals)    Plantar fasciitis       omega 3 1000 MG Caps     90 capsule    Take 1 g by mouth 2 times daily    CARDIOVASCULAR SCREENING; LDL GOAL LESS THAN 160       tretinoin 0.05 % cream    RETIN-A    20 g    Spread a pea size amount into affected area topically at bedtime.  Use sunscreen SPF>20.    Dermatosis papulosa nigra

## 2018-04-13 NOTE — PATIENT INSTRUCTIONS
1. Acute bronchitis with symptoms > 10 days  Will treat with a antibiotic and inhaler   - azithromycin (ZITHROMAX) 250 MG tablet; Two tablets first day, then one tablet daily for four days.  Dispense: 6 tablet; Refill: 0  - albuterol (PROAIR HFA/PROVENTIL HFA/VENTOLIN HFA) 108 (90 Base) MCG/ACT Inhaler; Inhale 2 puffs into the lungs every 6 hours as needed for shortness of breath / dyspnea or wheezing  Dispense: 1 Inhaler; Refill: 0    2. Type 2 diabetes mellitus without complication, without long-term current use of insulin (H)  Come in in the next few weeks for fasting labs     Acute Bronchitis  Your healthcare provider has told you that you have acute bronchitis. Bronchitis is infection or inflammation of the bronchial tubes (airways in the lungs). Normally, air moves easily in and out of the airways. Bronchitis narrows the airways, making it harder for air to flow in and out of the lungs. This causes symptoms such as shortness of breath, coughing up yellow or green mucus, and wheezing. Bronchitis can be acute or chronic. Acute means the condition comes on quickly and goes away in a short time, usually within 3 to 10 days. Chronic means a condition lasts a long time and often comes back.    What causes acute bronchitis?  Acute bronchitis almost always starts as a viral respiratory infection, such as a cold or the flu. Certain factors make it more likely for a cold or flu to turn into bronchitis. These include being very young, being elderly, having a heart or lung problem, or having a weak immune system. Cigarette smoking also makes bronchitis more likely.  When bronchitis develops, the airways become swollen. The airways may also become infected with bacteria. This is known as a secondary infection.  Diagnosing acute bronchitis  Your healthcare provider will examine you and ask about your symptoms and health history. You may also have a sputum culture to test the fluid in your lungs. Chest X-rays may be done to  look for infection in the lungs.  Treating acute bronchitis  Bronchitis usually clears up as the cold or flu goes away. You can help feel better faster by doing the following:    Take medicine as directed. You may be told to take ibuprofen or other over-the-counter medicines. These help relieve inflammation in your bronchial tubes. Your healthcare provider may prescribe an inhaler to help open up the bronchial tubes. Most of the time, acute bronchitis is caused by a viral infection. Antibiotics are usually not prescribed for viral infections.    Drink plenty of fluids, such as water, juice, or warm soup. Fluids loosen mucus so that you can cough it up. This helps you breathe more easily. Fluids also prevent dehydration.    Make sure you get plenty of rest.    Do not smoke. Do not allow anyone else to smoke in your home.  Recovery and follow-up  Follow up with your doctor as you are told. You will likely feel better in a week or two. But a dry cough can linger beyond that time. Let your doctor know if you still have symptoms (other than a dry cough) after 2 weeks, or if you re prone to getting bronchial infections. Take steps to protect yourself from future infections. These steps include stopping smoking and avoiding tobacco smoke, washing your hands often, and getting a yearly flu shot.  When to call your healthcare provider  Call the healthcare provider if you have any of the following:    Fever of 100.4 F (38.0 C) or higher, or as advised    Symptoms that get worse, or new symptoms    Trouble breathing    Symptoms that don t start to improve within a week, or within 3 days of taking antibiotics   Date Last Reviewed: 12/1/2016 2000-2017 [employer]. [address] [place] [address], [place] [address]. All rights reserved. This information is not intended as a substitute for professional medical care. Always follow your healthcare professional's instructions.      Follow if not better after the antibiotic

## 2018-04-13 NOTE — PROGRESS NOTES
"  SUBJECTIVE:   Jen Benedict is a 57 year old female who presents to clinic today for the following health issues:    ENT Symptoms             Symptoms: cc Present Absent Comment   Fever/Chills   x    Fatigue   x    Muscle Aches   x    Eye Irritation   x    Sneezing   x    Nasal Bobo/Drg  x     Sinus Pressure/Pain   x    Loss of smell   x    Dental pain   x    Sore Throat   x    Swollen Glands   x    Ear Pain/Fullness   x    Cough x x  Clear but productive    Wheeze  x     Chest Pain   x    Shortness of breath   x    Rash   x    Other   x      Symptom duration:  2 weeks    Symptom severity:  moderate    Treatments tried:  robitussin, decongestant, tea    Contacts:  None     Maybe a little better   Worse with exertion       Problem list and histories reviewed & adjusted, as indicated.  Additional history: as documented    Labs reviewed in EPIC    Reviewed and updated as needed this visit by clinical staff       Reviewed and updated as needed this visit by Provider         ROS:  Constitutional, HEENT, cardiovascular, pulmonary, gi and gu systems are negative, except as otherwise noted.    OBJECTIVE:                                                    /80  Pulse 81  Temp 98  F (36.7  C) (Tympanic)  Ht 5' 1\" (1.549 m)  Wt 140 lb (63.5 kg)  SpO2 97%  BMI 26.45 kg/m2  Body mass index is 26.45 kg/(m^2).  GENERAL APPEARANCE: healthy, alert and no distress  HENT: ear canals and TM's normal and nose and mouth without ulcers or lesions  RESP: lungs clear to auscultation - no rales, rhonchi or wheezes  CV: regular rates and rhythm, normal S1 S2, no S3 or S4 and no murmur, click or rub  ABDOMEN: soft, nontender, without hepatosplenomegaly or masses and bowel sounds normal  MS: extremities normal- no gross deformities noted  SKIN: no suspicious lesions or rashes  NEURO: Normal strength and tone, mentation intact and speech normal  PSYCH: mentation appears normal and affect normal/bright    Diagnostic test " results:  Diagnostic Test Results:  none      ASSESSMENT/PLAN:                                                        1. Acute bronchitis with symptoms > 10 days  Will treat with a antibiotic and inhaler   - azithromycin (ZITHROMAX) 250 MG tablet; Two tablets first day, then one tablet daily for four days.  Dispense: 6 tablet; Refill: 0  - albuterol (PROAIR HFA/PROVENTIL HFA/VENTOLIN HFA) 108 (90 Base) MCG/ACT Inhaler; Inhale 2 puffs into the lungs every 6 hours as needed for shortness of breath / dyspnea or wheezing  Dispense: 1 Inhaler; Refill: 0    2. Type 2 diabetes mellitus without complication, without long-term current use of insulin (H)  Come in in the next few weeks for fasting labs     Acute Bronchitis  Your healthcare provider has told you that you have acute bronchitis. Bronchitis is infection or inflammation of the bronchial tubes (airways in the lungs). Normally, air moves easily in and out of the airways. Bronchitis narrows the airways, making it harder for air to flow in and out of the lungs. This causes symptoms such as shortness of breath, coughing up yellow or green mucus, and wheezing. Bronchitis can be acute or chronic. Acute means the condition comes on quickly and goes away in a short time, usually within 3 to 10 days. Chronic means a condition lasts a long time and often comes back.    What causes acute bronchitis?  Acute bronchitis almost always starts as a viral respiratory infection, such as a cold or the flu. Certain factors make it more likely for a cold or flu to turn into bronchitis. These include being very young, being elderly, having a heart or lung problem, or having a weak immune system. Cigarette smoking also makes bronchitis more likely.  When bronchitis develops, the airways become swollen. The airways may also become infected with bacteria. This is known as a secondary infection.  Diagnosing acute bronchitis  Your healthcare provider will examine you and ask about your  symptoms and health history. You may also have a sputum culture to test the fluid in your lungs. Chest X-rays may be done to look for infection in the lungs.  Treating acute bronchitis  Bronchitis usually clears up as the cold or flu goes away. You can help feel better faster by doing the following:    Take medicine as directed. You may be told to take ibuprofen or other over-the-counter medicines. These help relieve inflammation in your bronchial tubes. Your healthcare provider may prescribe an inhaler to help open up the bronchial tubes. Most of the time, acute bronchitis is caused by a viral infection. Antibiotics are usually not prescribed for viral infections.    Drink plenty of fluids, such as water, juice, or warm soup. Fluids loosen mucus so that you can cough it up. This helps you breathe more easily. Fluids also prevent dehydration.    Make sure you get plenty of rest.    Do not smoke. Do not allow anyone else to smoke in your home.  Recovery and follow-up  Follow up with your doctor as you are told. You will likely feel better in a week or two. But a dry cough can linger beyond that time. Let your doctor know if you still have symptoms (other than a dry cough) after 2 weeks, or if you re prone to getting bronchial infections. Take steps to protect yourself from future infections. These steps include stopping smoking and avoiding tobacco smoke, washing your hands often, and getting a yearly flu shot.  When to call your healthcare provider  Call the healthcare provider if you have any of the following:    Fever of 100.4 F (38.0 C) or higher, or as advised    Symptoms that get worse, or new symptoms    Trouble breathing    Symptoms that don t start to improve within a week, or within 3 days of taking antibiotics   Date Last Reviewed: 12/1/2016 2000-2017 [employer]. [address] [place] [address], [place] [address]. All rights reserved. This information is not intended as a substitute for  professional medical care. Always follow your healthcare professional's instructions.      Follow if not better after the antibiotic       Elin Arboleda NP  [facility]

## 2018-04-20 DIAGNOSIS — E11.9 TYPE 2 DIABETES MELLITUS WITHOUT COMPLICATION, WITHOUT LONG-TERM CURRENT USE OF INSULIN (H): ICD-10-CM

## 2018-04-20 DIAGNOSIS — Z13.6 CARDIOVASCULAR SCREENING; LDL GOAL LESS THAN 160: ICD-10-CM

## 2018-04-20 LAB
CHOLEST SERPL-MCNC: 245 MG/DL
HBA1C MFR BLD: 6.4 % (ref 0–5.6)
HDLC SERPL-MCNC: 60 MG/DL
LDLC SERPL CALC-MCNC: 163 MG/DL
NONHDLC SERPL-MCNC: 185 MG/DL
TRIGL SERPL-MCNC: 112 MG/DL

## 2018-04-20 PROCEDURE — 36415 COLL VENOUS BLD VENIPUNCTURE: CPT | Performed by: NURSE PRACTITIONER

## 2018-04-20 PROCEDURE — 83036 HEMOGLOBIN GLYCOSYLATED A1C: CPT | Performed by: NURSE PRACTITIONER

## 2018-04-20 PROCEDURE — 80061 LIPID PANEL: CPT | Performed by: NURSE PRACTITIONER

## 2018-06-18 PROBLEM — I10 ESSENTIAL HYPERTENSION: Status: ACTIVE | Noted: 2017-12-22

## 2018-08-20 ENCOUNTER — OFFICE VISIT (OUTPATIENT)
Dept: FAMILY MEDICINE | Facility: CLINIC | Age: 58
End: 2018-08-20
Payer: COMMERCIAL

## 2018-08-20 VITALS
BODY MASS INDEX: 25.7 KG/M2 | DIASTOLIC BLOOD PRESSURE: 84 MMHG | HEART RATE: 72 BPM | WEIGHT: 136 LBS | RESPIRATION RATE: 16 BRPM | SYSTOLIC BLOOD PRESSURE: 138 MMHG | TEMPERATURE: 98.4 F

## 2018-08-20 DIAGNOSIS — E11.9 TYPE 2 DIABETES MELLITUS WITHOUT COMPLICATION, WITHOUT LONG-TERM CURRENT USE OF INSULIN (H): ICD-10-CM

## 2018-08-20 DIAGNOSIS — G44.209 TENSION HEADACHE: ICD-10-CM

## 2018-08-20 DIAGNOSIS — E78.2 MIXED HYPERLIPIDEMIA: ICD-10-CM

## 2018-08-20 DIAGNOSIS — R07.9 CHEST PAIN, UNSPECIFIED TYPE: Primary | ICD-10-CM

## 2018-08-20 DIAGNOSIS — S39.012A STRAIN OF LUMBAR REGION, INITIAL ENCOUNTER: ICD-10-CM

## 2018-08-20 DIAGNOSIS — I10 ESSENTIAL HYPERTENSION: ICD-10-CM

## 2018-08-20 DIAGNOSIS — Z12.11 SCREENING FOR COLON CANCER: ICD-10-CM

## 2018-08-20 LAB
ANION GAP SERPL CALCULATED.3IONS-SCNC: 7 MMOL/L (ref 3–14)
BUN SERPL-MCNC: 16 MG/DL (ref 7–30)
CALCIUM SERPL-MCNC: 8.9 MG/DL (ref 8.5–10.1)
CHLORIDE SERPL-SCNC: 103 MMOL/L (ref 94–109)
CO2 SERPL-SCNC: 27 MMOL/L (ref 20–32)
CREAT SERPL-MCNC: 0.62 MG/DL (ref 0.52–1.04)
ERYTHROCYTE [DISTWIDTH] IN BLOOD BY AUTOMATED COUNT: 13 % (ref 10–15)
GFR SERPL CREATININE-BSD FRML MDRD: >90 ML/MIN/1.7M2
GLUCOSE SERPL-MCNC: 106 MG/DL (ref 70–99)
HBA1C MFR BLD: 6.6 % (ref 0–5.6)
HCT VFR BLD AUTO: 41.4 % (ref 35–47)
HGB BLD-MCNC: 13.5 G/DL (ref 11.7–15.7)
MCH RBC QN AUTO: 28.8 PG (ref 26.5–33)
MCHC RBC AUTO-ENTMCNC: 32.6 G/DL (ref 31.5–36.5)
MCV RBC AUTO: 89 FL (ref 78–100)
PLATELET # BLD AUTO: 392 10E9/L (ref 150–450)
POTASSIUM SERPL-SCNC: 4.6 MMOL/L (ref 3.4–5.3)
RBC # BLD AUTO: 4.68 10E12/L (ref 3.8–5.2)
SODIUM SERPL-SCNC: 137 MMOL/L (ref 133–144)
TSH SERPL DL<=0.005 MIU/L-ACNC: 0.61 MU/L (ref 0.4–4)
WBC # BLD AUTO: 8.8 10E9/L (ref 4–11)

## 2018-08-20 PROCEDURE — 84443 ASSAY THYROID STIM HORMONE: CPT | Performed by: NURSE PRACTITIONER

## 2018-08-20 PROCEDURE — 93000 ELECTROCARDIOGRAM COMPLETE: CPT | Performed by: NURSE PRACTITIONER

## 2018-08-20 PROCEDURE — 36415 COLL VENOUS BLD VENIPUNCTURE: CPT | Performed by: NURSE PRACTITIONER

## 2018-08-20 PROCEDURE — 99214 OFFICE O/P EST MOD 30 MIN: CPT | Performed by: NURSE PRACTITIONER

## 2018-08-20 PROCEDURE — 85027 COMPLETE CBC AUTOMATED: CPT | Performed by: NURSE PRACTITIONER

## 2018-08-20 PROCEDURE — 80048 BASIC METABOLIC PNL TOTAL CA: CPT | Performed by: NURSE PRACTITIONER

## 2018-08-20 PROCEDURE — 83036 HEMOGLOBIN GLYCOSYLATED A1C: CPT | Performed by: NURSE PRACTITIONER

## 2018-08-20 RX ORDER — CYCLOBENZAPRINE HCL 5 MG
5 TABLET ORAL 3 TIMES DAILY PRN
Qty: 42 TABLET | Refills: 0 | Status: SHIPPED | OUTPATIENT
Start: 2018-08-20 | End: 2018-08-28

## 2018-08-20 ASSESSMENT — PAIN SCALES - GENERAL: PAINLEVEL: NO PAIN (0)

## 2018-08-20 NOTE — NURSING NOTE
"Chief Complaint   Patient presents with     Headache     Chest Pain     occ., sharp pain in lower back,      Finger     lump on finger       Initial /84  Pulse 72  Temp 98.4  F (36.9  C) (Tympanic)  Resp 16  Wt 136 lb (61.7 kg)  BMI 25.7 kg/m2 Estimated body mass index is 25.7 kg/(m^2) as calculated from the following:    Height as of 4/13/18: 5' 1\" (1.549 m).    Weight as of this encounter: 136 lb (61.7 kg).      Health Maintenance that is potentially due pending provider review:  Colonoscopy/FIT    Gave pt phone number/pended order to schedule mammo and/or colonoscopy(or FIT)    Is there anyone who you would like to be able to receive your results? No  If yes have patient fill out [facility]      "

## 2018-08-20 NOTE — PROGRESS NOTES
"  SUBJECTIVE:   Jen Benedict is a 57 year old female who presents to clinic today for the following health issues:    Headaches      Duration: 3 months on and off    Description  Location: unilateral in the right occipital area   Character: \"like a migraine\"  Frequency:  2 times per week  Duration:  unknown    Intensity:  8/10    Accompanying signs and symptoms:    Precipitating or Alleviating factors:  Nausea/vomiting: no  Dizziness: no  Weakness or numbness: no  Visual changes: none  Fever: no   Sinus or URI symptoms no     History  Head trauma: no  Family history of migraines: unsure  Previous tests for headaches: no  Neurologist evaluations: no  Able to do daily activities when headache present: no  Wake with headaches: no  Daily pain medication use: YES- takes asprin for the headaches  Any changes in: none    Precipitating or Alleviating factors (light/sound/sleep/caffeine): takes a shower    Therapies tried and outcome: asprin    Outcome - usually effective  Frequent/daily pain medication use: no    Improves with shower  Comes out of the blue   When she gets this pain that area is also tender to the touch   She will take Aspirin   This pain can last hours          Chest Pain      Onset: 4 months    Description (location/character/radiation/duration): chest    Intensity:  moderate    Accompanying signs and symptoms:        Shortness of breath: no        Sweating: no        Nausea/vomitting: no        Palpitations: no        Other (fevers/chills/cough/heartburn/lightheadedness): no     History (similar episodes/previous evaluation): None    Precipitating or alleviating factors:       Worse with exertion: no        Worse with breathing: no        Related to eating: no        Better with burping: no     Therapies tried and outcome: stay calm, asprin    Will get dull pain across entire chest a couple times a week for the past 4 months. Does not seem to worsening with eating or activity. She will get it at rest " when in bed. I just do not feel right. It will last 5-10 minutes improves with rest, relaxation techniques and sometimes will take an aspirin. No personal cardiac history. Father had a stent placed in his 70.She is a non smoker. She does have diabetes and HTN    Lump on finger  Right hand middle finger      Pain in back -started yesterday  With lifting garbage can- not very heavy   Lower back across and into groin  She had similar pain the past as well     Problem list and histories reviewed & adjusted, as indicated.  Additional history: as documented    Patient Active Problem List   Diagnosis     Advanced directives, counseling/discussion     CARDIOVASCULAR SCREENING; LDL GOAL LESS THAN 160     Family history of rheumatoid arthritis     Accessory skin tags     Osteopenia     Type 2 diabetes mellitus without complication, without long-term current use of insulin (H)     Essential hypertension     Past Surgical History:   Procedure Laterality Date     HYSTERECTOMY TOTAL ABDOMINAL, BILATERAL SALPINGO-OOPHORECTOMY, COMBINED  2002    non-cancer related       Social History   Substance Use Topics     Smoking status: Never Smoker     Smokeless tobacco: Never Used     Alcohol use No     Family History   Problem Relation Age of Onset     Hypertension Mother      Diabetes Mother      Rheumatoid Arthritis Mother      Diabetes Sister      Type I           Reviewed and updated as needed this visit by clinical staff       Reviewed and updated as needed this visit by Provider         ROS:  Constitutional, HEENT, cardiovascular, pulmonary, gi and gu systems are negative, except as otherwise noted.    OBJECTIVE:                                                    /84  Pulse 72  Temp 98.4  F (36.9  C) (Tympanic)  Resp 16  Wt 136 lb (61.7 kg)  BMI 25.7 kg/m2  Body mass index is 25.7 kg/(m^2).  GENERAL APPEARANCE: healthy, alert and no distress  HENT: ear canals and TM's normal and nose and mouth without ulcers or  lesions  RESP: lungs clear to auscultation - no rales, rhonchi or wheezes  CV: regular rates and rhythm, normal S1 S2, no S3 or S4 and no murmur, click or rub  ABDOMEN: soft, nontender, without hepatosplenomegaly or masses and bowel sounds normal  MS: extremities normal- no gross deformities noted  SKIN: no suspicious lesions or rashes  PSYCH: mentation appears normal and affect normal/bright    Diagnostic test results:  Diagnostic Test Results:  pending  EKG - appears normal, NSR, normal axis, normal intervals, no acute ST/T changes c/w ischemia, unchanged from previous tracings     ASSESSMENT/PLAN:                                                    1. Chest pain, unspecified type  Atypical chest pain presentation   She however has multiple risk factors (DM II, HTN, hyperlipidemia and positive family cardiac hx)  Will get a stress test   Labs today   EKG was normal   To the EMERGENCY ROOM if symptoms much worse     - EKG 12-lead complete w/read - Clinics  - TSH with free T4 reflex  - CBC with platelets  - Basic metabolic panel  - Exercise Stress test; Future    2. Tension headache  Suspect tension headaches   Will start flexeril as needed  - cyclobenzaprine (FLEXERIL) 5 MG tablet; Take 1 tablet (5 mg) by mouth 3 times daily as needed for muscle spasms  Dispense: 42 tablet; Refill: 0    3. Type 2 diabetes mellitus without complication, without long-term current use of insulin (H)  - Hemoglobin A1c    4. Mixed hyperlipidemia      5. Essential hypertension    6. Screening for colon cancer  - Fecal colorectal cancer screen FIT - Future (S+30); Future    7. Strain of lumbar region, initial encounter  Suspect low back strain related to lifting   Flexeril as needed   Lumbar stretches provided   - cyclobenzaprine (FLEXERIL) 5 MG tablet; Take 1 tablet (5 mg) by mouth 3 times daily as needed for muscle spasms  Dispense: 42 tablet; Refill: 0      Patient Instructions     Suspect headache is tension headache   Recommend trying  flexeril as needed   Heat/ICE      For the chest pain   Recommend getting a stress test   IF severe chest pain to the EMERGENCY ROOM   Call [phone] to schedule stress test   See me back within a few days after the stress test for a recheck   Recommend daily baby aspirin         Suspect low back strain is due to muscle strain   Recommend try flexeril if faired  I have including stretches that can help prevent back strain        Warm pack the nodule on finger 20 minutes three times a day for 2 weeks   Follow up if worsening     Back Care Tips    Caring for your back  These are things you can do to prevent a recurrence of acute back pain and to reduce symptoms from chronic back pain:    Maintain a healthy weight. If you are overweight, losing weight will help most types of back pain.    Exercise is an important part of recovery from most types of back pain. The muscles behind and in front of the spine support the back. This means strengthening both the back muscles and the abdominal muscles will provide better support for your spine.     Swimming and brisk walking are good overall exercises to improve your fitness level.    Practice safe lifting methods (below).    Practice good posture when sitting, standing and walking. Avoid prolonged sitting. This puts more stress on the lower back than standing or walking.    Wear quality shoes with sufficient arch support. Foot and ankle alignment can affect back symptoms. Women should avoid wearing high heels.    Therapeutic massage can help relax the back muscles without stretching them.    During the first 24 to 72 hours after an acute injury or flare-up of chronic back pain, apply an ice pack to the painful area for 20 minutes and then remove it for 20 minutes, over a period of 60 to 90 minutes, or several times a day. As a safety precaution, do not use a heating pad at bedtime. Sleeping on a heating pad can lead to skin burns or tissue damage.    You can alternate ice  and heat therapies.  Medicines  Talk to your healthcare provider before using medicines, especially if you have other medical problems or are taking other medicines.    You may use acetaminophen or ibuprofen to control pain, unless your healthcare provider prescribed other pain medicine. If you have chronic conditions like diabetes, liver or kidney disease, stomach ulcers, or gastrointestinal bleeding, or are taking blood thinners, talk with your healthcare provider before taking any medicines.    Be careful if you are given prescription pain medicines, narcotics, or medicine for muscle spasm. They can cause drowsiness, affect your coordination, reflexes, and judgment. Do not drive or operate heavy machinery while taking these types of medicines. Take prescription pain medicine only as prescribed by your healthcare provider.  Lumbar stretch  Here is a simple stretching exercise that will help relax muscle spasm and keep your back more limber. If exercise makes your back pain worse, don t do it.    Lie on your back with your knees bent and both feet on the ground.    Slowly raise your left knee to your chest as you flatten your lower back against the floor. Hold for 5 seconds.    Relax and repeat the exercise with your right knee.    Do 10 of these exercises for each leg.  Safe lifting method    Don t bend over at the waist to lift an object off the floor.  Instead, bend your knees and hips in a squat.     Keep your back and head upright    Hold the object close to your body, directly in front of you.    Straighten your legs to lift the object.     Lower the object to the floor in the reverse fashion.    If you must slide something across the floor, push it.  Posture tips  Sitting  Sit in chairs with straight backs or low-back support. Keep your knees lower than your hips, with your feet flat on the floor.  When driving, sit up straight. Adjust the seat forward so you are not leaning toward the steering wheel.  A  small pillow or rolled towel behind your lower back may help if you are driving long distances.   Standing  When standing for long periods, shift most of your weight to one leg at a time. Alternate legs every few minutes.   Sleeping  The best way to sleep is on your side with your knees bent. Put a low pillow under your head to support your neck in a neutral spine position. Avoid thick pillows that bend your neck to one side. Put a pillow between your legs to further relax your lower back. If you sleep on your back, put pillows under your knees to support your legs in a slightly flexed position. Use a firm mattress. If your mattress sags, replace it, or use a 1/2-inch plywood board under the mattress to add support.  Follow-up care  Follow up with your healthcare provider, or as advised.  If X-rays, a CT scan or an MRI scan were taken, they will be reviewed by a radiologist. You will be notified of any new findings that may affect your care.  Call 911  Call 911 if any of the following occur:    Trouble breathing    Confusion    Very drowsy    Fainting or loss of consciousness    Rapid or very slow heart rate    Loss of  bowel or bladder control  When to seek medical advice  Call your healthcare provider right away if any of the following occur:    Pain becomes worse or spreads to your arms or legs    Weakness or numbness in one or both arms or legs    Numbness in the groin area  Date Last Reviewed: 6/1/2016 2000-2017 [employer]. [address] [place] [address]. All rights reserved. This information is not intended as a substitute for professional medical care. Always follow your healthcare professional's instructions.        Back Exercises: Hip Rotator Stretch    To start, lie on your back with your knees bent and feet flat on the floor. Don t press your neck or lower back to the floor. Breathe deeply. You should feel comfortable and relaxed in this position.    Rest your right ankle on  your left knee.    Place a towel behind your left thigh, and use it to pull the knee toward your chest. Feel the stretch in your buttocks.    Hold for 30 to 60 seconds. Release.    Repeat 2 times.    Switch legs.     If there is any pain other than stretch in the knee or buttock, stop and contact your healthcare provider.  For your safety, check with your healthcare provider before starting an exercise program.   Date Last Reviewed: 8/16/2015 2000-2017 [employer]. [address] [place] [address]. All rights reserved. This information is not intended as a substitute for professional medical care. Always follow your healthcare professional's instructions.        Back Exercises: Lower Back Stretch    To start, sit in a chair with your feet flat on the floor. Shift your weight slightly forward. Relax, and keep your ears, shoulders, and hips aligned while you do the following:    Sit with your feet well apart.    Bend forward and touch the floor with the backs of your hands. Relax and let your body drop.    Hold for 20 seconds. Return to starting position.    Repeat 2 times.   Date Last Reviewed: 11/1/2017 2000-2017 [employer]. [address] [place] [address]. All rights reserved. This information is not intended as a substitute for professional medical care. Always follow your healthcare professional's instructions.        Back Exercises: Side Stretch    To start, sit in a chair with your feet flat on the floor. Shift your weight slightly forward to avoid rounding your back. Relax. Keep your ears, shoulders, and hips aligned:    Stretch your right arm overhead.    Slowly bend to the left. Don t twist your torso. Stay within your pain limits.    Hold for 20 seconds. Return to starting position.    Repeat 2 to 5 times. Then, switch to the other side.  Date Last Reviewed: 10/13/2015    9678-8010 [employer]. [address] [place] [id number]. All  rights reserved. This information is not intended as a substitute for professional medical care. Always follow your healthcare professional's instructions.            Elin Arboleda NP  [facility]

## 2018-08-20 NOTE — PATIENT INSTRUCTIONS
Suspect headache is tension headache   Recommend trying flexeril as needed   Heat/ICE      For the chest pain   Recommend getting a stress test   IF severe chest pain to the EMERGENCY ROOM   Call [phone] to schedule stress test   See me back within a few days after the stress test for a recheck   Recommend daily baby aspirin         Suspect low back strain is due to muscle strain   Recommend try flexeril if faired  I have including stretches that can help prevent back strain        Warm pack the nodule on finger 20 minutes three times a day for 2 weeks   Follow up if worsening     Back Care Tips    Caring for your back  These are things you can do to prevent a recurrence of acute back pain and to reduce symptoms from chronic back pain:    Maintain a healthy weight. If you are overweight, losing weight will help most types of back pain.    Exercise is an important part of recovery from most types of back pain. The muscles behind and in front of the spine support the back. This means strengthening both the back muscles and the abdominal muscles will provide better support for your spine.     Swimming and brisk walking are good overall exercises to improve your fitness level.    Practice safe lifting methods (below).    Practice good posture when sitting, standing and walking. Avoid prolonged sitting. This puts more stress on the lower back than standing or walking.    Wear quality shoes with sufficient arch support. Foot and ankle alignment can affect back symptoms. Women should avoid wearing high heels.    Therapeutic massage can help relax the back muscles without stretching them.    During the first 24 to 72 hours after an acute injury or flare-up of chronic back pain, apply an ice pack to the painful area for 20 minutes and then remove it for 20 minutes, over a period of 60 to 90 minutes, or several times a day. As a safety precaution, do not use a heating pad at bedtime. Sleeping on a heating pad can lead to  skin burns or tissue damage.    You can alternate ice and heat therapies.  Medicines  Talk to your healthcare provider before using medicines, especially if you have other medical problems or are taking other medicines.    You may use acetaminophen or ibuprofen to control pain, unless your healthcare provider prescribed other pain medicine. If you have chronic conditions like diabetes, liver or kidney disease, stomach ulcers, or gastrointestinal bleeding, or are taking blood thinners, talk with your healthcare provider before taking any medicines.    Be careful if you are given prescription pain medicines, narcotics, or medicine for muscle spasm. They can cause drowsiness, affect your coordination, reflexes, and judgment. Do not drive or operate heavy machinery while taking these types of medicines. Take prescription pain medicine only as prescribed by your healthcare provider.  Lumbar stretch  Here is a simple stretching exercise that will help relax muscle spasm and keep your back more limber. If exercise makes your back pain worse, don t do it.    Lie on your back with your knees bent and both feet on the ground.    Slowly raise your left knee to your chest as you flatten your lower back against the floor. Hold for 5 seconds.    Relax and repeat the exercise with your right knee.    Do 10 of these exercises for each leg.  Safe lifting method    Don t bend over at the waist to lift an object off the floor.  Instead, bend your knees and hips in a squat.     Keep your back and head upright    Hold the object close to your body, directly in front of you.    Straighten your legs to lift the object.     Lower the object to the floor in the reverse fashion.    If you must slide something across the floor, push it.  Posture tips  Sitting  Sit in chairs with straight backs or low-back support. Keep your knees lower than your hips, with your feet flat on the floor.  When driving, sit up straight. Adjust the seat forward so  you are not leaning toward the steering wheel.  A small pillow or rolled towel behind your lower back may help if you are driving long distances.   Standing  When standing for long periods, shift most of your weight to one leg at a time. Alternate legs every few minutes.   Sleeping  The best way to sleep is on your side with your knees bent. Put a low pillow under your head to support your neck in a neutral spine position. Avoid thick pillows that bend your neck to one side. Put a pillow between your legs to further relax your lower back. If you sleep on your back, put pillows under your knees to support your legs in a slightly flexed position. Use a firm mattress. If your mattress sags, replace it, or use a 1/2-inch plywood board under the mattress to add support.  Follow-up care  Follow up with your healthcare provider, or as advised.  If X-rays, a CT scan or an MRI scan were taken, they will be reviewed by a radiologist. You will be notified of any new findings that may affect your care.  Call 911  Call 911 if any of the following occur:    Trouble breathing    Confusion    Very drowsy    Fainting or loss of consciousness    Rapid or very slow heart rate    Loss of  bowel or bladder control  When to seek medical advice  Call your healthcare provider right away if any of the following occur:    Pain becomes worse or spreads to your arms or legs    Weakness or numbness in one or both arms or legs    Numbness in the groin area  Date Last Reviewed: 6/1/2016 2000-2017 [employer]. [address] [place] [address]. All rights reserved. This information is not intended as a substitute for professional medical care. Always follow your healthcare professional's instructions.        Back Exercises: Hip Rotator Stretch    To start, lie on your back with your knees bent and feet flat on the floor. Don t press your neck or lower back to the floor. Breathe deeply. You should feel comfortable and  relaxed in this position.    Rest your right ankle on your left knee.    Place a towel behind your left thigh, and use it to pull the knee toward your chest. Feel the stretch in your buttocks.    Hold for 30 to 60 seconds. Release.    Repeat 2 times.    Switch legs.     If there is any pain other than stretch in the knee or buttock, stop and contact your healthcare provider.  For your safety, check with your healthcare provider before starting an exercise program.   Date Last Reviewed: 8/16/2015 2000-2017 [employer]. [address] [place] [address]. All rights reserved. This information is not intended as a substitute for professional medical care. Always follow your healthcare professional's instructions.        Back Exercises: Lower Back Stretch    To start, sit in a chair with your feet flat on the floor. Shift your weight slightly forward. Relax, and keep your ears, shoulders, and hips aligned while you do the following:    Sit with your feet well apart.    Bend forward and touch the floor with the backs of your hands. Relax and let your body drop.    Hold for 20 seconds. Return to starting position.    Repeat 2 times.   Date Last Reviewed: 11/1/2017 2000-2017 [employer]. [address] [place] [address]. All rights reserved. This information is not intended as a substitute for professional medical care. Always follow your healthcare professional's instructions.        Back Exercises: Side Stretch    To start, sit in a chair with your feet flat on the floor. Shift your weight slightly forward to avoid rounding your back. Relax. Keep your ears, shoulders, and hips aligned:    Stretch your right arm overhead.    Slowly bend to the left. Don t twist your torso. Stay within your pain limits.    Hold for 20 seconds. Return to starting position.    Repeat 2 to 5 times. Then, switch to the other side.  Date Last Reviewed: 10/13/2015    4291-5759 [employer]. [address] [place] [address], [place] [address]. All rights reserved. This information is not intended as a substitute for professional medical care. Always follow your healthcare professional's instructions.

## 2018-08-20 NOTE — MR AVS SNAPSHOT
After Visit Summary   8/20/2018    Jen Benedict    MRN: [id number]           Patient Information     Date Of Birth          1960        Visit Information        Provider Department      8/20/2018 8:20 AM Elin Arboleda NP [facility]        Today's Diagnoses     Chest pain, unspecified type    -  1    Tension headache        Type 2 diabetes mellitus without complication, without long-term current use of insulin (H)        Mixed hyperlipidemia        Essential hypertension        Screening for colon cancer        Strain of lumbar region, initial encounter          Care Instructions    Suspect headache is tension headache   Recommend trying flexeril as needed   Heat/ICE      For the chest pain   Recommend getting a stress test   IF severe chest pain to the EMERGENCY ROOM   Call [phone] to schedule stress test   See me back within a few days after the stress test for a recheck   Recommend daily baby aspirin         Suspect low back strain is due to muscle strain   Recommend try flexeril if faired  I have including stretches that can help prevent back strain        Warm pack the nodule on finger 20 minutes three times a day for 2 weeks   Follow up if worsening     Back Care Tips    Caring for your back  These are things you can do to prevent a recurrence of acute back pain and to reduce symptoms from chronic back pain:    Maintain a healthy weight. If you are overweight, losing weight will help most types of back pain.    Exercise is an important part of recovery from most types of back pain. The muscles behind and in front of the spine support the back. This means strengthening both the back muscles and the abdominal muscles will provide better support for your spine.     Swimming and brisk walking are good overall exercises to improve your fitness level.    Practice safe lifting methods (below).    Practice good posture when sitting, standing and walking. Avoid prolonged  sitting. This puts more stress on the lower back than standing or walking.    Wear quality shoes with sufficient arch support. Foot and ankle alignment can affect back symptoms. Women should avoid wearing high heels.    Therapeutic massage can help relax the back muscles without stretching them.    During the first 24 to 72 hours after an acute injury or flare-up of chronic back pain, apply an ice pack to the painful area for 20 minutes and then remove it for 20 minutes, over a period of 60 to 90 minutes, or several times a day. As a safety precaution, do not use a heating pad at bedtime. Sleeping on a heating pad can lead to skin burns or tissue damage.    You can alternate ice and heat therapies.  Medicines  Talk to your healthcare provider before using medicines, especially if you have other medical problems or are taking other medicines.    You may use acetaminophen or ibuprofen to control pain, unless your healthcare provider prescribed other pain medicine. If you have chronic conditions like diabetes, liver or kidney disease, stomach ulcers, or gastrointestinal bleeding, or are taking blood thinners, talk with your healthcare provider before taking any medicines.    Be careful if you are given prescription pain medicines, narcotics, or medicine for muscle spasm. They can cause drowsiness, affect your coordination, reflexes, and judgment. Do not drive or operate heavy machinery while taking these types of medicines. Take prescription pain medicine only as prescribed by your healthcare provider.  Lumbar stretch  Here is a simple stretching exercise that will help relax muscle spasm and keep your back more limber. If exercise makes your back pain worse, don t do it.    Lie on your back with your knees bent and both feet on the ground.    Slowly raise your left knee to your chest as you flatten your lower back against the floor. Hold for 5 seconds.    Relax and repeat the exercise with your right knee.    Do 10 of  these exercises for each leg.  Safe lifting method    Don t bend over at the waist to lift an object off the floor.  Instead, bend your knees and hips in a squat.     Keep your back and head upright    Hold the object close to your body, directly in front of you.    Straighten your legs to lift the object.     Lower the object to the floor in the reverse fashion.    If you must slide something across the floor, push it.  Posture tips  Sitting  Sit in chairs with straight backs or low-back support. Keep your knees lower than your hips, with your feet flat on the floor.  When driving, sit up straight. Adjust the seat forward so you are not leaning toward the steering wheel.  A small pillow or rolled towel behind your lower back may help if you are driving long distances.   Standing  When standing for long periods, shift most of your weight to one leg at a time. Alternate legs every few minutes.   Sleeping  The best way to sleep is on your side with your knees bent. Put a low pillow under your head to support your neck in a neutral spine position. Avoid thick pillows that bend your neck to one side. Put a pillow between your legs to further relax your lower back. If you sleep on your back, put pillows under your knees to support your legs in a slightly flexed position. Use a firm mattress. If your mattress sags, replace it, or use a 1/2-inch plywood board under the mattress to add support.  Follow-up care  Follow up with your healthcare provider, or as advised.  If X-rays, a CT scan or an MRI scan were taken, they will be reviewed by a radiologist. You will be notified of any new findings that may affect your care.  Call 911  Call 911 if any of the following occur:    Trouble breathing    Confusion    Very drowsy    Fainting or loss of consciousness    Rapid or very slow heart rate    Loss of  bowel or bladder control  When to seek medical advice  Call your healthcare provider right away if any of the following  occur:    Pain becomes worse or spreads to your arms or legs    Weakness or numbness in one or both arms or legs    Numbness in the groin area  Date Last Reviewed: 6/1/2016 2000-2017 [employer]. [address] [place] [address]. All rights reserved. This information is not intended as a substitute for professional medical care. Always follow your healthcare professional's instructions.        Back Exercises: Hip Rotator Stretch    To start, lie on your back with your knees bent and feet flat on the floor. Don t press your neck or lower back to the floor. Breathe deeply. You should feel comfortable and relaxed in this position.    Rest your right ankle on your left knee.    Place a towel behind your left thigh, and use it to pull the knee toward your chest. Feel the stretch in your buttocks.    Hold for 30 to 60 seconds. Release.    Repeat 2 times.    Switch legs.     If there is any pain other than stretch in the knee or buttock, stop and contact your healthcare provider.  For your safety, check with your healthcare provider before starting an exercise program.   Date Last Reviewed: 8/16/2015 2000-2017 [employer]. [address] [place] [address]. All rights reserved. This information is not intended as a substitute for professional medical care. Always follow your healthcare professional's instructions.        Back Exercises: Lower Back Stretch    To start, sit in a chair with your feet flat on the floor. Shift your weight slightly forward. Relax, and keep your ears, shoulders, and hips aligned while you do the following:    Sit with your feet well apart.    Bend forward and touch the floor with the backs of your hands. Relax and let your body drop.    Hold for 20 seconds. Return to starting position.    Repeat 2 times.   Date Last Reviewed: 11/1/2017 2000-2017 [employer]. [address] [place] [address]. All rights reserved. This  information is not intended as a substitute for professional medical care. Always follow your healthcare professional's instructions.        Back Exercises: Side Stretch    To start, sit in a chair with your feet flat on the floor. Shift your weight slightly forward to avoid rounding your back. Relax. Keep your ears, shoulders, and hips aligned:    Stretch your right arm overhead.    Slowly bend to the left. Don t twist your torso. Stay within your pain limits.    Hold for 20 seconds. Return to starting position.    Repeat 2 to 5 times. Then, switch to the other side.  Date Last Reviewed: 10/13/2015    [id number] [employer]. [address] [place] [address]. All rights reserved. This information is not intended as a substitute for professional medical care. Always follow your healthcare professional's instructions.                Follow-ups after your visit        Your next 10 appointments already scheduled     Aug 27, 2018  2:00 PM CDT   MA SCREENING DIGITAL BILATERAL with PIMA1   [facility]    [address] [place] [id number]   [phone]           Do not use any powder, lotion or deodorant under your arms or on your breast. If you do, we will ask you to remove it before your exam.  Wear comfortable, two-piece clothing.  If you have any allergies, tell your care team.  Bring any previous mammograms from other facilities or have them mailed to the breast center.              Future tests that were ordered for you today     Open Future Orders        Priority Expected Expires Ordered    Fecal colorectal cancer screen FIT - Future (S+30) Routine 9/10/2018 9/19/2018 8/20/2018    Exercise Stress test Routine  10/4/2018 8/20/2018            Who to contact     If you have questions or need follow up information about today's clinic visit or your schedule please contact [facility] directly at [phone].  Normal or  non-critical lab and imaging results will be communicated to you by [employer], letter or phone within 4 business days after the clinic has received the results. If you do not hear from us within 7 days, please contact the clinic through [employer]a or phone. If you have a critical or abnormal lab result, we will notify you by phone as soon as possible.  Submit refill requests through [employer]a or call your pharmacy and they will forward the refill request to us. Please allow 3 business days for your refill to be completed.          Additional Information About Your Visit        shopa Information     shopa gives you secure access to your electronic health record. If you see a primary care provider, you can also send messages to your care team and make appointments. If you have questions, please call your primary care clinic.  If you do not have a primary care provider, please call [phone] and they will assist you.        Care EveryWhere ID     This is your Care EveryWhere ID. This could be used by other organizations to access your [facility] medical records  [id number]        Your Vitals Were     Pulse Temperature Respirations BMI (Body Mass Index)          72 98.4  F (36.9  C) (Tympanic) 16 25.7 kg/m2         Blood Pressure from Last 3 Encounters:   08/20/18 138/84   04/13/18 138/80   01/26/18 130/80    Weight from Last 3 Encounters:   08/20/18 136 lb (61.7 kg)   04/13/18 140 lb (63.5 kg)   12/22/17 129 lb (58.5 kg)              We Performed the Following     Basic metabolic panel     CBC with platelets     EKG 12-lead complete w/read - Clinics     Hemoglobin A1c     TSH with free T4 reflex          Today's Medication Changes          These changes are accurate as of 8/20/18  9:06 AM.  If you have any questions, ask your nurse or doctor.               Start taking these medicines.        Dose/Directions    cyclobenzaprine 5 MG tablet   Commonly known as:  FLEXERIL   Used for:  Tension headache, Strain of lumbar  region, initial encounter   Started by:  Elin Arboleda NP        Dose:  5 mg   Take 1 tablet (5 mg) by mouth 3 times daily as needed for muscle spasms   Quantity:  42 tablet   Refills:  0            Where to get your medicines      These medications were sent to [id number] - [place], [place] - [address]   [address] , [place] [address]     Phone:  [phone]     cyclobenzaprine 5 MG tablet                Primary Care Provider Office Phone # Fax #    Elin Arboleda [phone]       [address]  [facility] [place] [address]        Equal Access to Services     [facility]: Hadii aad ku hadasho Soomaali, waaxda luqadaha, qaybta kaalmada adeegyada, waxay idiin hayaan adegary diaz . So [facility] [phone].    ATENCIÓN: Si habla español, tiene a calhoun disposición servicios gratuitos de asistencia lingüística. Llame al [phone].    We comply with applicable federal civil rights laws and [place] laws. We do not discriminate on the basis of race, color, national origin, age, disability, sex, sexual orientation, or gender identity.            Thank you!     Thank you for choosing [facility]  for your care. Our goal is always to provide you with excellent care. Hearing back from our patients is one way we can continue to improve our services. Please take a few minutes to complete the written survey that you may receive in the mail after your visit with us. Thank you!             Your Updated Medication List - Protect others around you: Learn how to safely use, store and throw away your medicines at www.disposemymeds.org.          This list is accurate as of 8/20/18  9:06 AM.  Always use your most recent med list.                   Brand Name Dispense Instructions for use Diagnosis    ACE/ARB/ARNI NOT PRESCRIBED (INTENTIONAL)      Please choose reason not prescribed, below    Type 2 diabetes mellitus without complication, without long-term current use of  insulin (H)       atorvastatin 10 MG tablet    LIPITOR    30 tablet    Take 1 tablet (10 mg) by mouth daily    Mixed hyperlipidemia       blood glucose lancets standard    no brand specified    100 each    Use to test blood sugar daily or as directed.    Type 2 diabetes mellitus without complication, without long-term current use of insulin (H)       blood glucose monitoring meter device kit    no brand specified    1 kit    Use to test blood sugar daily or as directed.    Type 2 diabetes mellitus without complication, without long-term current use of insulin (H)       blood glucose monitoring test strip    no brand specified    100 strip    Use to test blood sugars daily or as directed    Type 2 diabetes mellitus without complication, without long-term current use of insulin (H)       CALCIUM + D PO      Take 1,200 mg by mouth daily        cyclobenzaprine 5 MG tablet    FLEXERIL    42 tablet    Take 1 tablet (5 mg) by mouth 3 times daily as needed for muscle spasms    Tension headache, Strain of lumbar region, initial encounter       Multi-vitamin Tabs tablet      Take 1 tablet by mouth daily        omega 3 1000 MG Caps     90 capsule    Take 1 g by mouth 2 times daily    CARDIOVASCULAR SCREENING; LDL GOAL LESS THAN 160       tretinoin 0.05 % cream    RETIN-A    20 g    Spread a pea size amount into affected area topically at bedtime.  Use sunscreen SPF>20.    Dermatosis papulosa nigra

## 2018-08-21 PROCEDURE — 82274 ASSAY TEST FOR BLOOD FECAL: CPT | Performed by: NURSE PRACTITIONER

## 2018-08-24 ENCOUNTER — HOSPITAL ENCOUNTER (OUTPATIENT)
Dept: CARDIOLOGY | Facility: CLINIC | Age: 58
Discharge: HOME | End: 2018-08-24
Attending: NURSE PRACTITIONER | Admitting: NURSE PRACTITIONER
Payer: COMMERCIAL

## 2018-08-24 DIAGNOSIS — Z12.11 SCREENING FOR COLON CANCER: ICD-10-CM

## 2018-08-24 DIAGNOSIS — R07.9 CHEST PAIN, UNSPECIFIED TYPE: ICD-10-CM

## 2018-08-24 LAB — HEMOCCULT STL QL IA: NEGATIVE

## 2018-08-24 PROCEDURE — 93017 CV STRESS TEST TRACING ONLY: CPT

## 2018-08-24 PROCEDURE — 93018 CV STRESS TEST I&R ONLY: CPT | Performed by: INTERNAL MEDICINE

## 2018-08-24 PROCEDURE — 93016 CV STRESS TEST SUPVJ ONLY: CPT | Performed by: INTERNAL MEDICINE

## 2018-08-24 NOTE — PROGRESS NOTES
Patient performed a treadmill stress test with abnormal ECG changes. Was asymptomatic with the test. Test read by Dr. Lara who recommended imaging test be done in the future and that patient could go home. Clinic called regarding results, spoke to Jennifer. Report faxed to clinic who will f/u with patient.

## 2018-08-28 ENCOUNTER — OFFICE VISIT (OUTPATIENT)
Dept: CARDIOLOGY | Facility: CLINIC | Age: 58
End: 2018-08-28
Payer: COMMERCIAL

## 2018-08-28 VITALS
SYSTOLIC BLOOD PRESSURE: 140 MMHG | DIASTOLIC BLOOD PRESSURE: 77 MMHG | WEIGHT: 138 LBS | OXYGEN SATURATION: 98 % | HEART RATE: 74 BPM | BODY MASS INDEX: 26.07 KG/M2

## 2018-08-28 DIAGNOSIS — R94.39 ABNORMAL STRESS TEST: Primary | ICD-10-CM

## 2018-08-28 DIAGNOSIS — R07.9 CHEST PAIN, UNSPECIFIED TYPE: ICD-10-CM

## 2018-08-28 PROCEDURE — 99204 OFFICE O/P NEW MOD 45 MIN: CPT | Performed by: INTERNAL MEDICINE

## 2018-08-28 NOTE — LETTER
8/28/2018    lEin Arboleda, JINA  [address] [facility] [address]    RE: Jen Benedict       Dear Colleague,    I had the pleasure of seeing Jen Benedict in the [facility].      Cardiology Consultation     Assessment & Plan     1.  DM  2.  Abnormal ETT with ST segment depression, no imaging  3.  HTN    Recommendations:    1.  Patient has atypical symptoms however has vascular risk factors for significant obstructive coronary artery disease.  After lengthy discussion we have agreed for an exercise nuclear treadmill stress test.  2.  She again remains hesitant starting Lipitor or any other statin medications due to potentially perceived side effects.  She may be willing to consider them based on the results of the stress test.  We can readdress this upon her follow-up clinic visit  3.  She had adverse reactions to lisinopril and losartan in the past.  At this point she is not convinced of taking blood pressure medications however we can reintroduce this idea on her next visit.  We have given her information regarding a low-sodium and DASH diet.  4.  Return to clinic in 3-4 weeks with my JUNIOR colleagues to go over testing and plan for additional care..        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  Over the last several years she has been reporting of some chest discomfort.  She states that this comes randomly and is described as sharp in nature.  There is no associated symptoms in the and they typically last for a few seconds or minute and subside on their own.  It is not exertional or progressive.  It occurs a few times per week and patient has not paid much attention to this.  It never prevents her from doing activities or other duties from her work.  She had an exercise treadmill study with no imaging to work this up.  She exercised for 9 minutes.  There was ST segment depression noted downsloping and  horizontal.  She was entirely asymptomatic throughout this phase.  She had a normal heart rate and blood pressure response to exercise.  She also has comorbidities of diabetes mellitus which is controlled with diet therapy.  She has hypertension and hyperlipidemia.  She was told to take Lipitor every day basis.  However she had been only taking this once or twice per week and has now self discontinued.  When asked why she discontinued her Lipitor she states that she is worried about side effects.  I have asked her if she is experienced any side effects and she states no that she is only concerned about potentially developing them.  She presents to cardiology clinic for evaluation.        Primary Care Physician   Elin Arboleda      Patient Active Problem List   Diagnosis     Advanced directives, counseling/discussion     CARDIOVASCULAR SCREENING; LDL GOAL LESS THAN 160     Family history of rheumatoid arthritis     Accessory skin tags     Osteopenia     Type 2 diabetes mellitus without complication, without long-term current use of insulin (H)     Essential hypertension       Past Medical History   I have reviewed this patient's medical history and updated it with pertinent information if needed.   No past medical history on file.    Past Surgical History   I have reviewed this patient's surgical history and updated it with pertinent information if needed.  Past Surgical History:   Procedure Laterality Date     HYSTERECTOMY TOTAL ABDOMINAL, BILATERAL SALPINGO-OOPHORECTOMY, COMBINED  2002    non-cancer related       Prior to Admission Medications   Cannot display prior to admission medications because the patient has not been admitted in this contact.     [unfilled]  [unfilled]  Allergies   Allergies   Allergen Reactions     Lisinopril Cough     Losartan Cough     Pollen Extract        Social History    reports that she has never smoked. She has never used smokeless tobacco. She reports that she does not drink  alcohol or use illicit drugs.    Family History   Family History   Problem Relation Age of Onset     Hypertension Mother      Diabetes Mother      Rheumatoid Arthritis Mother      Diabetes Sister      Type I       Review of Systems   The comprehensive 10 point Review of Systems is negative other than noted in the HPI or here.     Physical Exam   Vital Signs with Ranges     Wt Readings from Last 4 Encounters:   08/20/18 61.7 kg (136 lb)   04/13/18 63.5 kg (140 lb)   12/22/17 58.5 kg (129 lb)   11/27/17 58.5 kg (129 lb)     [unfilled]      Vitals: Blood pressure 140/77, pulse 74, weight 62.6 kg (138 lb), SpO2 98 %, not currently breastfeeding.      Constitutional:   awake, alert, cooperative, no apparent distress, and appears stated age     ENT:   Normocephalic, without obvious abnormality, atraumatic, sinuses nontender on palpation, external ears without lesions, oral pharynx with moist mucous membranes, tonsils without erythema or exudates, gums normal and good dentition.     Neck:   Supple, symmetrical, trachea midline, no adenopathy, thyroid symmetric, not enlarged and no tenderness, skin normal     Cardiovascular:   Normal apical impulse, regular rate and rhythm, normal S1 and S2, no S3 or S4, and no murmur noted     Abdomen:   No scars, normal bowel sounds, soft, non-distended, non-tender, no masses palpated, no hepatosplenomegally     Musculoskeletal:   There is no redness, warmth, or swelling of the joints.  Full range of motion noted.  Motor strength is 5 out of 5 all extremities bilaterally.  Tone is normal.     Neurologic:   Awake, alert, oriented to name, place and time.  Cranial nerves II-XII are grossly intact.  Motor is 5 out of 5 bilaterally.  Cerebellar finger to nose, heel to shin intact.  Sensory is intact.  Babinski down going, Romberg negative, and gait is normal.        @LABRCNTIPR(tropi:5,troponinies:5)@    @LABRCNTIPR(wbc:3,hgb:3,mcv:3,plt:3,inr:3,na:3,potassium:3,chloride:3,co2:3,bun:3,cr:3,gfrestimated:3,gfrestblack:3,aniongap:3,india:3,glc:3,albumin:2,prottotal:2,bilitotal:2,alkphos:2,alt:2,ast:2,lipase:2,tropi:3)@  Recent Labs   Lab Test  18   0910  17   0825   04/21/15   0940   CHOL  245*  258*   < >  234*   HDL  60  79   < >  59   LDL  163*  158*   < >  153*   TRIG  112  103   < >  108   CHOLHDLRATIO   --    --    --   4.0    < > = values in this interval not displayed.     @LABRCNTIP(wbc:3,hgb:3,hct:3,mcv:3,plt:3,iron:3,ironsat:3,reticabsct:3,retp:3,feb:3,nestor:3,b12:3,folic:3,epoe:3,morph:3)@  @LABRCNTIP(PH:3,PHV:3,PO2:3,PO2V:3,sat:3,PCO2:3,PCO2V:3,HCO3:3,HCO3V:3)@  @LABRCNTIP(NTBNPI:3,NTBNP:3)@  @LABRCNTIP(DD:1)@  @LABRCNTIP(sed:3,crp:3)@  @LABRCNTIP(PLT:3)@  @LABRCNTIP(TSH:3)@  @LABRCNTIP(color:1,appearance:1,urineg,urinebili:1,urineketone:1,s,ubld:1,urineph:1,protein:1,urobilinogen:1,nitrite:1,leukest:1,rbcu:1,wbcu:1)@    Imaging:  No results found for this or any previous visit (from the past 48 hour(s)).    Echo:  No results found for this or any previous visit (from the past 4320 hour(s)).             Thank you for allowing me to participate in the care of your patient.      Sincerely,     Alfreda Lopez MD     [facility]    cc:   No referring provider defined for this encounter.

## 2018-08-28 NOTE — PATIENT INSTRUCTIONS
"[facility]  [facility]~[address]~[place], [place]~[address]  Thank you for your [facility] visit today. If you have questions regarding your visit, please contact your cardiology RN's, Inocencia Medina or Nena Rivera, at [phone]. Your provider has recommended the following:  Medication Changes:  None today. Continue taking your medications as you have been.  Recommendations:  Stress test next available prior to follow up with JUNIOR.  Follow-up:  See JUNIOR for cardiology follow up in  3 - 4 weeks.    To schedule a future appointment, we kindly ask that you call cardiology scheduling at [phone] three months prior to requested revisit date.  [facility] cardiology clinic is staffed with \"Advance Practice Providers\". These are our cardiology Physician Assistants and Nurse Practitioners. Please call cardiology scheduling if you feel you need clinical evaluation with them at any time for any cardiac reason.                 Reminder:  For your safety, we ask that you bring in your current medication(s) or an updated list of your medications with you to EACH office visit. Include the medication name, dose of pill on bottle and how you are taking it. Include over-the-counter medications or supplements. Your provider will review this at each visit and plan your care based on your current information.       "

## 2018-08-28 NOTE — MR AVS SNAPSHOT
"              After Visit Summary   8/28/2018    Jen Benedict    MRN: [id number]           Patient Information     Date Of Birth          1960        Visit Information        Provider Department      8/28/2018 10:30 AM Alfreda Lopez MD [facility]        Today's Diagnoses     Abnormal stress test    -  1    Chest pain          Care Instructions    [facility]~[address]~[place], [place]~[address]  Thank you for your  Heart Care visit today. If you have questions regarding your visit, please contact your cardiology RN's, Inocencia Medina or Nena Rivera, at [phone]. Your provider has recommended the following:  Medication Changes:  None today. Continue taking your medications as you have been.  Recommendations:  Stress test next available prior to follow up with JUNIOR.  Follow-up:  See JUNIOR for cardiology follow up in  3 - 4 weeks.    To schedule a future appointment, we kindly ask that you call cardiology scheduling at [phone] three months prior to requested revisit date.  [facility] cardiology clinic is staffed with \"Advance Practice Providers\". These are our cardiology Physician Assistants and Nurse Practitioners. Please call cardiology scheduling if you feel you need clinical evaluation with them at any time for any cardiac reason.                 Reminder:  For your safety, we ask that you bring in your current medication(s) or an updated list of your medications with you to EACH office visit. Include the medication name, dose of pill on bottle and how you are taking it. Include over-the-counter medications or supplements. Your provider will review this at each visit and plan your care based on your current information.               Follow-ups after your visit        Additional Services     Follow-Up with Cardiac Advanced Practice Provider                 Future tests that were " ordered for you today     Open Future Orders        Priority Expected Expires Ordered    Follow-Up with Cardiac Advanced Practice Provider Routine 9/18/2018 8/27/2020 8/28/2018    NM Exercise stress test (nuc card) Routine 9/27/2018 8/28/2019 8/28/2018            Who to contact     If you have questions or need follow up information about today's clinic visit or your schedule please contact [facility] directly at [phone].  Normal or non-critical lab and imaging results will be communicated to you by Avingerhart, letter or phone within 4 business days after the clinic has received the results. If you do not hear from us within 7 days, please contact the clinic through Sunrunt or phone. If you have a critical or abnormal lab result, we will notify you by phone as soon as possible.  Submit refill requests through wildcraft or call your pharmacy and they will forward the refill request to us. Please allow 3 business days for your refill to be completed.          Additional Information About Your Visit        wildcraft Information     wildcraft gives you secure access to your electronic health record. If you see a primary care provider, you can also send messages to your care team and make appointments. If you have questions, please call your primary care clinic.  If you do not have a primary care provider, please call [phone] and they will assist you.        Care EveryWhere ID     This is your Care EveryWhere ID. This could be used by other organizations to access your [place] medical records  [id number]        Your Vitals Were     Pulse Pulse Oximetry BMI (Body Mass Index)             74 98% 26.07 kg/m2          Blood Pressure from Last 3 Encounters:   08/28/18 140/77   08/20/18 138/84   04/13/18 138/80    Weight from Last 3 Encounters:   08/28/18 62.6 kg (138 lb)   08/20/18 61.7 kg (136 lb)   04/13/18 63.5 kg (140 lb)                 Today's Medication Changes          These  changes are accurate as of 8/28/18 10:41 AM.  If you have any questions, ask your nurse or doctor.               These medicines have changed or have updated prescriptions.        Dose/Directions    atorvastatin 10 MG tablet   Commonly known as:  LIPITOR   This may have changed:  additional instructions   Used for:  Mixed hyperlipidemia        Dose:  10 mg   Take 1 tablet (10 mg) by mouth daily   Quantity:  30 tablet   Refills:  0         Stop taking these medicines if you haven't already. Please contact your care team if you have questions.     cyclobenzaprine 5 MG tablet   Commonly known as:  FLEXERIL   Stopped by:  Alfreda Lopez MD                    Primary Care Provider Office Phone # Fax #    Elin Arboleda, [phone]       [address] [id number]        Equal Access to Services     KEYA GILBERT AH: Hadii joby ayala hadasho Soomaali, waaxda luqadaha, qaybta kaalmada adeegyada, jannet diaz . So [facility] [phone].    ATENCIÓN: Si habla español, tiene a calhoun disposición servicios gratuitos de asistencia lingüística. LlLake[facility] [phone].    We comply with applicable federal civil rights laws and [place] laws. We do not discriminate on the basis of race, color, national origin, age, disability, sex, sexual orientation, or gender identity.            Thank you!     Thank you for choosing [facility]  for your care. Our goal is always to provide you with excellent care. Hearing back from our patients is one way we can continue to improve our services. Please take a few minutes to complete the written survey that you may receive in the mail after your visit with us. Thank you!             Your Updated Medication List - Protect others around you: Learn how to safely use, store and throw away your medicines at www.disposemymeds.org.          This list is accurate as of 8/28/18 10:41 AM.  Always use your most  recent med list.                   Brand Name Dispense Instructions for use Diagnosis    ACE/ARB/ARNI NOT PRESCRIBED (INTENTIONAL)      Please choose reason not prescribed, below    Type 2 diabetes mellitus without complication, without long-term current use of insulin (H)       atorvastatin 10 MG tablet    LIPITOR    30 tablet    Take 1 tablet (10 mg) by mouth daily    Mixed hyperlipidemia       BENADRYL PO           blood glucose lancets standard    no brand specified    100 each    Use to test blood sugar daily or as directed.    Type 2 diabetes mellitus without complication, without long-term current use of insulin (H)       blood glucose monitoring meter device kit    no brand specified    1 kit    Use to test blood sugar daily or as directed.    Type 2 diabetes mellitus without complication, without long-term current use of insulin (H)       blood glucose monitoring test strip    no brand specified    100 strip    Use to test blood sugars daily or as directed    Type 2 diabetes mellitus without complication, without long-term current use of insulin (H)       CALCIUM + D PO      Take 1,200 mg by mouth daily        Multi-vitamin Tabs tablet      Take 1 tablet by mouth daily        omega 3 1000 MG Caps     90 capsule    Take 1 g by mouth 2 times daily    CARDIOVASCULAR SCREENING; LDL GOAL LESS THAN 160       tretinoin 0.05 % cream    RETIN-A    20 g    Spread a pea size amount into affected area topically at bedtime.  Use sunscreen SPF>20.    Dermatosis papulosa nigra

## 2018-08-28 NOTE — PROGRESS NOTES
Cardiology Consultation     Assessment & Plan     1.  DM  2.  Abnormal ETT with ST segment depression, no imaging  3.  HTN    Recommendations:    1.  Patient has atypical symptoms however has vascular risk factors for significant obstructive coronary artery disease.  After lengthy discussion we have agreed for an exercise nuclear treadmill stress test.  2.  She again remains hesitant starting Lipitor or any other statin medications due to potentially perceived side effects.  She may be willing to consider them based on the results of the stress test.  We can readdress this upon her follow-up clinic visit  3.  She had adverse reactions to lisinopril and losartan in the past.  At this point she is not convinced of taking blood pressure medications however we can reintroduce this idea on her next visit.  We have given her information regarding a low-sodium and DASH diet.  4.  Return to clinic in 3-4 weeks with my JUNIOR colleagues to go over testing and plan for additional care..        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  Over the last several years she has been reporting of some chest discomfort.  She states that this comes randomly and is described as sharp in nature.  There is no associated symptoms in the and they typically last for a few seconds or minute and subside on their own.  It is not exertional or progressive.  It occurs a few times per week and patient has not paid much attention to this.  It never prevents her from doing activities or other duties from her work.  She had an exercise treadmill study with no imaging to work this up.  She exercised for 9 minutes.  There was ST segment depression noted downsloping and horizontal.  She was entirely asymptomatic throughout this phase.  She had a normal heart rate and blood pressure response to exercise.  She also has comorbidities of diabetes mellitus which is controlled with diet therapy.   She has hypertension and hyperlipidemia.  She was told to take Lipitor every day basis.  However she had been only taking this once or twice per week and has now self discontinued.  When asked why she discontinued her Lipitor she states that she is worried about side effects.  I have asked her if she is experienced any side effects and she states no that she is only concerned about potentially developing them.  She presents to cardiology clinic for evaluation.        Primary Care Physician   Elin Arboleda      Patient Active Problem List   Diagnosis     Advanced directives, counseling/discussion     CARDIOVASCULAR SCREENING; LDL GOAL LESS THAN 160     Family history of rheumatoid arthritis     Accessory skin tags     Osteopenia     Type 2 diabetes mellitus without complication, without long-term current use of insulin (H)     Essential hypertension       Past Medical History   I have reviewed this patient's medical history and updated it with pertinent information if needed.   No past medical history on file.    Past Surgical History   I have reviewed this patient's surgical history and updated it with pertinent information if needed.  Past Surgical History:   Procedure Laterality Date     HYSTERECTOMY TOTAL ABDOMINAL, BILATERAL SALPINGO-OOPHORECTOMY, COMBINED  2002    non-cancer related       Prior to Admission Medications   Cannot display prior to admission medications because the patient has not been admitted in this contact.     [unfilled]  [unfilled]  Allergies   Allergies   Allergen Reactions     Lisinopril Cough     Losartan Cough     Pollen Extract        Social History    reports that she has never smoked. She has never used smokeless tobacco. She reports that she does not drink alcohol or use illicit drugs.    Family History   Family History   Problem Relation Age of Onset     Hypertension Mother      Diabetes Mother      Rheumatoid Arthritis Mother      Diabetes Sister      Type I       Review of  Systems   The comprehensive 10 point Review of Systems is negative other than noted in the HPI or here.     Physical Exam   Vital Signs with Ranges     Wt Readings from Last 4 Encounters:   08/20/18 61.7 kg (136 lb)   04/13/18 63.5 kg (140 lb)   12/22/17 58.5 kg (129 lb)   11/27/17 58.5 kg (129 lb)     [unfilled]      Vitals: Blood pressure 140/77, pulse 74, weight 62.6 kg (138 lb), SpO2 98 %, not currently breastfeeding.      Constitutional:   awake, alert, cooperative, no apparent distress, and appears stated age     ENT:   Normocephalic, without obvious abnormality, atraumatic, sinuses nontender on palpation, external ears without lesions, oral pharynx with moist mucous membranes, tonsils without erythema or exudates, gums normal and good dentition.     Neck:   Supple, symmetrical, trachea midline, no adenopathy, thyroid symmetric, not enlarged and no tenderness, skin normal     Cardiovascular:   Normal apical impulse, regular rate and rhythm, normal S1 and S2, no S3 or S4, and no murmur noted     Abdomen:   No scars, normal bowel sounds, soft, non-distended, non-tender, no masses palpated, no hepatosplenomegally     Musculoskeletal:   There is no redness, warmth, or swelling of the joints.  Full range of motion noted.  Motor strength is 5 out of 5 all extremities bilaterally.  Tone is normal.     Neurologic:   Awake, alert, oriented to name, place and time.  Cranial nerves II-XII are grossly intact.  Motor is 5 out of 5 bilaterally.  Cerebellar finger to nose, heel to shin intact.  Sensory is intact.  Babinski down going, Romberg negative, and gait is normal.       @LABRCNTIPR(tropi:5,troponinies:5)@    @LABRCNTIPR(wbc:3,hgb:3,mcv:3,plt:3,inr:3,na:3,potassium:3,chloride:3,co2:3,bun:3,cr:3,gfrestimated:3,gfrestblack:3,aniongap:3,india:3,glc:3,albumin:2,prottotal:2,bilitotal:2,alkphos:2,alt:2,ast:2,lipase:2,tropi:3)@  Recent Labs   Lab Test  04/20/18   0910  11/27/17   0825   04/21/15   0940   CHOL  245*  258*    < >  234*   HDL  60  79   < >  59   LDL  163*  158*   < >  153*   TRIG  112  103   < >  108   CHOLHDLRATIO   --    --    --   4.0    < > = values in this interval not displayed.     @LABRCNTIP(wbc:3,hgb:3,hct:3,mcv:3,plt:3,iron:3,ironsat:3,reticabsct:3,retp:3,feb:3,nestor:3,b12:3,folic:3,epoe:3,morph:3)@  @LABRCNTIP(PH:3,PHV:3,PO2:3,PO2V:3,sat:3,PCO2:3,PCO2V:3,HCO3:3,HCO3V:3)@  @LABRCNTIP(NTBNPI:3,NTBNP:3)@  @LABRCNTIP(DD:1)@  @LABRCNTIP(sed:3,crp:3)@  @LABRCNTIP(PLT:3)@  @LABRCNTIP(TSH:3)@  @LABRCNTIP(color:1,appearance:1,urineg,urinebili:1,urineketone:1,s,ubld:1,urineph:1,protein:1,urobilinogen:1,nitrite:1,leukest:1,rbcu:1,wbcu:1)@    Imaging:  No results found for this or any previous visit (from the past 48 hour(s)).    Echo:  No results found for this or any previous visit (from the past 4320 hour(s)).

## 2018-08-28 NOTE — LETTER
8/28/2018    Elin Arboleda, JINA  [address] [facility] [address]    RE: Jen Benedict       Dear Colleague,    I had the pleasure of seeing Jen Benedict in the [facility].      Cardiology Consultation     Assessment & Plan     1.  DM  2.  Abnormal ETT with ST segment depression, no imaging  3.  HTN    Recommendations:    1.  Patient has atypical symptoms however has vascular risk factors for significant obstructive coronary artery disease.  After lengthy discussion we have agreed for an exercise nuclear treadmill stress test.  2.  She again remains hesitant starting Lipitor or any other statin medications due to potentially perceived side effects.  She may be willing to consider them based on the results of the stress test.  We can readdress this upon her follow-up clinic visit  3.  She had adverse reactions to lisinopril and losartan in the past.  At this point she is not convinced of taking blood pressure medications however we can reintroduce this idea on her next visit.  We have given her information regarding a low-sodium and DASH diet.  4.  Return to clinic in 3-4 weeks with my JUNIOR colleagues to go over testing and plan for additional care..        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  Over the last several years she has been reporting of some chest discomfort.  She states that this comes randomly and is described as sharp in nature.  There is no associated symptoms in the and they typically last for a few seconds or minute and subside on their own.  It is not exertional or progressive.  It occurs a few times per week and patient has not paid much attention to this.  It never prevents her from doing activities or other duties from her work.  She had an exercise treadmill study with no imaging to work this up.  She exercised for 9 minutes.  There was ST segment depression noted downsloping and  horizontal.  She was entirely asymptomatic throughout this phase.  She had a normal heart rate and blood pressure response to exercise.  She also has comorbidities of diabetes mellitus which is controlled with diet therapy.  She has hypertension and hyperlipidemia.  She was told to take Lipitor every day basis.  However she had been only taking this once or twice per week and has now self discontinued.  When asked why she discontinued her Lipitor she states that she is worried about side effects.  I have asked her if she is experienced any side effects and she states no that she is only concerned about potentially developing them.  She presents to cardiology clinic for evaluation.        Primary Care Physician   Elin Arboleda      Patient Active Problem List   Diagnosis     Advanced directives, counseling/discussion     CARDIOVASCULAR SCREENING; LDL GOAL LESS THAN 160     Family history of rheumatoid arthritis     Accessory skin tags     Osteopenia     Type 2 diabetes mellitus without complication, without long-term current use of insulin (H)     Essential hypertension       Past Medical History   I have reviewed this patient's medical history and updated it with pertinent information if needed.   No past medical history on file.    Past Surgical History   I have reviewed this patient's surgical history and updated it with pertinent information if needed.  Past Surgical History:   Procedure Laterality Date     HYSTERECTOMY TOTAL ABDOMINAL, BILATERAL SALPINGO-OOPHORECTOMY, COMBINED  2002    non-cancer related       Prior to Admission Medications   Cannot display prior to admission medications because the patient has not been admitted in this contact.     [unfilled]  [unfilled]  Allergies   Allergies   Allergen Reactions     Lisinopril Cough     Losartan Cough     Pollen Extract        Social History    reports that she has never smoked. She has never used smokeless tobacco. She reports that she does not drink  alcohol or use illicit drugs.    Family History   Family History   Problem Relation Age of Onset     Hypertension Mother      Diabetes Mother      Rheumatoid Arthritis Mother      Diabetes Sister      Type I       Review of Systems   The comprehensive 10 point Review of Systems is negative other than noted in the HPI or here.     Physical Exam   Vital Signs with Ranges     Wt Readings from Last 4 Encounters:   08/20/18 61.7 kg (136 lb)   04/13/18 63.5 kg (140 lb)   12/22/17 58.5 kg (129 lb)   11/27/17 58.5 kg (129 lb)     [unfilled]      Vitals: Blood pressure 140/77, pulse 74, weight 62.6 kg (138 lb), SpO2 98 %, not currently breastfeeding.      Constitutional:   awake, alert, cooperative, no apparent distress, and appears stated age     ENT:   Normocephalic, without obvious abnormality, atraumatic, sinuses nontender on palpation, external ears without lesions, oral pharynx with moist mucous membranes, tonsils without erythema or exudates, gums normal and good dentition.     Neck:   Supple, symmetrical, trachea midline, no adenopathy, thyroid symmetric, not enlarged and no tenderness, skin normal     Cardiovascular:   Normal apical impulse, regular rate and rhythm, normal S1 and S2, no S3 or S4, and no murmur noted     Abdomen:   No scars, normal bowel sounds, soft, non-distended, non-tender, no masses palpated, no hepatosplenomegally     Musculoskeletal:   There is no redness, warmth, or swelling of the joints.  Full range of motion noted.  Motor strength is 5 out of 5 all extremities bilaterally.  Tone is normal.     Neurologic:   Awake, alert, oriented to name, place and time.  Cranial nerves II-XII are grossly intact.  Motor is 5 out of 5 bilaterally.  Cerebellar finger to nose, heel to shin intact.  Sensory is intact.  Babinski down going, Romberg negative, and gait is normal.        @LABRCNTIPR(tropi:5,troponinies:5)@    @LABRCNTIPR(wbc:3,hgb:3,mcv:3,plt:3,inr:3,na:3,potassium:3,chloride:3,co2:3,bun:3,cr:3,gfrestimated:3,gfrestblack:3,aniongap:3,india:3,glc:3,albumin:2,prottotal:2,bilitotal:2,alkphos:2,alt:2,ast:2,lipase:2,tropi:3)@  Recent Labs   Lab Test  18   0910  17   0825   04/21/15   0940   CHOL  245*  258*   < >  234*   HDL  60  79   < >  59   LDL  163*  158*   < >  153*   TRIG  112  103   < >  108   CHOLHDLRATIO   --    --    --   4.0    < > = values in this interval not displayed.     @LABRCNTIP(wbc:3,hgb:3,hct:3,mcv:3,plt:3,iron:3,ironsat:3,reticabsct:3,retp:3,feb:3,nestor:3,b12:3,folic:3,epoe:3,morph:3)@  @LABRCNTIP(PH:3,PHV:3,PO2:3,PO2V:3,sat:3,PCO2:3,PCO2V:3,HCO3:3,HCO3V:3)@  @LABRCNTIP(NTBNPI:3,NTBNP:3)@  @LABRCNTIP(DD:1)@  @LABRCNTIP(sed:3,crp:3)@  @LABRCNTIP(PLT:3)@  @LABRCNTIP(TSH:3)@  @LABRCNTIP(color:1,appearance:1,urineg,urinebili:1,urineketone:1,s,ubld:1,urineph:1,protein:1,urobilinogen:1,nitrite:1,leukest:1,rbcu:1,wbcu:1)@    Imaging:  No results found for this or any previous visit (from the past 48 hour(s)).    Echo:  No results found for this or any previous visit (from the past 4320 hour(s)).             Thank you for allowing me to participate in the care of your patient.    Sincerely,     Alfreda Lopez MD     [facility]

## 2018-09-14 ENCOUNTER — HOSPITAL ENCOUNTER (OUTPATIENT)
Dept: NUCLEAR MEDICINE | Facility: CLINIC | Age: 58
Setting detail: NUCLEAR MEDICINE
Discharge: HOME | End: 2018-09-14
Attending: INTERNAL MEDICINE | Admitting: INTERNAL MEDICINE
Payer: COMMERCIAL

## 2018-09-14 DIAGNOSIS — R94.39 ABNORMAL STRESS TEST: ICD-10-CM

## 2018-09-14 PROCEDURE — 93018 CV STRESS TEST I&R ONLY: CPT | Performed by: INTERNAL MEDICINE

## 2018-09-14 PROCEDURE — 93017 CV STRESS TEST TRACING ONLY: CPT

## 2018-09-14 PROCEDURE — 93016 CV STRESS TEST SUPVJ ONLY: CPT | Performed by: INTERNAL MEDICINE

## 2018-09-14 PROCEDURE — 78452 HT MUSCLE IMAGE SPECT MULT: CPT | Mod: 26 | Performed by: INTERNAL MEDICINE

## 2018-09-14 PROCEDURE — 34300033 ZZH RX 343: Performed by: INTERNAL MEDICINE

## 2018-09-14 PROCEDURE — 78452 HT MUSCLE IMAGE SPECT MULT: CPT

## 2018-09-14 PROCEDURE — A9502 TC99M TETROFOSMIN: HCPCS | Performed by: INTERNAL MEDICINE

## 2018-09-14 RX ADMIN — TETROFOSMIN 32.6 MCI.: 1.38 INJECTION, POWDER, LYOPHILIZED, FOR SOLUTION INTRAVENOUS at 12:20

## 2018-09-14 RX ADMIN — TETROFOSMIN 10.9 MCI.: 1.38 INJECTION, POWDER, LYOPHILIZED, FOR SOLUTION INTRAVENOUS at 10:50

## 2018-09-18 ENCOUNTER — OFFICE VISIT (OUTPATIENT)
Dept: CARDIOLOGY | Facility: CLINIC | Age: 58
End: 2018-09-18
Attending: INTERNAL MEDICINE
Payer: COMMERCIAL

## 2018-09-18 VITALS
BODY MASS INDEX: 26.07 KG/M2 | HEART RATE: 74 BPM | WEIGHT: 138 LBS | SYSTOLIC BLOOD PRESSURE: 134 MMHG | OXYGEN SATURATION: 96 % | DIASTOLIC BLOOD PRESSURE: 74 MMHG

## 2018-09-18 DIAGNOSIS — E78.2 MIXED HYPERLIPIDEMIA: ICD-10-CM

## 2018-09-18 DIAGNOSIS — R94.39 ABNORMAL STRESS TEST: Primary | ICD-10-CM

## 2018-09-18 DIAGNOSIS — R07.9 CHEST PAIN, UNSPECIFIED TYPE: ICD-10-CM

## 2018-09-18 DIAGNOSIS — I10 BENIGN ESSENTIAL HYPERTENSION: ICD-10-CM

## 2018-09-18 PROCEDURE — 99214 OFFICE O/P EST MOD 30 MIN: CPT | Performed by: PHYSICIAN ASSISTANT

## 2018-09-18 RX ORDER — ATORVASTATIN CALCIUM 10 MG/1
10 TABLET, FILM COATED ORAL DAILY
Qty: 90 TABLET | Refills: 3 | Status: SHIPPED | OUTPATIENT
Start: 2018-09-18 | End: 2019-03-06

## 2018-09-18 NOTE — PATIENT INSTRUCTIONS
"[facility] HEART CARE  [facility]~[address]~[place], [place]~[address]  Thank you for your [facility] visit today. If you have questions regarding your visit, please contact your cardiology RN's, Inocencia Medina or Nena Rivera, at [phone]. Your provider has recommended the following:  Medication Changes:  I would consider adding a blood pressure medication  Try taking the statin a a regular basis  Recommendations:  No further cardiac testing needed at this time  Follow-up:  As needed    To schedule a future appointment, we kindly ask that you call cardiology scheduling at [phone] three months prior to requested revisit date.  [facility] cardiology clinic is staffed with \"Advance Practice Providers\". These are our cardiology Physician Assistants and Nurse Practitioners. Please call cardiology scheduling if you feel you need clinical evaluation with them at any time for any cardiac reason.                 Reminder:  For your safety, we ask that you bring in your current medication(s) or an updated list of your medications with you to EACH office visit. Include the medication name, dose of pill on bottle and how you are taking it. Include over-the-counter medications or supplements. Your provider will review this at each visit and plan your care based on your current information.       "

## 2018-09-18 NOTE — MR AVS SNAPSHOT
"              After Visit Summary   9/18/2018    Jen Benedict    MRN: [id number]           Patient Information     Date Of Birth          1960        Visit Information        Provider Department      9/18/2018 11:00 AM Tayla Nguyen PA-C [facility]   [place]        Today's Diagnoses     Abnormal stress test        Chest pain, unspecified type          Care Instructions    [facility]~[address]~[place], [place]~[address]  Thank you for your  Heart Care visit today. If you have questions regarding your visit, please contact your cardiology RN's, Inocencia Medina or Nena Rivera, at [phone]. Your provider has recommended the following:  Medication Changes:  I would consider adding a blood pressure medication  Try taking the statin a a regular basis  Recommendations:  No further cardiac testing needed at this time  Follow-up:  As needed    To schedule a future appointment, we kindly ask that you call cardiology scheduling at [phone] three months prior to requested revisit date.  [facility] cardiology clinic is staffed with \"Advance Practice Providers\". These are our cardiology Physician Assistants and Nurse Practitioners. Please call cardiology scheduling if you feel you need clinical evaluation with them at any time for any cardiac reason.                 Reminder:  For your safety, we ask that you bring in your current medication(s) or an updated list of your medications with you to EACH office visit. Include the medication name, dose of pill on bottle and how you are taking it. Include over-the-counter medications or supplements. Your provider will review this at each visit and plan your care based on your current information.               Follow-ups after your visit        Your next 10 appointments already scheduled     Sep 24, 2018  3:15 PM CDT   [facility] SCREENING DIGITAL BILATERAL with PIMA1 "   [facility]    [address] [facility] [address]   [phone]           Do not use any powder, lotion or deodorant under your arms or on your breast. If you do, we will ask you to remove it before your exam.  Wear comfortable, two-piece clothing.  If you have any allergies, tell your care team.  Bring any previous mammograms from other facilities or have them mailed to the breast center.              Who to contact     If you have questions or need follow up information about today's clinic visit or your schedule please contact [facility] directly at [phone].  Normal or non-critical lab and imaging results will be communicated to you by MyChart, letter or phone within 4 business days after the clinic has received the results. If you do not hear from us within 7 days, please contact the clinic through [employer] or phone. If you have a critical or abnormal lab result, we will notify you by phone as soon as possible.  Submit refill requests through [employer] or call your pharmacy and they will forward the refill request to us. Please allow 3 business days for your refill to be completed.          Additional Information About Your Visit        MyChart Information     [employer] gives you secure access to your electronic health record. If you see a primary care provider, you can also send messages to your care team and make appointments. If you have questions, please call your primary care clinic.  If you do not have a primary care provider, please call [phone] and they will assist you.        Care EveryWhere ID     This is your Care EveryWhere ID. This could be used by other organizations to access your [facility] medical records  [id number]        Your Vitals Were     Pulse Pulse Oximetry BMI (Body Mass Index)             74 96% 26.07 kg/m2          Blood Pressure from Last 3 Encounters:   09/18/18 134/74   08/28/18  140/77   08/20/18 138/84    Weight from Last 3 Encounters:   09/18/18 62.6 kg (138 lb)   08/28/18 62.6 kg (138 lb)   08/20/18 61.7 kg (136 lb)              We Performed the Following     Follow-Up with Cardiac Advanced Practice Provider          Today's Medication Changes          These changes are accurate as of 9/18/18 11:13 AM.  If you have any questions, ask your nurse or doctor.               These medicines have changed or have updated prescriptions.        Dose/Directions    atorvastatin 10 MG tablet   Commonly known as:  LIPITOR   This may have changed:  additional instructions   Used for:  Mixed hyperlipidemia        Dose:  10 mg   Take 1 tablet (10 mg) by mouth daily   Quantity:  30 tablet   Refills:  0                Primary Care Provider Office Phone # Fax #    Elin JINA Arboleda [phone]       [address] [facility] [address]        Equal Access to Services     KYEA GILBERT : Hadflory Adhikari, waaxalicia mason, qaybenedelia kaalmaalicia jara, jannet diaz . So [facility] [phone].    ATENCIÓN: Si habla español, tiene a calhoun disposición servicios gratuitos de asistencia lingüística. Rj al [phone].    We comply with applicable federal civil rights laws and [place] laws. We do not discriminate on the basis of race, color, national origin, age, disability, sex, sexual orientation, or gender identity.            Thank you!     Thank you for choosing [facility]  for your care. Our goal is always to provide you with excellent care. Hearing back from our patients is one way we can continue to improve our services. Please take a few minutes to complete the written survey that you may receive in the mail after your visit with us. Thank you!             Your Updated Medication List - Protect others around you: Learn how to safely use, store and throw away your medicines at www.disposemymeds.org.          This  list is accurate as of 9/18/18 11:13 AM.  Always use your most recent med list.                   Brand Name Dispense Instructions for use Diagnosis    ACE/ARB/ARNI NOT PRESCRIBED (INTENTIONAL)      Please choose reason not prescribed, below    Type 2 diabetes mellitus without complication, without long-term current use of insulin (H)       atorvastatin 10 MG tablet    LIPITOR    30 tablet    Take 1 tablet (10 mg) by mouth daily    Mixed hyperlipidemia       BENADRYL PO           blood glucose lancets standard    no brand specified    100 each    Use to test blood sugar daily or as directed.    Type 2 diabetes mellitus without complication, without long-term current use of insulin (H)       blood glucose monitoring meter device kit    no brand specified    1 kit    Use to test blood sugar daily or as directed.    Type 2 diabetes mellitus without complication, without long-term current use of insulin (H)       blood glucose monitoring test strip    no brand specified    100 strip    Use to test blood sugars daily or as directed    Type 2 diabetes mellitus without complication, without long-term current use of insulin (H)       CALCIUM + D PO      Take 1,200 mg by mouth daily        Multi-vitamin Tabs tablet      Take 1 tablet by mouth daily        omega 3 1000 MG Caps     90 capsule    Take 1 g by mouth 2 times daily    CARDIOVASCULAR SCREENING; LDL GOAL LESS THAN 160       tretinoin 0.05 % cream    RETIN-A    20 g    Spread a pea size amount into affected area topically at bedtime.  Use sunscreen SPF>20.    Dermatosis papulosa nigra

## 2018-09-18 NOTE — LETTER
9/18/2018    Elin Arboleda NP  [address] [facility] [address]    RE: Jen Benedict       Dear Colleague,    I had the pleasure of seeing Jen Benedict in the [facility].    Please see separate dictation for HPI, PHYSICAL EXAM AND IMPRESSION/PLAN.    CURRENT MEDICATIONS:  Current Outpatient Prescriptions   Medication Sig Dispense Refill     ACE/ARB NOT PRESCRIBED, INTENTIONAL, Please choose reason not prescribed, below       atorvastatin (LIPITOR) 10 MG tablet Take 1 tablet (10 mg) by mouth daily (Patient taking differently: Take 10 mg by mouth daily Patient takes 1 to 2 times a week) 30 tablet 0     blood glucose (NO BRAND SPECIFIED) lancets standard Use to test blood sugar daily or as directed. 100 each 1     blood glucose monitoring (NO BRAND SPECIFIED) meter device kit Use to test blood sugar daily or as directed. 1 kit 0     blood glucose monitoring (NO BRAND SPECIFIED) test strip Use to test blood sugars daily or as directed 100 strip 3     Calcium Carbonate-Vitamin D (CALCIUM + D PO) Take 1,200 mg by mouth daily        DiphenhydrAMINE HCl (BENADRYL PO)        multivitamin, therapeutic with minerals (MULTI-VITAMIN) TABS tablet Take 1 tablet by mouth daily       omega 3 1000 MG CAPS Take 1 g by mouth 2 times daily 90 capsule      tretinoin (RETIN-A) 0.05 % cream Spread a pea size amount into affected area topically at bedtime.  Use sunscreen SPF>20. (Patient not taking: Reported on 9/18/2018) 20 g 11       ALLERGIES:     Allergies   Allergen Reactions     Lisinopril Cough     Losartan Cough     Pollen Extract        PAST MEDICAL HISTORY:  No past medical history on file.    PAST SURGICAL HISTORY:  Past Surgical History:   Procedure Laterality Date     HYSTERECTOMY TOTAL ABDOMINAL, BILATERAL SALPINGO-OOPHORECTOMY, COMBINED  2002    non-cancer related       SOCIAL HISTORY:  Social History     Social History     Marital status:      Spouse name: N/A     Number  of children: N/A     Years of education: N/A     Social History Main Topics     Smoking status: Never Smoker     Smokeless tobacco: Never Used     Alcohol use No     Drug use: No     Sexual activity: No     Other Topics Concern     None     Social History Narrative       FAMILY HISTORY:  Family History   Problem Relation Age of Onset     Hypertension Mother      Diabetes Mother      Rheumatoid Arthritis Mother      Diabetes Sister      Type I       Review of Systems:  Skin:  Negative       Eyes:  Positive for glasses    ENT:  Negative      Respiratory:  Negative for shortness of breath;cough     Cardiovascular:  edema;Negative for;syncope or near-syncope;lightheadedness;dizziness;palpitations;fatigue chest pain;Positive for    Gastroenterology: Positive for heartburn;nausea;vomiting    Genitourinary:  Negative      Musculoskeletal:  Negative      Neurologic:  Positive for headaches;numbness or tingling of hands;numbness or tingling of feet;memory problems    Psychiatric:  Positive for anxiety;depression    Heme/Lymph/Imm:  Negative for bleeding disorder    Endocrine:  Positive for diabetes       Reviewed. Remainder of the note dictated.    Tayla Nguyen PA-C        Service Date: 09/18/2018      HISTORY OF PRESENT ILLNESS:  Ms. Benedict is a pleasant 57-year-old female who presents to the Cardiology office today to review the results of recent nuclear stress test.      The patient does not have any history of known coronary artery disease, but does have cardiac risk factors of diabetes, hypertension and dyslipidemia.  The patient recently discussed some episodes of atypical chest discomfort with her primary care provider and an exercise treadmill study without any imaging was ordered.  She was able to exercise for 9 minutes.  The EKG did show some ST segment depression noted in a downsloping and horizontal pattern.  She did not have any symptoms with the test.  Due to this, she was sent to the Cardiology  office.  She underwent consultation with Dr. Lopez and it was recommended that she undergo a nuclear exercise stress test.      At this time, the patient denies any new symptoms or change in her symptomatology.  She does have a prescription for atorvastatin at home which she is supposed to take on a daily basis, but states that sometimes she forgets to take the medication.  She has been worried about potential side effects from the medication, but denies that she has ever experienced any side effects from statin therapy.      CURRENT CARDIAC MEDICATIONS:  Atorvastatin 10 mg again prescribed daily, however, she is not taking it daily.      The remainder of her medications, allergies and review of systems were reviewed and as are documented separately.      PHYSICAL EXAMINATION:   GENERAL:  The patient is a 57-year-old female who appears her stated age.  She is in no apparent distress.   VITAL SIGNS:  Her blood pressure is 134/74, pulse is 74, weight is 138 pounds which is stable.     LUNGS:  Breathing is nonlabored.  Lungs are clear to auscultation.   CARDIAC:  Reveals a regular rate and rhythm.   NEUROLOGIC:  Alert and oriented.      Her recent exercise nuclear stress test results were reviewed.  She was able to exercise for 11 minutes using the Saleem protocol.  She achieved a workload of 12.4 METS.  She did achieve a heart rate of 93% of the maximum predicted.  Myocardial perfusion imaging showed no evidence for ischemia.  Gated images showed normal wall motion and normal EF.  The stress EKG did demonstrate 1 mm of inferolateral ST depression at peak exercise.  Her peak blood pressure was 194/70.      ASSESSMENT AND PLAN:  Ms. Benedict is a pleasant 57-year-old female with a history of atypical chest discomfort and cardiac risk factors of hypertension, dyslipidemia and type 2 diabetes.  She also has a family history of coronary artery disease.  Fortunately, her recent nuclear stress test does not show any perfusion  abnormalities.  At this point, I do not feel that she needs any further cardiac workup.  However, I did highly recommend that she ensure adequate control of her cardiac risk factors.  Her blood pressure continues to be a bit elevated at today's office visit and she had quite a significant rise in blood pressure with activity.  I did recommend she consider antihypertensive therapy.  It sounds like she has had some trouble with ACE inhibitors and ARBs in the past.  I did consider starting her on amlodipine, but she declined at this time and would like to discuss this further with her primary care provider, which is reasonable.  Again, she does have atorvastatin and I encouraged her to take this on a daily basis.  She states that she is going to try to remember to take this more regularly.  We also discussed a heart healthy diet and routine exercise program.      At this point, she will follow up with her primary care provider on a routine basis and follow up in the Cardiology office on an as needed basis.      Thank you for allowing us to participate in the care of this very pleasant patient.         TAYLA WHITTINGTON PA-C             D: 2018   T: 2018   MT: ROSSANA      Name:     JUAN FLYNN   MRN:      [id number]        Account:      [id number]   :      1960           Service Date: 2018      Document: [id number]       Thank you for allowing me to participate in the care of your patient.      Sincerely,     Tayla Whittington PA-C     [facility]    cc:   Alfreda Lopez MD  [address]  [place] [address]

## 2018-09-18 NOTE — LETTER
9/18/2018      Elin Arboleda NP  [address] [facility] [facility] [address]      RE: Jen Benedict       Dear Colleague,    I had the pleasure of seeing Jen Benedict in the [facility].    Service Date: 09/18/2018      HISTORY OF PRESENT ILLNESS:  Ms. Benedict is a pleasant 57-year-old female who presents to the Cardiology office today to review the results of recent nuclear stress test.      The patient does not have any history of known coronary artery disease, but does have cardiac risk factors of diabetes, hypertension and dyslipidemia.  The patient recently discussed some episodes of atypical chest discomfort with her primary care provider and an exercise treadmill study without any imaging was ordered.  She was able to exercise for 9 minutes.  The EKG did show some ST segment depression noted in a downsloping and horizontal pattern.  She did not have any symptoms with the test.  Due to this, she was sent to the Cardiology office.  She underwent consultation with Dr. Lopez and it was recommended that she undergo a nuclear exercise stress test.      At this time, the patient denies any new symptoms or change in her symptomatology.  She does have a prescription for atorvastatin at home which she is supposed to take on a daily basis, but states that sometimes she forgets to take the medication.  She has been worried about potential side effects from the medication, but denies that she has ever experienced any side effects from statin therapy.      CURRENT CARDIAC MEDICATIONS:  Atorvastatin 10 mg again prescribed daily, however, she is not taking it daily.      The remainder of her medications, allergies and review of systems were reviewed and as are documented separately.      PHYSICAL EXAMINATION:   GENERAL:  The patient is a 57-year-old female who appears her stated age.  She is in no apparent distress.   VITAL SIGNS:  Her blood pressure is 134/74, pulse is 74, weight is 138  pounds which is stable.     LUNGS:  Breathing is nonlabored.  Lungs are clear to auscultation.   CARDIAC:  Reveals a regular rate and rhythm.   NEUROLOGIC:  Alert and oriented.      Her recent exercise nuclear stress test results were reviewed.  She was able to exercise for 11 minutes using the Saleem protocol.  She achieved a workload of 12.4 METS.  She did achieve a heart rate of 93% of the maximum predicted.  Myocardial perfusion imaging showed no evidence for ischemia.  Gated images showed normal wall motion and normal EF.  The stress EKG did demonstrate 1 mm of inferolateral ST depression at peak exercise.  Her peak blood pressure was 194/70.      ASSESSMENT AND PLAN:  Ms. Benedict is a pleasant 57-year-old female with a history of atypical chest discomfort and cardiac risk factors of hypertension, dyslipidemia and type 2 diabetes.  She also has a family history of coronary artery disease.  Fortunately, her recent nuclear stress test does not show any perfusion abnormalities.  At this point, I do not feel that she needs any further cardiac workup.  However, I did highly recommend that she ensure adequate control of her cardiac risk factors.  Her blood pressure continues to be a bit elevated at today's office visit and she had quite a significant rise in blood pressure with activity.  I did recommend she consider antihypertensive therapy.  It sounds like she has had some trouble with ACE inhibitors and ARBs in the past.  I did consider starting her on amlodipine, but she declined at this time and would like to discuss this further with her primary care provider, which is reasonable.  Again, she does have atorvastatin and I encouraged her to take this on a daily basis.  She states that she is going to try to remember to take this more regularly.  We also discussed a heart healthy diet and routine exercise program.      At this point, she will follow up with her primary care provider on a routine basis and follow up  in the Cardiology office on an as needed basis.      Thank you for allowing us to participate in the care of this very pleasant patient.         RADU WHITTINGTON PA-C             D: 2018   T: 2018   MT: ROSSANA      Name:     JUAN FLYNN   MRN:      [id number]        Account:      [id number]   :      1960           Service Date: 2018      Document: [id number]         Outpatient Encounter Prescriptions as of 2018   Medication Sig Dispense Refill     ACE/ARB NOT PRESCRIBED, INTENTIONAL, Please choose reason not prescribed, below       blood glucose (NO BRAND SPECIFIED) lancets standard Use to test blood sugar daily or as directed. 100 each 1     blood glucose monitoring (NO BRAND SPECIFIED) meter device kit Use to test blood sugar daily or as directed. 1 kit 0     blood glucose monitoring (NO BRAND SPECIFIED) test strip Use to test blood sugars daily or as directed 100 strip 3     Calcium Carbonate-Vitamin D (CALCIUM + D PO) Take 1,200 mg by mouth daily        DiphenhydrAMINE HCl (BENADRYL PO)        multivitamin, therapeutic with minerals (MULTI-VITAMIN) TABS tablet Take 1 tablet by mouth daily       omega 3 1000 MG CAPS Take 1 g by mouth 2 times daily 90 capsule      [DISCONTINUED] atorvastatin (LIPITOR) 10 MG tablet Take 1 tablet (10 mg) by mouth daily (Patient taking differently: Take 10 mg by mouth daily Patient takes 1 to 2 times a week) 30 tablet 0     tretinoin (RETIN-A) 0.05 % cream Spread a pea size amount into affected area topically at bedtime.  Use sunscreen SPF>20. (Patient not taking: Reported on 2018) 20 g 11     No facility-administered encounter medications on file as of 2018.        Again, thank you for allowing me to participate in the care of your patient.      Sincerely,    Radu Whittington PA-C     [facility]

## 2018-09-18 NOTE — PROGRESS NOTES
Service Date: 09/18/2018      HISTORY OF PRESENT ILLNESS:  Ms. Benedict is a pleasant 57-year-old female who presents to the Cardiology office today to review the results of recent nuclear stress test.      The patient does not have any history of known coronary artery disease, but does have cardiac risk factors of diabetes, hypertension and dyslipidemia.  The patient recently discussed some episodes of atypical chest discomfort with her primary care provider and an exercise treadmill study without any imaging was ordered.  She was able to exercise for 9 minutes.  The EKG did show some ST segment depression noted in a downsloping and horizontal pattern.  She did not have any symptoms with the test.  Due to this, she was sent to the Cardiology office.  She underwent consultation with Dr. Lopez and it was recommended that she undergo a nuclear exercise stress test.      At this time, the patient denies any new symptoms or change in her symptomatology.  She does have a prescription for atorvastatin at home which she is supposed to take on a daily basis, but states that sometimes she forgets to take the medication.  She has been worried about potential side effects from the medication, but denies that she has ever experienced any side effects from statin therapy.      CURRENT CARDIAC MEDICATIONS:  Atorvastatin 10 mg again prescribed daily, however, she is not taking it daily.      The remainder of her medications, allergies and review of systems were reviewed and as are documented separately.      PHYSICAL EXAMINATION:   GENERAL:  The patient is a 57-year-old female who appears her stated age.  She is in no apparent distress.   VITAL SIGNS:  Her blood pressure is 134/74, pulse is 74, weight is 138 pounds which is stable.     LUNGS:  Breathing is nonlabored.  Lungs are clear to auscultation.   CARDIAC:  Reveals a regular rate and rhythm.   NEUROLOGIC:  Alert and oriented.      Her recent exercise nuclear stress test  results were reviewed.  She was able to exercise for 11 minutes using the Saleem protocol.  She achieved a workload of 12.4 METS.  She did achieve a heart rate of 93% of the maximum predicted.  Myocardial perfusion imaging showed no evidence for ischemia.  Gated images showed normal wall motion and normal EF.  The stress EKG did demonstrate 1 mm of inferolateral ST depression at peak exercise.  Her peak blood pressure was 194/70.      ASSESSMENT AND PLAN:  Ms. Benedict is a pleasant 57-year-old female with a history of atypical chest discomfort and cardiac risk factors of hypertension, dyslipidemia and type 2 diabetes.  She also has a family history of coronary artery disease.  Fortunately, her recent nuclear stress test does not show any perfusion abnormalities.  At this point, I do not feel that she needs any further cardiac workup.  However, I did highly recommend that she ensure adequate control of her cardiac risk factors.  Her blood pressure continues to be a bit elevated at today's office visit and she had quite a significant rise in blood pressure with activity.  I did recommend she consider antihypertensive therapy.  It sounds like she has had some trouble with ACE inhibitors and ARBs in the past.  I did consider starting her on amlodipine, but she declined at this time and would like to discuss this further with her primary care provider, which is reasonable.  Again, she does have atorvastatin and I encouraged her to take this on a daily basis.  She states that she is going to try to remember to take this more regularly.  We also discussed a heart healthy diet and routine exercise program.      At this point, she will follow up with her primary care provider on a routine basis and follow up in the Cardiology office on an as needed basis.      Thank you for allowing us to participate in the care of this very pleasant patient.         RADU WHITTINGTON PA-C             D: 09/18/2018   T: 09/18/2018   MT:  LR      Name:     JUAN FLYNN   MRN:      [id number]        Account:      [id number]   :      1960           Service Date: 2018      Document: [id number]

## 2018-09-18 NOTE — PROGRESS NOTES
Please see separate dictation for HPI, PHYSICAL EXAM AND IMPRESSION/PLAN.    CURRENT MEDICATIONS:  Current Outpatient Prescriptions   Medication Sig Dispense Refill     ACE/ARB NOT PRESCRIBED, INTENTIONAL, Please choose reason not prescribed, below       atorvastatin (LIPITOR) 10 MG tablet Take 1 tablet (10 mg) by mouth daily (Patient taking differently: Take 10 mg by mouth daily Patient takes 1 to 2 times a week) 30 tablet 0     blood glucose (NO BRAND SPECIFIED) lancets standard Use to test blood sugar daily or as directed. 100 each 1     blood glucose monitoring (NO BRAND SPECIFIED) meter device kit Use to test blood sugar daily or as directed. 1 kit 0     blood glucose monitoring (NO BRAND SPECIFIED) test strip Use to test blood sugars daily or as directed 100 strip 3     Calcium Carbonate-Vitamin D (CALCIUM + D PO) Take 1,200 mg by mouth daily        DiphenhydrAMINE HCl (BENADRYL PO)        multivitamin, therapeutic with minerals (MULTI-VITAMIN) TABS tablet Take 1 tablet by mouth daily       omega 3 1000 MG CAPS Take 1 g by mouth 2 times daily 90 capsule      tretinoin (RETIN-A) 0.05 % cream Spread a pea size amount into affected area topically at bedtime.  Use sunscreen SPF>20. (Patient not taking: Reported on 9/18/2018) 20 g 11       ALLERGIES:     Allergies   Allergen Reactions     Lisinopril Cough     Losartan Cough     Pollen Extract        PAST MEDICAL HISTORY:  No past medical history on file.    PAST SURGICAL HISTORY:  Past Surgical History:   Procedure Laterality Date     HYSTERECTOMY TOTAL ABDOMINAL, BILATERAL SALPINGO-OOPHORECTOMY, COMBINED  2002    non-cancer related       SOCIAL HISTORY:  Social History     Social History     Marital status:      Spouse name: N/A     Number of children: N/A     Years of education: N/A     Social History Main Topics     Smoking status: Never Smoker     Smokeless tobacco: Never Used     Alcohol use No     Drug use: No     Sexual activity: No     Other Topics  Concern     None     Social History Narrative       FAMILY HISTORY:  Family History   Problem Relation Age of Onset     Hypertension Mother      Diabetes Mother      Rheumatoid Arthritis Mother      Diabetes Sister      Type I       Review of Systems:  Skin:  Negative       Eyes:  Positive for glasses    ENT:  Negative      Respiratory:  Negative for shortness of breath;cough     Cardiovascular:  edema;Negative for;syncope or near-syncope;lightheadedness;dizziness;palpitations;fatigue chest pain;Positive for    Gastroenterology: Positive for heartburn;nausea;vomiting    Genitourinary:  Negative      Musculoskeletal:  Negative      Neurologic:  Positive for headaches;numbness or tingling of hands;numbness or tingling of feet;memory problems    Psychiatric:  Positive for anxiety;depression    Heme/Lymph/Imm:  Negative for bleeding disorder    Endocrine:  Positive for diabetes       Reviewed. Remainder of the note dictated.    Tayla Nguyen PA-C

## 2018-09-24 ENCOUNTER — RADIANT APPOINTMENT (OUTPATIENT)
Dept: MAMMOGRAPHY | Facility: CLINIC | Age: 58
End: 2018-09-24
Payer: COMMERCIAL

## 2018-09-24 DIAGNOSIS — Z12.31 VISIT FOR SCREENING MAMMOGRAM: ICD-10-CM

## 2018-09-24 PROCEDURE — 77067 SCR MAMMO BI INCL CAD: CPT | Mod: TC

## 2019-01-07 ENCOUNTER — MYC MEDICAL ADVICE (OUTPATIENT)
Dept: FAMILY MEDICINE | Facility: CLINIC | Age: 59
End: 2019-01-07

## 2019-03-06 ENCOUNTER — OFFICE VISIT (OUTPATIENT)
Dept: DERMATOLOGY | Facility: CLINIC | Age: 59
End: 2019-03-06

## 2019-03-06 VITALS — DIASTOLIC BLOOD PRESSURE: 83 MMHG | SYSTOLIC BLOOD PRESSURE: 143 MMHG | OXYGEN SATURATION: 97 % | HEART RATE: 82 BPM

## 2019-03-06 DIAGNOSIS — L82.1 DERMATOSIS PAPULOSA NIGRA: Primary | ICD-10-CM

## 2019-03-06 PROCEDURE — 96999 UNLISTED SPEC DERM SVC/PX: CPT | Performed by: PHYSICIAN ASSISTANT

## 2019-03-06 NOTE — NURSING NOTE
Chief Complaint   Patient presents with     Derm Problem       Vitals:    03/06/19 1456   BP: 143/83   Pulse: 82   SpO2: 97%     Wt Readings from Last 1 Encounters:   09/18/18 62.6 kg (138 lb)       Corina Woodward LPN.................3/6/2019

## 2019-03-06 NOTE — LETTER
3/6/2019         RE: Jen Benedict  [address] [facility] [address]        Dear Colleague,    Thank you for referring your patient, Jen Benedict, to the [facility]. Please see a copy of my visit note below.    Jen Benedict is a 58 year old year old female patient here today for here today for evaluation and managment of more dpn lesions.  She is using tretinoin and hydroquinone. Previous sites healed well.  Remainder of the HPI, Meds, PMH, Allergies, FH, and SH was reviewed in chart.    Pertinent Hx:   DPN  History reviewed. No pertinent past medical history.    Past Surgical History:   Procedure Laterality Date     HYSTERECTOMY TOTAL ABDOMINAL, BILATERAL SALPINGO-OOPHORECTOMY, COMBINED  2002    non-cancer related        Family History   Problem Relation Age of Onset     Hypertension Mother      Diabetes Mother      Rheumatoid Arthritis Mother      Diabetes Sister         Type I       Social History     Socioeconomic History     Marital status:      Spouse name: Not on file     Number of children: Not on file     Years of education: Not on file     Highest education level: Not on file   Occupational History     Not on file   Social Needs     Financial resource strain: Not on file     Food insecurity:     Worry: Not on file     Inability: Not on file     Transportation needs:     Medical: Not on file     Non-medical: Not on file   Tobacco Use     Smoking status: Never Smoker     Smokeless tobacco: Never Used   Substance and Sexual Activity     Alcohol use: No     Drug use: No     Sexual activity: No   Lifestyle     Physical activity:     Days per week: Not on file     Minutes per session: Not on file     Stress: Not on file   Relationships     Social connections:     Talks on phone: Not on file     Gets together: Not on file     Attends [employer] service: Not on file     Active member of club or organization: Not on file     Attends meetings of clubs or organizations:  Not on file     Relationship status: Not on file     Intimate partner violence:     Fear of current or ex partner: Not on file     Emotionally abused: Not on file     Physically abused: Not on file     Forced sexual activity: Not on file   Other Topics Concern     Parent/sibling w/ CABG, MI or angioplasty before 65F 55M? Not Asked   Social History Narrative     Not on file       Outpatient Encounter Medications as of 3/6/2019   Medication Sig Dispense Refill     blood glucose (NO BRAND SPECIFIED) lancets standard Use to test blood sugar daily or as directed. 100 each 1     blood glucose monitoring (NO BRAND SPECIFIED) meter device kit Use to test blood sugar daily or as directed. 1 kit 0     blood glucose monitoring (NO BRAND SPECIFIED) test strip Use to test blood sugars daily or as directed 100 strip 3     Calcium Carbonate-Vitamin D (CALCIUM + D PO) Take 1,200 mg by mouth daily        DiphenhydrAMINE HCl (BENADRYL PO)        multivitamin, therapeutic with minerals (MULTI-VITAMIN) TABS tablet Take 1 tablet by mouth daily       omega 3 1000 MG CAPS Take 1 g by mouth 2 times daily 90 capsule      [DISCONTINUED] ACE/ARB NOT PRESCRIBED, INTENTIONAL, Please choose reason not prescribed, below       [DISCONTINUED] atorvastatin (LIPITOR) 10 MG tablet Take 1 tablet (10 mg) by mouth daily 90 tablet 3     [DISCONTINUED] tretinoin (RETIN-A) 0.05 % cream Spread a pea size amount into affected area topically at bedtime.  Use sunscreen SPF>20. (Patient not taking: Reported on 9/18/2018) 20 g 11     No facility-administered encounter medications on file as of 3/6/2019.              Review Of Systems  Skin: As above  Eyes: negative  Ears/Nose/Throat: negative  Respiratory: No shortness of breath, dyspnea on exertion, cough, or hemoptysis  Cardiovascular: negative  Gastrointestinal: negative  Genitourinary: negative  Musculoskeletal: negative  Neurologic: negative  Psychiatric: negative  Hematologic/Lymphatic/Immunologic:  negative  Endocrine: negative      O:   NAD, WDWN, Alert & Oriented, Mood & Affect wnl, Vitals stable   Here today alone   /83   Pulse 82   SpO2 97%    General appearance normal   Vitals stable   Alert, oriented and in no acute distress   Brown stuck on papules on orbits/forehead       Eyes: Conjunctivae/lids:Normal     ENT: Lips: normal    MSK:Normal    Pulm: Breathing Normal    Neuro/Psych: Orientation:Normal; Mood/Affect:Normal  A/P:  1. DPN  Left periocular and forehead  TANGENTIAL EXCISION:  After consent, anesthesia with LEC and prep, tangential excision performed.  No complications and routine wound care.    15 lesions   UV precautions reviewed with patient.  Skin care regimen reviewed with patient: Eliminate harsh soaps, i.e. Dial, zest, irsih spring; Mild soaps such as Cetaphil or Dove sensitive skin, avoid hot or cold showers, aggressive use of emollients including vanicream, cetaphil or cerave discussed with patient.    Return to clinic 8 weeks   Start tretinoin and hydroquinone for pih    Again, thank you for allowing me to participate in the care of your patient.        Sincerely,        Jelly Monique PA-C

## 2019-03-07 NOTE — PROGRESS NOTES
Jen Benedict is a 58 year old year old female patient here today for here today for evaluation and managment of more dpn lesions. She is using tretinoin and hydroquinone. Previous sites healed well.  Remainder of the HPI, Meds, PMH, Allergies, FH, and SH was reviewed in chart.    Pertinent Hx:   DPN  History reviewed. No pertinent past medical history.    Past Surgical History:   Procedure Laterality Date     HYSTERECTOMY TOTAL ABDOMINAL, BILATERAL SALPINGO-OOPHORECTOMY, COMBINED  2002    non-cancer related        Family History   Problem Relation Age of Onset     Hypertension Mother      Diabetes Mother      Rheumatoid Arthritis Mother      Diabetes Sister         Type I       Social History     Socioeconomic History     Marital status:      Spouse name: Not on file     Number of children: Not on file     Years of education: Not on file     Highest education level: Not on file   Occupational History     Not on file   Social Needs     Financial resource strain: Not on file     Food insecurity:     Worry: Not on file     Inability: Not on file     Transportation needs:     Medical: Not on file     Non-medical: Not on file   Tobacco Use     Smoking status: Never Smoker     Smokeless tobacco: Never Used   Substance and Sexual Activity     Alcohol use: No     Drug use: No     Sexual activity: No   Lifestyle     Physical activity:     Days per week: Not on file     Minutes per session: Not on file     Stress: Not on file   Relationships     Social connections:     Talks on phone: Not on file     Gets together: Not on file     Attends [employer] service: Not on file     Active member of club or organization: Not on file     Attends meetings of clubs or organizations: Not on file     Relationship status: Not on file     Intimate partner violence:     Fear of current or ex partner: Not on file     Emotionally abused: Not on file     Physically abused: Not on file     Forced sexual activity: Not on file   Other  Topics Concern     Parent/sibling w/ CABG, MI or angioplasty before 65F 55M? Not Asked   Social History Narrative     Not on file       Outpatient Encounter Medications as of 3/6/2019   Medication Sig Dispense Refill     blood glucose (NO BRAND SPECIFIED) lancets standard Use to test blood sugar daily or as directed. 100 each 1     blood glucose monitoring (NO BRAND SPECIFIED) meter device kit Use to test blood sugar daily or as directed. 1 kit 0     blood glucose monitoring (NO BRAND SPECIFIED) test strip Use to test blood sugars daily or as directed 100 strip 3     Calcium Carbonate-Vitamin D (CALCIUM + D PO) Take 1,200 mg by mouth daily        DiphenhydrAMINE HCl (BENADRYL PO)        multivitamin, therapeutic with minerals (MULTI-VITAMIN) TABS tablet Take 1 tablet by mouth daily       omega 3 1000 MG CAPS Take 1 g by mouth 2 times daily 90 capsule      [DISCONTINUED] ACE/ARB NOT PRESCRIBED, INTENTIONAL, Please choose reason not prescribed, below       [DISCONTINUED] atorvastatin (LIPITOR) 10 MG tablet Take 1 tablet (10 mg) by mouth daily 90 tablet 3     [DISCONTINUED] tretinoin (RETIN-A) 0.05 % cream Spread a pea size amount into affected area topically at bedtime.  Use sunscreen SPF>20. (Patient not taking: Reported on 9/18/2018) 20 g 11     No facility-administered encounter medications on file as of 3/6/2019.              Review Of Systems  Skin: As above  Eyes: negative  Ears/Nose/Throat: negative  Respiratory: No shortness of breath, dyspnea on exertion, cough, or hemoptysis  Cardiovascular: negative  Gastrointestinal: negative  Genitourinary: negative  Musculoskeletal: negative  Neurologic: negative  Psychiatric: negative  Hematologic/Lymphatic/Immunologic: negative  Endocrine: negative      O:   NAD, WDWN, Alert & Oriented, Mood & Affect wnl, Vitals stable   Here today alone   /83   Pulse 82   SpO2 97%    General appearance normal   Vitals stable   Alert, oriented and in no acute distress   Brown  stuck on papules on orbits/forehead       Eyes: Conjunctivae/lids:Normal     ENT: Lips: normal    MSK:Normal    Pulm: Breathing Normal    Neuro/Psych: Orientation:Normal; Mood/Affect:Normal  A/P:  1. DPN  Left periocular and forehead  Patient paid $150 during visit today.   TANGENTIAL EXCISION:  After consent, anesthesia with LEC and prep, tangential excision performed.  No complications and routine wound care.    15 lesions   UV precautions reviewed with patient.  Skin care regimen reviewed with patient: Eliminate harsh soaps, i.e. Dial, zest, irsih spring; Mild soaps such as Cetaphil or Dove sensitive skin, avoid hot or cold showers, aggressive use of emollients including vanicream, cetaphil or cerave discussed with patient.    Return to clinic 8 weeks   Start tretinoin and hydroquinone for pih

## 2019-03-14 ENCOUNTER — MYC MEDICAL ADVICE (OUTPATIENT)
Dept: FAMILY MEDICINE | Facility: CLINIC | Age: 59
End: 2019-03-14

## 2019-03-14 DIAGNOSIS — E11.9 TYPE 2 DIABETES MELLITUS WITHOUT COMPLICATION, WITHOUT LONG-TERM CURRENT USE OF INSULIN (H): Primary | ICD-10-CM

## 2019-03-14 NOTE — TELEPHONE ENCOUNTER
Pt states has [facility], accucheck test strips have been covered.  New order for Accucheck Francesca, daily testing, sent to jaime Swenson RN

## 2019-03-18 ENCOUNTER — MYC MEDICAL ADVICE (OUTPATIENT)
Dept: FAMILY MEDICINE | Facility: CLINIC | Age: 59
End: 2019-03-18

## 2019-03-18 DIAGNOSIS — E11.9 TYPE 2 DIABETES MELLITUS WITHOUT COMPLICATION, WITHOUT LONG-TERM CURRENT USE OF INSULIN (H): Primary | ICD-10-CM

## 2019-03-18 NOTE — TELEPHONE ENCOUNTER
Pt has requested via [employer]:    [employer]ri[employer] lancet for a ONE TOUCH ULTRA BLUE glucose monitor.   Pharmacy: [facility].     Routing request to provider for review/approval because:  Patient needs to be seen because:  Last OV 8/20/18 - overdue for diabetic OV and labs - [employer] message sent.   Orders cued    Idalmis DUNN RN

## 2019-03-20 ENCOUNTER — MYC MEDICAL ADVICE (OUTPATIENT)
Dept: FAMILY MEDICINE | Facility: CLINIC | Age: 59
End: 2019-03-20

## 2019-03-20 DIAGNOSIS — E11.9 TYPE 2 DIABETES MELLITUS WITHOUT COMPLICATION, WITHOUT LONG-TERM CURRENT USE OF INSULIN (H): Primary | ICD-10-CM

## 2019-04-03 ENCOUNTER — OFFICE VISIT (OUTPATIENT)
Dept: DERMATOLOGY | Facility: CLINIC | Age: 59
End: 2019-04-03
Payer: COMMERCIAL

## 2019-04-03 VITALS — OXYGEN SATURATION: 98 % | DIASTOLIC BLOOD PRESSURE: 88 MMHG | HEART RATE: 74 BPM | SYSTOLIC BLOOD PRESSURE: 151 MMHG

## 2019-04-03 DIAGNOSIS — L82.1 SEBORRHEIC KERATOSIS: Primary | ICD-10-CM

## 2019-04-03 DIAGNOSIS — L81.4 LENTIGO: ICD-10-CM

## 2019-04-03 PROCEDURE — 96999 UNLISTED SPEC DERM SVC/PX: CPT | Performed by: DERMATOLOGY

## 2019-04-03 PROCEDURE — 99213 OFFICE O/P EST LOW 20 MIN: CPT | Performed by: DERMATOLOGY

## 2019-04-03 NOTE — PROGRESS NOTES
Jen Benedict is a 58 year old year old female patient here today for cosmetic removal of seborrheic keratosis on eyelids. Patient reports the following modifying factors none.  Associated symptoms: none.  Patient has no other skin complaints today.  Remainder of the HPI, Meds, PMH, Allergies, FH, and SH was reviewed in chart.    History reviewed. No pertinent past medical history.    Past Surgical History:   Procedure Laterality Date     HYSTERECTOMY TOTAL ABDOMINAL, BILATERAL SALPINGO-OOPHORECTOMY, COMBINED  2002    non-cancer related        Family History   Problem Relation Age of Onset     Hypertension Mother      Diabetes Mother      Rheumatoid Arthritis Mother      Diabetes Sister         Type I       Social History     Socioeconomic History     Marital status:      Spouse name: Not on file     Number of children: Not on file     Years of education: Not on file     Highest education level: Not on file   Occupational History     Not on file   Social Needs     Financial resource strain: Not on file     Food insecurity:     Worry: Not on file     Inability: Not on file     Transportation needs:     Medical: Not on file     Non-medical: Not on file   Tobacco Use     Smoking status: Never Smoker     Smokeless tobacco: Never Used   Substance and Sexual Activity     Alcohol use: No     Drug use: No     Sexual activity: No   Lifestyle     Physical activity:     Days per week: Not on file     Minutes per session: Not on file     Stress: Not on file   Relationships     Social connections:     Talks on phone: Not on file     Gets together: Not on file     Attends Rastafarian service: Not on file     Active member of club or organization: Not on file     Attends meetings of clubs or organizations: Not on file     Relationship status: Not on file     Intimate partner violence:     Fear of current or ex partner: Not on file     Emotionally abused: Not on file     Physically abused: Not on file     Forced sexual  activity: Not on file   Other Topics Concern     Parent/sibling w/ CABG, MI or angioplasty before 65F 55M? Not Asked   Social History Narrative     Not on file       Outpatient Encounter Medications as of 4/3/2019   Medication Sig Dispense Refill     blood glucose (NO BRAND SPECIFIED) lancets standard Use to test blood sugar  daily 100 each 0     blood glucose (NO BRAND SPECIFIED) lancets standard Use to test blood sugar one time daily or as directed. 100 each 1     blood glucose (NO BRAND SPECIFIED) lancets standard Use to test blood sugar daily or as directed. 100 each 1     blood glucose (NO BRAND SPECIFIED) test strip Use to test blood sugar daily 100 strip 0     blood glucose (NO BRAND SPECIFIED) test strip Use to test blood sugar one time daily or as directed. 100 each 1     blood glucose monitoring (NO BRAND SPECIFIED) meter device kit Use to test blood sugar 1 time daily 1 kit 0     blood glucose monitoring (NO BRAND SPECIFIED) meter device kit Use to test blood sugar daily or as directed. 1 kit 0     blood glucose monitoring (NO BRAND SPECIFIED) test strip Use to test blood sugars daily or as directed 100 strip 3     Calcium Carbonate-Vitamin D (CALCIUM + D PO) Take 1,200 mg by mouth daily        DiphenhydrAMINE HCl (BENADRYL PO)        multivitamin, therapeutic with minerals (MULTI-VITAMIN) TABS tablet Take 1 tablet by mouth daily       omega 3 1000 MG CAPS Take 1 g by mouth 2 times daily 90 capsule      No facility-administered encounter medications on file as of 4/3/2019.              Review Of Systems  Skin: As above  Eyes: negative  Ears/Nose/Throat: negative  Respiratory: No shortness of breath, dyspnea on exertion, cough, or hemoptysis  Cardiovascular: negative  Gastrointestinal: negative  Genitourinary: negative  Musculoskeletal: negative  Neurologic: negative  Psychiatric: negative  Hematologic/Lymphatic/Immunologic: negative  Endocrine: negative      O:   NAD, WDWN, Alert & Oriented, Mood & Affect  wnl, Vitals stable   Here today alone   /88   Pulse 74   SpO2 98%    General appearance normal   Vitals stable   Alert, oriented and in no acute distress     Eyelids brown stuck on papules     Stuck on papules and brown macules on trunk and ext      The remainder of expanded problem focused exam was unremarkable; the following areas were examined:  scalp/hair, conjunctiva/lids, face, neck, lips, chest, digits/nails, RUE, LUE.      Eyes: Conjunctivae/lids:Normal     ENT: Lips, buccal mucosa, tongue: normal    MSK:Normal    Cardiovascular: peripheral edema none    Pulm: Breathing Normal    Lymph Nodes: No Head and Neck Lymphadenopathy     Neuro/Psych: Orientation:Normal; Mood/Affect:Normal      A/P:  1. Seborrheic keratosis, lentigo,   2. Seborrheic keratosis eyelids  Cosmetic chatge discussed with patient 200 20 lesions    L eyelid x9  R eyelid x11  TANGENTIAL EXCISION:  After consent, anesthesia with LEC and prep, tangential excision performed.  No complications and routine wound care.      BENIGN LESIONS DISCUSSED WITH PATIENT:  I discussed the specifics of tumor, prognosis, and genetics of benign lesions.  I explained that treatment of these lesions would be purely cosmetic and not medically neccessary.  I discussed with patient different removal options including excision, cautery and /or laser.      Nature and genetics of benign skin lesions dicussed with patient.  Signs and Symptoms of skin cancer discussed with patient.  Patient encouraged to perform monthly skin exams.  UV precautions reviewed with patient.  Patient to follow up with Primary Care provider regarding elevated blood pressure.  Skin care regimen reviewed with patient: Eliminate harsh soaps, i.e. Dial, zest, irsih spring; Mild soaps such as Cetaphil or Dove sensitive skin, avoid hot or cold showers, aggressive use of emollients including vanicream, cetaphil or cerave discussed with patient.    Risks of non-melanoma skin cancer discussed  with patient   Return to clinic prn

## 2019-04-03 NOTE — NURSING NOTE
"Initial /88   Pulse 74   SpO2 98%  Estimated body mass index is 26.07 kg/m  as calculated from the following:    Height as of 4/13/18: 1.549 m (5' 1\").    Weight as of 9/18/18: 62.6 kg (138 lb). .      "

## 2019-04-03 NOTE — LETTER
4/3/2019         RE: Jen Benedict  [id number] [facility] [address]        Dear Colleague,    Thank you for referring your patient, Jen Benedict, to the [facility]. Please see a copy of my visit note below.    Jen Benedict is a 58 year old year old female patient here today for cosmetic removal of seborrheic keratosis on eyelids. Patient reports the following modifying factors none.  Associated symptoms: none.  Patient has no other skin complaints today.  Remainder of the HPI, Meds, PMH, Allergies, FH, and SH was reviewed in chart.    History reviewed. No pertinent past medical history.    Past Surgical History:   Procedure Laterality Date     HYSTERECTOMY TOTAL ABDOMINAL, BILATERAL SALPINGO-OOPHORECTOMY, COMBINED  2002    non-cancer related        Family History   Problem Relation Age of Onset     Hypertension Mother      Diabetes Mother      Rheumatoid Arthritis Mother      Diabetes Sister         Type I       Social History     Socioeconomic History     Marital status:      Spouse name: Not on file     Number of children: Not on file     Years of education: Not on file     Highest education level: Not on file   Occupational History     Not on file   Social Needs     Financial resource strain: Not on file     Food insecurity:     Worry: Not on file     Inability: Not on file     Transportation needs:     Medical: Not on file     Non-medical: Not on file   Tobacco Use     Smoking status: Never Smoker     Smokeless tobacco: Never Used   Substance and Sexual Activity     Alcohol use: No     Drug use: No     Sexual activity: No   Lifestyle     Physical activity:     Days per week: Not on file     Minutes per session: Not on file     Stress: Not on file   Relationships     Social connections:     Talks on phone: Not on file     Gets together: Not on file     Attends [employer] service: Not on file     Active member of club or organization: Not on file     Attends meetings  of clubs or organizations: Not on file     Relationship status: Not on file     Intimate partner violence:     Fear of current or ex partner: Not on file     Emotionally abused: Not on file     Physically abused: Not on file     Forced sexual activity: Not on file   Other Topics Concern     Parent/sibling w/ CABG, MI or angioplasty before 65F 55M? Not Asked   Social History Narrative     Not on file       Outpatient Encounter Medications as of 4/3/2019   Medication Sig Dispense Refill     blood glucose (NO BRAND SPECIFIED) lancets standard Use to test blood sugar  daily 100 each 0     blood glucose (NO BRAND SPECIFIED) lancets standard Use to test blood sugar one time daily or as directed. 100 each 1     blood glucose (NO BRAND SPECIFIED) lancets standard Use to test blood sugar daily or as directed. 100 each 1     blood glucose (NO BRAND SPECIFIED) test strip Use to test blood sugar daily 100 strip 0     blood glucose (NO BRAND SPECIFIED) test strip Use to test blood sugar one time daily or as directed. 100 each 1     blood glucose monitoring (NO BRAND SPECIFIED) meter device kit Use to test blood sugar 1 time daily 1 kit 0     blood glucose monitoring (NO BRAND SPECIFIED) meter device kit Use to test blood sugar daily or as directed. 1 kit 0     blood glucose monitoring (NO BRAND SPECIFIED) test strip Use to test blood sugars daily or as directed 100 strip 3     Calcium Carbonate-Vitamin D (CALCIUM + D PO) Take 1,200 mg by mouth daily        DiphenhydrAMINE HCl (BENADRYL PO)        multivitamin, therapeutic with minerals (MULTI-VITAMIN) TABS tablet Take 1 tablet by mouth daily       omega 3 1000 MG CAPS Take 1 g by mouth 2 times daily 90 capsule      No facility-administered encounter medications on file as of 4/3/2019.              Review Of Systems  Skin: As above  Eyes: negative  Ears/Nose/Throat: negative  Respiratory: No shortness of breath, dyspnea on exertion, cough, or hemoptysis  Cardiovascular:  negative  Gastrointestinal: negative  Genitourinary: negative  Musculoskeletal: negative  Neurologic: negative  Psychiatric: negative  Hematologic/Lymphatic/Immunologic: negative  Endocrine: negative      O:   NAD, WDWN, Alert & Oriented, Mood & Affect wnl, Vitals stable   Here today alone   /88   Pulse 74   SpO2 98%    General appearance normal   Vitals stable   Alert, oriented and in no acute distress     Eyelids brown stuck on papules     Stuck on papules and brown macules on trunk and ext      The remainder of expanded problem focused exam was unremarkable; the following areas were examined:  scalp/hair, conjunctiva/lids, face, neck, lips, chest, digits/nails, RUE, LUE.      Eyes: Conjunctivae/lids:Normal     ENT: Lips, buccal mucosa, tongue: normal    MSK:Normal    Cardiovascular: peripheral edema none    Pulm: Breathing Normal    Lymph Nodes: No Head and Neck Lymphadenopathy     Neuro/Psych: Orientation:Normal; Mood/Affect:Normal      A/P:  1. Seborrheic keratosis, lentigo,   2. Seborrheic keratosis eyelids  Cosmetic chatge discussed with patient 200 20 lesions    L eyelid x9  R eyelid x11  TANGENTIAL EXCISION:  After consent, anesthesia with LEC and prep, tangential excision performed.  No complications and routine wound care.      BENIGN LESIONS DISCUSSED WITH PATIENT:  I discussed the specifics of tumor, prognosis, and genetics of benign lesions.  I explained that treatment of these lesions would be purely cosmetic and not medically neccessary.  I discussed with patient different removal options including excision, cautery and /or laser.      Nature and genetics of benign skin lesions dicussed with patient.  Signs and Symptoms of skin cancer discussed with patient.  Patient encouraged to perform monthly skin exams.  UV precautions reviewed with patient.  Patient to follow up with Primary Care provider regarding elevated blood pressure.  Skin care regimen reviewed with patient: Eliminate harsh soaps,  i.e. Dial, zest, irsih spring; Mild soaps such as Cetaphil or Dove sensitive skin, avoid hot or cold showers, aggressive use of emollients including vanicream, cetaphil or cerave discussed with patient.    Risks of non-melanoma skin cancer discussed with patient   Return to clinic prn       Again, thank you for allowing me to participate in the care of your patient.        Sincerely,        Vasyl Munoz MD

## 2019-04-18 ENCOUNTER — TELEPHONE (OUTPATIENT)
Dept: DERMATOLOGY | Facility: CLINIC | Age: 59
End: 2019-04-18

## 2019-04-18 NOTE — TELEPHONE ENCOUNTER
Patient paid 200.00 at her office appointment and now is receiving a bill for 201.00. Please clarify if the remaining cost would be for the OV or was the patient expecting to just be billed for 200.  A/P:  1. Seborrheic keratosis, lentigo,   2. Seborrheic keratosis eyelids  Cosmetic chatge discussed with patient 200 20 lesions     L eyelid x9  R eyelid x11    Natalia Grande RN

## 2019-04-18 NOTE — TELEPHONE ENCOUNTER
Reason for Call:  Other call back    Detailed comments: pt calling stating she received a bill for $201 from OV 4/3. She spoke to billing dept and they told her the total bill was $401, she paid $200 in office that day of her appt. She is not sure why she is still receiving a bill for $201. Was there something more that was done that she is un ware of or coded differently then previous visits. She has never been charged in the past and this is been an ongoing issue.     Phone Number Patient can be reached at: Home number on file [phone] (home)    Best Time: any     Can we leave a detailed message on this number? YES    Call taken on 4/18/2019 at 10:14 AM by Umm Bailey

## 2019-04-22 ENCOUNTER — OFFICE VISIT (OUTPATIENT)
Dept: FAMILY MEDICINE | Facility: CLINIC | Age: 59
End: 2019-04-22
Payer: COMMERCIAL

## 2019-04-22 VITALS
HEART RATE: 74 BPM | DIASTOLIC BLOOD PRESSURE: 80 MMHG | BODY MASS INDEX: 27.29 KG/M2 | TEMPERATURE: 98.2 F | SYSTOLIC BLOOD PRESSURE: 135 MMHG | HEIGHT: 60 IN | OXYGEN SATURATION: 98 % | WEIGHT: 139 LBS | RESPIRATION RATE: 12 BRPM

## 2019-04-22 DIAGNOSIS — E11.9 TYPE 2 DIABETES MELLITUS WITHOUT COMPLICATION, WITHOUT LONG-TERM CURRENT USE OF INSULIN (H): Primary | ICD-10-CM

## 2019-04-22 DIAGNOSIS — Z13.89 SCREENING FOR DIABETIC PERIPHERAL NEUROPATHY: ICD-10-CM

## 2019-04-22 DIAGNOSIS — Z11.4 SCREENING FOR HIV (HUMAN IMMUNODEFICIENCY VIRUS): ICD-10-CM

## 2019-04-22 LAB
CHOLEST SERPL-MCNC: 270 MG/DL
CREAT UR-MCNC: 99 MG/DL
HBA1C MFR BLD: 6.5 % (ref 0–5.6)
HDLC SERPL-MCNC: 62 MG/DL
LDLC SERPL CALC-MCNC: 182 MG/DL
MICROALBUMIN UR-MCNC: 8 MG/L
MICROALBUMIN/CREAT UR: 8.3 MG/G CR (ref 0–25)
NONHDLC SERPL-MCNC: 208 MG/DL
TRIGL SERPL-MCNC: 129 MG/DL

## 2019-04-22 PROCEDURE — 99214 OFFICE O/P EST MOD 30 MIN: CPT | Performed by: NURSE PRACTITIONER

## 2019-04-22 PROCEDURE — 36415 COLL VENOUS BLD VENIPUNCTURE: CPT | Performed by: NURSE PRACTITIONER

## 2019-04-22 PROCEDURE — 99207 C FOOT EXAM  NO CHARGE: CPT | Performed by: NURSE PRACTITIONER

## 2019-04-22 PROCEDURE — 83036 HEMOGLOBIN GLYCOSYLATED A1C: CPT | Performed by: NURSE PRACTITIONER

## 2019-04-22 PROCEDURE — 80061 LIPID PANEL: CPT | Performed by: NURSE PRACTITIONER

## 2019-04-22 PROCEDURE — 82043 UR ALBUMIN QUANTITATIVE: CPT | Performed by: NURSE PRACTITIONER

## 2019-04-22 PROCEDURE — 87389 HIV-1 AG W/HIV-1&-2 AB AG IA: CPT | Performed by: NURSE PRACTITIONER

## 2019-04-22 RX ORDER — ATORVASTATIN CALCIUM 20 MG/1
20 TABLET, FILM COATED ORAL EVERY EVENING
Qty: 90 TABLET | Refills: 1 | Status: SHIPPED | OUTPATIENT
Start: 2019-04-22

## 2019-04-22 ASSESSMENT — PAIN SCALES - GENERAL: PAINLEVEL: NO PAIN (0)

## 2019-04-22 ASSESSMENT — MIFFLIN-ST. JEOR: SCORE: 1125.97

## 2019-04-22 NOTE — PROGRESS NOTES
SUBJECTIVE:   Jen Benedict is a 58 year old female who presents to clinic today for the following   health issues:      Diabetes Follow-up      Patient is checking blood sugars:  Results range from 112 to 120    Diabetic concerns: None     Symptoms of hypoglycemia (low blood sugar): none     Paresthesias (numbness or burning in feet) or sores: Yes some numbness - saw podiatrist regarding a nerve     Date of last diabetic eye exam: Last year    BP Readings from Last 2 Encounters:   04/03/19 151/88   03/06/19 143/83     Hemoglobin A1C (%)   Date Value   08/20/2018 6.6 (H)   04/20/2018 6.4 (H)     LDL Cholesterol Calculated (mg/dL)   Date Value   04/20/2018 163 (H)   11/27/2017 158 (H)       Wt Readings from Last 10 Encounters:   04/22/19 63 kg (139 lb)   09/18/18 62.6 kg (138 lb)   08/28/18 62.6 kg (138 lb)   08/20/18 61.7 kg (136 lb)   04/13/18 63.5 kg (140 lb)   12/22/17 58.5 kg (129 lb)   11/27/17 58.5 kg (129 lb)   08/21/17 56.1 kg (123 lb 9.6 oz)   06/02/17 59.9 kg (132 lb)   05/05/17 65.3 kg (144 lb)     .Diabetes Management Resources          Additional history: as documented    Reviewed  and updated as needed this visit by clinical staff  Allergies  Meds         Reviewed and updated as needed this visit by Provider         Patient Active Problem List   Diagnosis     Advanced directives, counseling/discussion     CARDIOVASCULAR SCREENING; LDL GOAL LESS THAN 160     Family history of rheumatoid arthritis     Accessory skin tags     Osteopenia     Type 2 diabetes mellitus without complication, without long-term current use of insulin (H)     Essential hypertension     Past Surgical History:   Procedure Laterality Date     HYSTERECTOMY TOTAL ABDOMINAL, BILATERAL SALPINGO-OOPHORECTOMY, COMBINED  2002    non-cancer related       Social History     Tobacco Use     Smoking status: Never Smoker     Smokeless tobacco: Never Used   Substance Use Topics     Alcohol use: No     Family History   Problem Relation  "Age of Onset     Hypertension Mother      Diabetes Mother      Rheumatoid Arthritis Mother      Diabetes Sister         Type I           ROS:  Constitutional, HEENT, cardiovascular, pulmonary, gi and gu systems are negative, except as otherwise noted.    OBJECTIVE:                                                    /80   Pulse 74   Temp 98.2  F (36.8  C) (Tympanic)   Resp 12   Ht 1.53 m (5' 0.25\")   Wt 63 kg (139 lb)   SpO2 98%   BMI 26.92 kg/m    Body mass index is 26.92 kg/m .  GENERAL APPEARANCE: healthy, alert and no distress  RESP: lungs clear to auscultation - no rales, rhonchi or wheezes  CV: regular rates and rhythm, normal S1 S2, no S3 or S4 and no murmur, click or rub  ABDOMEN: soft, nontender, without hepatosplenomegaly or masses and bowel sounds normal  MS: extremities normal- no gross deformities noted  SKIN: no suspicious lesions or rashes  DIABETIC FOOT EXAM: normal DP and PT pulses, no trophic changes or ulcerative lesions and normal sensory exam  PSYCH: mentation appears normal and affect normal/bright    Diagnostic test results:  Diagnostic Test Results:  pending      ASSESSMENT/PLAN:                                                    1. Type 2 diabetes mellitus without complication, without long-term current use of insulin (H)  Will check labs today   Did not tolerate ACE/ARB   Will start statin   Continue diet and exercise     - HEMOGLOBIN A1C  - Lipid panel reflex to direct LDL Fasting  - Albumin Random Urine Quantitative with Creat Ratio  - atorvastatin (LIPITOR) 20 MG tablet; Take 1 tablet (20 mg) by mouth every evening  Dispense: 90 tablet; Refill: 1  - ACE/ARB/ARNI NOT PRESCRIBED (INTENTIONAL); Please choose reason not prescribed, below    2. Screening for HIV (human immunodeficiency virus)  - HIV Screening    3. Screening for diabetic peripheral neuropathy  - FOOT EXAM  NO CHARGE [46516.114]      Patient Instructions   Labs today   Start cholesterol medication in evening " Lipitor 20 mg daily     Work on diet and exercise         Elin Arboleda NP  [facility]

## 2019-04-22 NOTE — PATIENT INSTRUCTIONS
Labs today   Start cholesterol medication in evening Lipitor 20 mg daily     Work on diet and exercise

## 2019-04-22 NOTE — TELEPHONE ENCOUNTER
Message left to return call. Let patient know office visit charge is remainder of bill.     Roxie De Los Santos RN

## 2019-04-22 NOTE — NURSING NOTE
"Chief Complaint   Patient presents with     Diabetes     recheck       Initial /82   Pulse 74   Temp 98.2  F (36.8  C) (Tympanic)   Resp 12   Ht 1.53 m (5' 0.25\")   Wt 63 kg (139 lb)   SpO2 98%   BMI 26.92 kg/m   Estimated body mass index is 26.92 kg/m  as calculated from the following:    Height as of this encounter: 1.53 m (5' 0.25\").    Weight as of this encounter: 63 kg (139 lb).    Patient presents to the clinic using No DME    Health Maintenance that is potentially due pending provider review:  labs    Possibly completing today per provider review.    Is there anyone who you would like to be able to receive your results? No  If yes have patient fill out [facility]      " 4

## 2019-04-23 LAB — HIV 1+2 AB+HIV1 P24 AG SERPL QL IA: NONREACTIVE

## 2019-04-23 NOTE — TELEPHONE ENCOUNTER
"Spoke to patient and advised of Dr. Munoz's note and she would like to know:    \"Am I going to be charged for an office visit every time?.. I have never been charged before for an office visit when I have been there and I don't like to be surprised-I need to budget for the cost if that is the case..\"     \"Billing told me the charge depends upon how the visit was coded and I am self pay, so I need to know for the future..\"     Please advise. Roxie De Los Santos RN    "

## 2019-04-23 NOTE — TELEPHONE ENCOUNTER
I hadnt seen the patient since 2017    So there wouldn't be an office charge on the next vist if she were to come every omnth to get them removed, but after 2 years there would be

## 2019-06-21 ENCOUNTER — OFFICE VISIT (OUTPATIENT)
Dept: FAMILY MEDICINE | Facility: CLINIC | Age: 59
End: 2019-06-21
Payer: COMMERCIAL

## 2019-06-21 VITALS
HEART RATE: 72 BPM | TEMPERATURE: 96.5 F | OXYGEN SATURATION: 98 % | RESPIRATION RATE: 16 BRPM | DIASTOLIC BLOOD PRESSURE: 78 MMHG | WEIGHT: 138.2 LBS | BODY MASS INDEX: 26.77 KG/M2 | SYSTOLIC BLOOD PRESSURE: 128 MMHG

## 2019-06-21 DIAGNOSIS — R19.7 DIARRHEA, UNSPECIFIED TYPE: Primary | ICD-10-CM

## 2019-06-21 LAB
ALBUMIN SERPL-MCNC: 3.6 G/DL (ref 3.4–5)
ALP SERPL-CCNC: 93 U/L (ref 40–150)
ALT SERPL W P-5'-P-CCNC: 32 U/L (ref 0–50)
ANION GAP SERPL CALCULATED.3IONS-SCNC: 4 MMOL/L (ref 3–14)
AST SERPL W P-5'-P-CCNC: 14 U/L (ref 0–45)
BASOPHILS # BLD AUTO: 0.1 10E9/L (ref 0–0.2)
BASOPHILS NFR BLD AUTO: 0.6 %
BILIRUB SERPL-MCNC: 0.4 MG/DL (ref 0.2–1.3)
BUN SERPL-MCNC: 12 MG/DL (ref 7–30)
CALCIUM SERPL-MCNC: 9 MG/DL (ref 8.5–10.1)
CHLORIDE SERPL-SCNC: 107 MMOL/L (ref 94–109)
CO2 SERPL-SCNC: 30 MMOL/L (ref 20–32)
CREAT SERPL-MCNC: 0.56 MG/DL (ref 0.52–1.04)
DIFFERENTIAL METHOD BLD: NORMAL
EOSINOPHIL # BLD AUTO: 0.4 10E9/L (ref 0–0.7)
EOSINOPHIL NFR BLD AUTO: 5.4 %
ERYTHROCYTE [DISTWIDTH] IN BLOOD BY AUTOMATED COUNT: 13 % (ref 10–15)
GFR SERPL CREATININE-BSD FRML MDRD: >90 ML/MIN/{1.73_M2}
GLUCOSE SERPL-MCNC: 175 MG/DL (ref 70–99)
HCT VFR BLD AUTO: 39.4 % (ref 35–47)
HGB BLD-MCNC: 12.7 G/DL (ref 11.7–15.7)
LYMPHOCYTES # BLD AUTO: 2 10E9/L (ref 0.8–5.3)
LYMPHOCYTES NFR BLD AUTO: 24.8 %
MCH RBC QN AUTO: 28.5 PG (ref 26.5–33)
MCHC RBC AUTO-ENTMCNC: 32.2 G/DL (ref 31.5–36.5)
MCV RBC AUTO: 89 FL (ref 78–100)
MONOCYTES # BLD AUTO: 0.5 10E9/L (ref 0–1.3)
MONOCYTES NFR BLD AUTO: 6.8 %
NEUTROPHILS # BLD AUTO: 4.9 10E9/L (ref 1.6–8.3)
NEUTROPHILS NFR BLD AUTO: 62.4 %
PLATELET # BLD AUTO: 343 10E9/L (ref 150–450)
POTASSIUM SERPL-SCNC: 4.1 MMOL/L (ref 3.4–5.3)
PROT SERPL-MCNC: 7.7 G/DL (ref 6.8–8.8)
RBC # BLD AUTO: 4.45 10E12/L (ref 3.8–5.2)
SODIUM SERPL-SCNC: 141 MMOL/L (ref 133–144)
WBC # BLD AUTO: 7.9 10E9/L (ref 4–11)

## 2019-06-21 PROCEDURE — 99213 OFFICE O/P EST LOW 20 MIN: CPT | Performed by: NURSE PRACTITIONER

## 2019-06-21 PROCEDURE — 85025 COMPLETE CBC W/AUTO DIFF WBC: CPT | Performed by: NURSE PRACTITIONER

## 2019-06-21 PROCEDURE — 80053 COMPREHEN METABOLIC PANEL: CPT | Performed by: NURSE PRACTITIONER

## 2019-06-21 PROCEDURE — 36415 COLL VENOUS BLD VENIPUNCTURE: CPT | Performed by: NURSE PRACTITIONER

## 2019-06-21 NOTE — PROGRESS NOTES
Subjective     Jen Benedict is a 58 year old female who presents to clinic today for the following health issues:    HPI   Gastrointestinal symptoms      Duration: 3 and half weeks    Description:           ABDOMINAL PAIN - all over the stomach  .   Pain is described as crampping .    Intensity:  moderate    Accompanying signs and symptoms:  diarrhea    History  Previous {similar problem: NO  Previous evaluation:  none    Aggravating factors: none and certain foods - like onion rings and onion    Alleviating factors: nothing    Other Therapies tried: Pepto Pedialyte for adults and bland foods    Comes and goes     Initially was severe seems a bit improved     No fever    No loose stools since Wednesday     Did not move yesterday     Works in [employer]- one resident with loose stool.   No nausea or vomiting   Took imodium the first few days     Reviewed and updated as needed this visit by Provider         Review of Systems   ROS COMP: Constitutional, HEENT, cardiovascular, pulmonary, gi and gu systems are negative, except as otherwise noted.      Objective    /78 (BP Location: Right arm, Patient Position: Sitting, Cuff Size: Adult Regular)   Pulse 72   Temp 96.5  F (35.8  C) (Tympanic)   Resp 16   Wt 62.7 kg (138 lb 3.2 oz)   SpO2 98%   BMI 26.77 kg/m    Body mass index is 26.77 kg/m .  Physical Exam   GENERAL: healthy, alert and no distress  EYES: Eyes grossly normal to inspection, PERRL and conjunctivae and sclerae normal  HENT: ear canals and TM's normal, nose and mouth without ulcers or lesions  NECK: no adenopathy, no asymmetry, masses, or scars and thyroid normal to palpation  RESP: lungs clear to auscultation - no rales, rhonchi or wheezes  CV: regular rate and rhythm, normal S1 S2, no S3 or S4, no murmur, click or rub, no peripheral edema and peripheral pulses strong  ABDOMEN: soft, nontender, no hepatosplenomegaly, no masses and bowel sounds normal  MS: no gross musculoskeletal defects noted, no  "edema  SKIN: no suspicious lesions or rashes  NEURO: Normal strength and tone, mentation intact and speech normal  PSYCH: mentation appears normal, affect normal/bright    Diagnostic Test Results:  Labs reviewed in Epic  Results for orders placed or performed in visit on 06/21/19 (from the past 24 hour(s))   CBC with platelets differential   Result Value Ref Range    WBC 7.9 4.0 - 11.0 10e9/L    RBC Count 4.45 3.8 - 5.2 10e12/L    Hemoglobin 12.7 11.7 - 15.7 g/dL    Hematocrit 39.4 35.0 - 47.0 %    MCV 89 78 - 100 fl    MCH 28.5 26.5 - 33.0 pg    MCHC 32.2 31.5 - 36.5 g/dL    RDW 13.0 10.0 - 15.0 %    Platelet Count 343 150 - 450 10e9/L    % Neutrophils 62.4 %    % Lymphocytes 24.8 %    % Monocytes 6.8 %    % Eosinophils 5.4 %    % Basophils 0.6 %    Absolute Neutrophil 4.9 1.6 - 8.3 10e9/L    Absolute Lymphocytes 2.0 0.8 - 5.3 10e9/L    Absolute Monocytes 0.5 0.0 - 1.3 10e9/L    Absolute Eosinophils 0.4 0.0 - 0.7 10e9/L    Absolute Basophils 0.1 0.0 - 0.2 10e9/L    Diff Method Automated Method      CMP- pending        Assessment & Plan       ICD-10-CM    1. Diarrhea, unspecified type R19.7 CBC with platelets differential     Comprehensive metabolic panel     Clostridium difficile toxin B PCR     Enteric Bacteria and Virus Panel by [facility] Stool     Ova and Parasite Exam Routine     Fecal Lactoferrin      will check stool specimens unless continues to resolve over the weekend     BMI:   Estimated body mass index is 26.77 kg/m  as calculated from the following:    Height as of 4/22/19: 1.53 m (5' 0.25\").    Weight as of this encounter: 62.7 kg (138 lb 3.2 oz).   Weight management plan: Discussed healthy diet and exercise guidelines        Patient Instructions   Blood counts were normal   One more test pending   Stick with bland diet   Will check stool specimens for infection     Patient Education     Diarrhea with Uncertain Cause (Adult)    Diarrhea is when stools are loose and watery. This can be caused by:    Viral " infections    Bacterial infections    Food poisoning    Parasites    Irritable bowel syndrome (IBS)    Inflammatory bowel diseases such as ulcerative colitis, Crohn's disease, and celiac disease    Food intolerance, such as to lactose, the sugar found in milk and milk products    Reaction to medicines like antibiotics, laxatives, cancer drugs, and antacids  Along with diarrhea, you may also have:    Abdominal pain and cramping    Nausea and vomiting    Loss of bowel control    Fever and chills    Bloody stools  In some cases, antibiotics may help to treat diarrhea. You may have a stool sample test. This is done to see what is causing your diarrhea, and if antibiotics will help treat it. The results of a stool sample test may take up to 2 days. The healthcare provider may not give you antibiotics until he or she has the stool test results.  Diarrhea can cause dehydration. This is the loss of too much water and other fluids from the body. When this occurs, body fluid must be replaced. This can be done with oral rehydration solutions. Oral rehydration solutions are available at drugstores and grocery stores without a prescription. Sports drinks are not the best choice if you are very dehydrated. They have too much sugar and not enough electrolytes.  Home care  Follow all instructions given by your healthcare provider. Rest at home for the next 24 hours, or until you feel better. Avoid caffeine, tobacco, and alcohol. These can make diarrhea, cramping, and pain worse.  If taking medicines:    Over-the-counter nausea and diarrhea medicines are generally OK unless you experience fever or blood stool. Check with your doctor first in those circumstances.    You may use acetaminophen or NSAID medicines like ibuprofen or naproxen to reduce pain and fever. Don t use these if you have chronic liver or kidney disease, or ever had a stomach ulcer or gastrointestinal bleeding. Don't use NSAID medicines if you are already taking one  for another condition (like arthritis) or are on daily aspirin therapy (such as for heart disease or after a stroke). Talk with your healthcare provider first.    If antibiotics were prescribed, be sure you take them until they are finished. Don t stop taking them even when you feel better. Antibiotics must be taken as a full course.  To prevent the spread of illness:    Remember that washing with soap and water and using alcohol-based  is the best way to prevent the spread of infection. Dry your hands with a single use towel (like a paper towel).    Clean the toilet after each use.    Wash your hands before eating.    Wash your hands before and after preparing food. Keep in mind that people with diarrhea or vomiting should not prepare food for others.    Wash your hands after using cutting boards, countertops, and knives that have been in contact with raw foods.    Wash and then peel fruits and vegetables.    Keep uncooked meats away from cooked and ready-to-eat foods.    Use a food thermometer when cooking. Cook poultry to at least 165 F (74 C). Cook ground meat (beef, veal, pork, lamb) to at least 160 F (71 C). Cook fresh beef, veal, lamb, and pork to at least 145 F (63 C).    Don t eat raw or undercooked eggs (poached or meghan side up), poultry, meat, or unpasteurized milk and juices.  Food and drinks  The main goal while treating vomiting or diarrhea is to prevent dehydration. This is done by taking small amounts of liquids often.    Keep in mind that liquids are more important than food right now.    Drink only small amounts of liquids at a time.    Don t force yourself to eat, especially if you are having cramping, vomiting, or diarrhea. Don t eat large amounts at a time, even if you are hungry.    If you eat, avoid fatty, greasy, spicy, or fried foods.    Don t eat dairy foods or drink milk if you have diarrhea. These can make diarrhea worse.  During the first 24 hours you can try:    Oral  rehydration solutions.  Sports drinks may be used if you are not too dehydrated and are otherwise healthy.    Soft drinks without caffeine    Ginger ale    Water (plain or flavored)    Decaf tea or coffee    Clear broth, consommé, or bouillon    Gelatin, popsicles, or frozen fruit juice bars  The second 24 hours, if you are feeling better, you can add:    Hot cereal, plain toast, bread, rolls, or crackers    Plain noodles, rice, mashed potatoes, chicken noodle soup, or rice soup    Unsweetened canned fruit (no pineapple)    Bananas  As you recover:    Limit fat intake to less than 15 grams per day. Don t eat margarine, butter, oils, mayonnaise, sauces, gravies, fried foods, peanut butter, meat, poultry, or fish.    Limit fiber. Don t eat raw or cooked vegetables, fresh fruits except bananas, or bran cereals.    Limit caffeine and chocolate.    Limit dairy.    Don t use spices or seasonings except salt.    Go back to your normal diet over time, as you feel better and your symptoms improve.    If the symptoms come back, go back to a simple diet or clear liquids.  Follow-up care  Follow up with your healthcare provider, or as advised. If a stool sample was taken or cultures were done, call the healthcare provider for the results as instructed.  Call 911  Call 911 if you have any of these symptoms:    Trouble breathing    Confusion    Extreme drowsiness or trouble walking    Loss of consciousness    Rapid heart rate    Chest pain    Stiff neck    Seizure  When to seek medical advice  Call your healthcare provider right away if any of these occur:    Abdominal pain that gets worse    Constant lower right abdominal pain    Continued vomiting and inability to keep liquids down    Diarrhea more than 5 times a day    Blood in vomit or stool    Dark urine or no urine for 8 hours, dry mouth and tongue, tiredness, weakness, or dizziness    Drowsiness    New rash    You don t get better in 2 to 3 days    Fever of 100.4 F (38 C)  or higher, or as directed by your healthcare provider  Date Last Reviewed: 6/1/2018 2000-2018 [employer]. [address] [facility], [place] [address]. All rights reserved. This information is not intended as a substitute for professional medical care. Always follow your healthcare professional's instructions.               Return in about 2 weeks (around 7/5/2019), or if symptoms worsen or fail to improve.    Elin Arboleda NP  [facility]

## 2019-06-21 NOTE — PATIENT INSTRUCTIONS
Blood counts were normal   One more test pending   Stick with bland diet   Will check stool specimens for infection     Patient Education     Diarrhea with Uncertain Cause (Adult)    Diarrhea is when stools are loose and watery. This can be caused by:    Viral infections    Bacterial infections    Food poisoning    Parasites    Irritable bowel syndrome (IBS)    Inflammatory bowel diseases such as ulcerative colitis, Crohn's disease, and celiac disease    Food intolerance, such as to lactose, the sugar found in milk and milk products    Reaction to medicines like antibiotics, laxatives, cancer drugs, and antacids  Along with diarrhea, you may also have:    Abdominal pain and cramping    Nausea and vomiting    Loss of bowel control    Fever and chills    Bloody stools  In some cases, antibiotics may help to treat diarrhea. You may have a stool sample test. This is done to see what is causing your diarrhea, and if antibiotics will help treat it. The results of a stool sample test may take up to 2 days. The healthcare provider may not give you antibiotics until he or she has the stool test results.  Diarrhea can cause dehydration. This is the loss of too much water and other fluids from the body. When this occurs, body fluid must be replaced. This can be done with oral rehydration solutions. Oral rehydration solutions are available at drugstores and grocery stores without a prescription. Sports drinks are not the best choice if you are very dehydrated. They have too much sugar and not enough electrolytes.  Home care  Follow all instructions given by your healthcare provider. Rest at home for the next 24 hours, or until you feel better. Avoid caffeine, tobacco, and alcohol. These can make diarrhea, cramping, and pain worse.  If taking medicines:    Over-the-counter nausea and diarrhea medicines are generally OK unless you experience fever or blood stool. Check with your doctor first in those circumstances.    You may use  acetaminophen or NSAID medicines like ibuprofen or naproxen to reduce pain and fever. Don t use these if you have chronic liver or kidney disease, or ever had a stomach ulcer or gastrointestinal bleeding. Don't use NSAID medicines if you are already taking one for another condition (like arthritis) or are on daily aspirin therapy (such as for heart disease or after a stroke). Talk with your healthcare provider first.    If antibiotics were prescribed, be sure you take them until they are finished. Don t stop taking them even when you feel better. Antibiotics must be taken as a full course.  To prevent the spread of illness:    Remember that washing with soap and water and using alcohol-based  is the best way to prevent the spread of infection. Dry your hands with a single use towel (like a paper towel).    Clean the toilet after each use.    Wash your hands before eating.    Wash your hands before and after preparing food. Keep in mind that people with diarrhea or vomiting should not prepare food for others.    Wash your hands after using cutting boards, countertops, and knives that have been in contact with raw foods.    Wash and then peel fruits and vegetables.    Keep uncooked meats away from cooked and ready-to-eat foods.    Use a food thermometer when cooking. Cook poultry to at least 165 F (74 C). Cook ground meat (beef, veal, pork, lamb) to at least 160 F (71 C). Cook fresh beef, veal, lamb, and pork to at least 145 F (63 C).    Don t eat raw or undercooked eggs (poached or meghan side up), poultry, meat, or unpasteurized milk and juices.  Food and drinks  The main goal while treating vomiting or diarrhea is to prevent dehydration. This is done by taking small amounts of liquids often.    Keep in mind that liquids are more important than food right now.    Drink only small amounts of liquids at a time.    Don t force yourself to eat, especially if you are having cramping, vomiting, or diarrhea. Don t  eat large amounts at a time, even if you are hungry.    If you eat, avoid fatty, greasy, spicy, or fried foods.    Don t eat dairy foods or drink milk if you have diarrhea. These can make diarrhea worse.  During the first 24 hours you can try:    Oral rehydration solutions.  Sports drinks may be used if you are not too dehydrated and are otherwise healthy.    Soft drinks without caffeine    Ginger ale    Water (plain or flavored)    Decaf tea or coffee    Clear broth, consommé, or bouillon    Gelatin, popsicles, or frozen fruit juice bars  The second 24 hours, if you are feeling better, you can add:    Hot cereal, plain toast, bread, rolls, or crackers    Plain noodles, rice, mashed potatoes, chicken noodle soup, or rice soup    Unsweetened canned fruit (no pineapple)    Bananas  As you recover:    Limit fat intake to less than 15 grams per day. Don t eat margarine, butter, oils, mayonnaise, sauces, gravies, fried foods, peanut butter, meat, poultry, or fish.    Limit fiber. Don t eat raw or cooked vegetables, fresh fruits except bananas, or bran cereals.    Limit caffeine and chocolate.    Limit dairy.    Don t use spices or seasonings except salt.    Go back to your normal diet over time, as you feel better and your symptoms improve.    If the symptoms come back, go back to a simple diet or clear liquids.  Follow-up care  Follow up with your healthcare provider, or as advised. If a stool sample was taken or cultures were done, call the healthcare provider for the results as instructed.  Call 911  Call 911 if you have any of these symptoms:    Trouble breathing    Confusion    Extreme drowsiness or trouble walking    Loss of consciousness    Rapid heart rate    Chest pain    Stiff neck    Seizure  When to seek medical advice  Call your healthcare provider right away if any of these occur:    Abdominal pain that gets worse    Constant lower right abdominal pain    Continued vomiting and inability to keep liquids  down    Diarrhea more than 5 times a day    Blood in vomit or stool    Dark urine or no urine for 8 hours, dry mouth and tongue, tiredness, weakness, or dizziness    Drowsiness    New rash    You don t get better in 2 to 3 days    Fever of 100.4 F (38 C) or higher, or as directed by your healthcare provider  Date Last Reviewed: 6/1/2018 2000-2018 [employer]. [address] [place] [address], [place] [address]. All rights reserved. This information is not intended as a substitute for professional medical care. Always follow your healthcare professional's instructions.

## 2019-06-23 PROCEDURE — 87493 C DIFF AMPLIFIED PROBE: CPT | Mod: 59 | Performed by: NURSE PRACTITIONER

## 2019-06-23 PROCEDURE — 87506 IADNA-DNA/RNA PROBE TQ 6-11: CPT | Performed by: NURSE PRACTITIONER

## 2019-06-23 PROCEDURE — 83630 LACTOFERRIN FECAL (QUAL): CPT | Performed by: NURSE PRACTITIONER

## 2019-06-23 PROCEDURE — 87177 OVA AND PARASITES SMEARS: CPT | Performed by: NURSE PRACTITIONER

## 2019-06-23 PROCEDURE — 87209 SMEAR COMPLEX STAIN: CPT | Performed by: NURSE PRACTITIONER

## 2019-06-24 DIAGNOSIS — R19.7 DIARRHEA, UNSPECIFIED TYPE: ICD-10-CM

## 2019-06-24 LAB
C DIFF TOX B STL QL: NEGATIVE
LACTOFERRIN STL QL IA: NEGATIVE
SPECIMEN SOURCE: NORMAL

## 2019-06-25 LAB
C COLI+JEJUNI+LARI FUSA STL QL NAA+PROBE: NOT DETECTED
EC STX1 GENE STL QL NAA+PROBE: NOT DETECTED
EC STX2 GENE STL QL NAA+PROBE: NOT DETECTED
ENTERIC PATHOGEN COMMENT: NORMAL
NOROV GI+II ORF1-ORF2 JNC STL QL NAA+PR: NOT DETECTED
O+P STL MICRO: NORMAL
O+P STL MICRO: NORMAL
RVA NSP5 STL QL NAA+PROBE: NOT DETECTED
SALMONELLA SP RPOD STL QL NAA+PROBE: NOT DETECTED
SHIGELLA SP+EIEC IPAH STL QL NAA+PROBE: NOT DETECTED
SPECIMEN SOURCE: NORMAL
V CHOL+PARA RFBL+TRKH+TNAA STL QL NAA+PR: NOT DETECTED
Y ENTERO RECN STL QL NAA+PROBE: NOT DETECTED

## 2019-10-21 ENCOUNTER — IMMUNIZATION (OUTPATIENT)
Dept: FAMILY MEDICINE | Facility: CLINIC | Age: 59
End: 2019-10-21
Payer: COMMERCIAL

## 2019-10-21 PROCEDURE — 90471 IMMUNIZATION ADMIN: CPT

## 2019-10-21 PROCEDURE — 90682 RIV4 VACC RECOMBINANT DNA IM: CPT

## 2019-10-24 ENCOUNTER — OFFICE VISIT (OUTPATIENT)
Dept: FAMILY MEDICINE | Facility: CLINIC | Age: 59
End: 2019-10-24
Payer: COMMERCIAL

## 2019-10-24 VITALS
SYSTOLIC BLOOD PRESSURE: 146 MMHG | DIASTOLIC BLOOD PRESSURE: 90 MMHG | WEIGHT: 142 LBS | HEIGHT: 60 IN | BODY MASS INDEX: 27.88 KG/M2 | HEART RATE: 64 BPM | TEMPERATURE: 97.2 F | RESPIRATION RATE: 18 BRPM

## 2019-10-24 DIAGNOSIS — R21 RASH AND NONSPECIFIC SKIN ERUPTION: Primary | ICD-10-CM

## 2019-10-24 LAB
BASOPHILS # BLD AUTO: 0 10E9/L (ref 0–0.2)
BASOPHILS NFR BLD AUTO: 0.3 %
DIFFERENTIAL METHOD BLD: NORMAL
EOSINOPHIL # BLD AUTO: 0.7 10E9/L (ref 0–0.7)
EOSINOPHIL NFR BLD AUTO: 7.5 %
ERYTHROCYTE [DISTWIDTH] IN BLOOD BY AUTOMATED COUNT: 12.7 % (ref 10–15)
ERYTHROCYTE [SEDIMENTATION RATE] IN BLOOD BY WESTERGREN METHOD: 16 MM/H (ref 0–30)
HCT VFR BLD AUTO: 39.5 % (ref 35–47)
HGB BLD-MCNC: 12.6 G/DL (ref 11.7–15.7)
LYMPHOCYTES # BLD AUTO: 2 10E9/L (ref 0.8–5.3)
LYMPHOCYTES NFR BLD AUTO: 20.9 %
MCH RBC QN AUTO: 28.6 PG (ref 26.5–33)
MCHC RBC AUTO-ENTMCNC: 31.9 G/DL (ref 31.5–36.5)
MCV RBC AUTO: 90 FL (ref 78–100)
MONOCYTES # BLD AUTO: 0.7 10E9/L (ref 0–1.3)
MONOCYTES NFR BLD AUTO: 7.5 %
NEUTROPHILS # BLD AUTO: 6.2 10E9/L (ref 1.6–8.3)
NEUTROPHILS NFR BLD AUTO: 63.8 %
PLATELET # BLD AUTO: 338 10E9/L (ref 150–450)
RBC # BLD AUTO: 4.4 10E12/L (ref 3.8–5.2)
WBC # BLD AUTO: 9.7 10E9/L (ref 4–11)

## 2019-10-24 PROCEDURE — 85025 COMPLETE CBC W/AUTO DIFF WBC: CPT | Performed by: FAMILY MEDICINE

## 2019-10-24 PROCEDURE — 85652 RBC SED RATE AUTOMATED: CPT | Performed by: FAMILY MEDICINE

## 2019-10-24 PROCEDURE — 36415 COLL VENOUS BLD VENIPUNCTURE: CPT | Performed by: FAMILY MEDICINE

## 2019-10-24 PROCEDURE — 99213 OFFICE O/P EST LOW 20 MIN: CPT | Performed by: FAMILY MEDICINE

## 2019-10-24 RX ORDER — PREDNISONE 20 MG/1
TABLET ORAL
Qty: 20 TABLET | Refills: 0 | Status: SHIPPED | OUTPATIENT
Start: 2019-10-24 | End: 2020-05-05

## 2019-10-24 RX ORDER — CLOBETASOL PROPIONATE 0.5 MG/G
OINTMENT TOPICAL 2 TIMES DAILY
Qty: 60 G | Refills: 1 | Status: SHIPPED | OUTPATIENT
Start: 2019-10-24

## 2019-10-24 ASSESSMENT — MIFFLIN-ST. JEOR: SCORE: 1134.58

## 2019-10-24 NOTE — NURSING NOTE
"Chief Complaint   Patient presents with     Derm Problem     BP (!) 146/90 (Cuff Size: Adult Regular)   Pulse 64   Temp 97.2  F (36.2  C) (Tympanic)   Resp 18   Ht 1.53 m (5' 0.25\")   Wt 64.4 kg (142 lb)   Breastfeeding? No   BMI 27.50 kg/m   Estimated body mass index is 27.5 kg/m  as calculated from the following:    Height as of this encounter: 1.53 m (5' 0.25\").    Weight as of this encounter: 64.4 kg (142 lb).  Patient presents to the clinic using No DME      Health Maintenance that is potentially due pending provider review:    Health Maintenance Due   Topic Date Due     EYE EXAM  1960     ZOSTER IMMUNIZATION (1 of 2) 10/14/2008     PREVENTIVE CARE VISIT  05/01/2018     FIT  08/21/2019     A1C  10/22/2019                "

## 2019-10-24 NOTE — PROGRESS NOTES
SUBJECTIVE   Jen Benedict is a 59 year old female who presents with     Rash      Duration: 2 weeks     Description  Location: both legs   Itching: moderate    Intensity:  moderate    Accompanying signs and symptoms: None    History (similar episodes/previous evaluation): None    Precipitating or alleviating factors:  [place] exposures[place]  [place]   Recent travel: no      Therapies tried and outcome: calamine lotion and peroxide        PCP   Elin Arboleda [phone]    Health Maintenance        Health Maintenance Due   Topic Date Due     EYE EXAM  1960     ZOSTER IMMUNIZATION (1 of 2) 10/14/2008     PREVENTIVE CARE VISIT  05/01/2018     FIT  08/21/2019     A1C  10/22/2019       HPI        Patient Active Problem List   Diagnosis     Advanced directives, counseling/discussion     CARDIOVASCULAR SCREENING; LDL GOAL LESS THAN 160     Family history of rheumatoid arthritis     Accessory skin tags     Osteopenia     Type 2 diabetes mellitus without complication, without long-term current use of insulin (H)     Essential hypertension     Current Outpatient Medications   Medication     ACE/ARB/ARNI NOT PRESCRIBED (INTENTIONAL)     atorvastatin (LIPITOR) 20 MG tablet     blood glucose (NO BRAND SPECIFIED) lancets standard     blood glucose (NO BRAND SPECIFIED) lancets standard     blood glucose (NO BRAND SPECIFIED) lancets standard     blood glucose (NO BRAND SPECIFIED) test strip     blood glucose (NO BRAND SPECIFIED) test strip     blood glucose monitoring (NO BRAND SPECIFIED) meter device kit     blood glucose monitoring (NO BRAND SPECIFIED) meter device kit     blood glucose monitoring (NO BRAND SPECIFIED) test strip     Calcium Carbonate-Vitamin D (CALCIUM + D PO)     DiphenhydrAMINE HCl (BENADRYL PO)     multivitamin, therapeutic with minerals (MULTI-VITAMIN) TABS tablet     omega 3 1000 MG CAPS     No current facility-administered medications for this visit.         Patient Active Problem List   Diagnosis     Advanced directives, counseling/discussion     CARDIOVASCULAR SCREENING; LDL GOAL LESS THAN 160     Family history of rheumatoid arthritis     Accessory skin tags     Osteopenia     Type 2 diabetes mellitus without complication, without long-term current use of insulin (H)     Essential hypertension     Past Surgical History:   Procedure Laterality Date     HYSTERECTOMY TOTAL ABDOMINAL, BILATERAL SALPINGO-OOPHORECTOMY, COMBINED  2002    non-cancer related       Social History     Tobacco Use     Smoking status: Never Smoker     Smokeless tobacco: Never Used   Substance Use Topics     Alcohol use: No     Family History   Problem Relation Age of Onset     Hypertension Mother      Diabetes Mother      Rheumatoid Arthritis Mother      Diabetes Sister         Type I         Current Outpatient Medications   Medication Sig Dispense Refill     ACE/ARB/ARNI NOT PRESCRIBED (INTENTIONAL) Please choose reason not prescribed, below       atorvastatin (LIPITOR) 20 MG tablet Take 1 tablet (20 mg) by mouth every evening 90 tablet 1     blood glucose (NO BRAND SPECIFIED) lancets standard Use to test blood sugar  daily 100 each 0     blood glucose (NO BRAND SPECIFIED) lancets standard Use to test blood sugar one time daily or as directed. 100 each 1     blood glucose (NO BRAND SPECIFIED) lancets standard Use to test blood sugar daily or as directed. 100 each 1     blood glucose (NO BRAND SPECIFIED) test strip Use to test blood sugar daily 100 strip 0     blood glucose (NO BRAND SPECIFIED) test strip Use to test blood sugar one time daily or as directed. 100 each 1     blood glucose monitoring (NO BRAND SPECIFIED) meter device kit Use to test blood sugar 1 time daily 1 kit 0     blood glucose monitoring (NO BRAND SPECIFIED) meter device kit Use to test blood sugar daily or as directed. 1 kit 0     blood glucose monitoring (NO BRAND SPECIFIED) test strip Use to test blood sugars daily  "or as directed 100 strip 3     Calcium Carbonate-Vitamin D (CALCIUM + D PO) Take 1,200 mg by mouth daily        DiphenhydrAMINE HCl (BENADRYL PO)        multivitamin, therapeutic with minerals (MULTI-VITAMIN) TABS tablet Take 1 tablet by mouth daily       omega 3 1000 MG CAPS Take 1 g by mouth 2 times daily 90 capsule      Allergies   Allergen Reactions     Lisinopril Cough     Losartan Cough     Pollen Extract      Recent Labs   Lab Test 06/21/19  0814 04/22/19  0827 08/20/18  0907 04/20/18  0910 11/27/17  0825  05/01/17  0830   A1C  --  6.5* 6.6* 6.4* 6.3*   < > 6.7*   LDL  --  182*  --  163* 158*   < > 149*   HDL  --  62  --  60 79   < > 63   TRIG  --  129  --  112 103   < > 140   ALT 32  --   --   --   --   --  41   CR 0.56  --  0.62  --  0.58   < > 0.60   GFRESTIMATED >90  --  >90  --  >90   < > >90  Non  GFR Calc     GFRESTBLACK >90  --  >90  --  >90   < > >90  African American GFR Calc     POTASSIUM 4.1  --  4.6  --  4.3   < > 4.3   TSH  --   --  0.61  --  0.52  --   --     < > = values in this interval not displayed.      BP Readings from Last 3 Encounters:   10/24/19 (!) 146/90   06/21/19 128/78   04/22/19 135/80    Wt Readings from Last 3 Encounters:   10/24/19 64.4 kg (142 lb)   06/21/19 62.7 kg (138 lb 3.2 oz)   04/22/19 63 kg (139 lb)                    Reviewed and updated:  Tobacco  Allergies  Meds  Med Hx  Surg Hx  Fam Hx  Soc Hx     ROS:  Constitutional, HEENT, cardiovascular, pulmonary, gi and gu systems are negative, except as otherwise noted.    PHYSICAL EXAM   BP (!) 146/90 (Cuff Size: Adult Regular)   Pulse 64   Temp 97.2  F (36.2  C) (Tympanic)   Resp 18   Ht 1.53 m (5' 0.25\")   Wt 64.4 kg (142 lb)   Breastfeeding? No   BMI 27.50 kg/m    Body mass index is 27.5 kg/m .  GENERAL: healthy, alert and no distress  NECK: no adenopathy, no asymmetry, masses, or scars and thyroid normal to palpation  RESP: lungs clear to auscultation - no rales, rhonchi or wheezes  CV: " regular rate and rhythm, normal S1 S2, no S3 or S4, no murmur, click or rub, no peripheral edema and peripheral pulses strong  ABDOMEN: soft, nontender, no hepatosplenomegaly, no masses and bowel sounds normal  MS: no gross musculoskeletal defects noted, no edema  SKIN: erythematous maculopapular rashes with some skin darkening involving lower legs bilaterally, no blistering or discharge noted                 Results for orders placed or performed in visit on 10/24/19   CBC with platelets and differential   Result Value Ref Range    WBC 9.7 4.0 - 11.0 10e9/L    RBC Count 4.40 3.8 - 5.2 10e12/L    Hemoglobin 12.6 11.7 - 15.7 g/dL    Hematocrit 39.5 35.0 - 47.0 %    MCV 90 78 - 100 fl    MCH 28.6 26.5 - 33.0 pg    MCHC 31.9 31.5 - 36.5 g/dL    RDW 12.7 10.0 - 15.0 %    Platelet Count 338 150 - 450 10e9/L    % Neutrophils 63.8 %    % Lymphocytes 20.9 %    % Monocytes 7.5 %    % Eosinophils 7.5 %    % Basophils 0.3 %    Absolute Neutrophil 6.2 1.6 - 8.3 10e9/L    Absolute Lymphocytes 2.0 0.8 - 5.3 10e9/L    Absolute Monocytes 0.7 0.0 - 1.3 10e9/L    Absolute Eosinophils 0.7 0.0 - 0.7 10e9/L    Absolute Basophils 0.0 0.0 - 0.2 10e9/L    Diff Method Automated Method    ESR: Erythrocyte sedimentation rate   Result Value Ref Range    Sed Rate 16 0 - 30 mm/h           Assessment & Plan     (R21) Rash and nonspecific skin eruption  (primary encounter diagnosis)  Comment: Differentials discussed in detail including poison ivy, vasculitis, ITP.  CBC and ESR normal.  Patient deferred biopsy.  Shared decision made to start prednisone and clobetasol for suspected poison ivy dermatitis.  Home care explained.  Follow-up if symptoms persist or worsen.  Patient understood and in agreement with above plan.  All questions answered.  Plan: predniSONE (DELTASONE) 20 MG tablet, clobetasol        (TEMOVATE) 0.05 % external ointment, CBC with         platelets and differential, ESR: Erythrocyte         sedimentation rate                [facility]  AVERY Delaney MD  [facility]

## 2019-12-16 ENCOUNTER — ANCILLARY PROCEDURE (OUTPATIENT)
Dept: MAMMOGRAPHY | Facility: CLINIC | Age: 59
End: 2019-12-16
Attending: NURSE PRACTITIONER
Payer: COMMERCIAL

## 2019-12-16 DIAGNOSIS — Z12.31 VISIT FOR SCREENING MAMMOGRAM: ICD-10-CM

## 2019-12-16 PROCEDURE — 77067 SCR MAMMO BI INCL CAD: CPT | Mod: TC

## 2020-01-20 ENCOUNTER — TRANSFERRED RECORDS (OUTPATIENT)
Dept: HEALTH INFORMATION MANAGEMENT | Facility: CLINIC | Age: 60
End: 2020-01-20

## 2020-03-20 ENCOUNTER — VIRTUAL VISIT (OUTPATIENT)
Dept: FAMILY MEDICINE | Facility: CLINIC | Age: 60
End: 2020-03-20
Payer: COMMERCIAL

## 2020-03-20 DIAGNOSIS — R05.3 CHRONIC COUGH: Primary | ICD-10-CM

## 2020-03-20 PROCEDURE — 99442 ZZC PHYSICIAN TELEPHONE EVALUATION 11-20 MIN: CPT | Performed by: NURSE PRACTITIONER

## 2020-03-20 RX ORDER — ALBUTEROL SULFATE 90 UG/1
2 AEROSOL, METERED RESPIRATORY (INHALATION) EVERY 6 HOURS PRN
Qty: 1 INHALER | Refills: 0 | Status: SHIPPED | OUTPATIENT
Start: 2020-03-20

## 2020-03-20 NOTE — PROGRESS NOTES
"Jen Benedict is a 59 year old female who is being evaluated via a billable telephone visit.      The patient has been notified of following:     \"This telephone visit will be conducted via a call between you and your physician/provider. We have found that certain health care needs can be provided without the need for a physical exam.  This service lets us provide the care you need with a short phone conversation.  If a prescription is necessary we can send it directly to your pharmacy.  If lab work is needed we can place an order for that and you can then stop by our lab to have the test done at a later time.    If during the course of the call the physician/provider feels a telephone visit is not appropriate, you will not be charged for this service.\"     Jen Benedict complains of    Chief Complaint   Patient presents with     Allergies       I have reviewed and updated the patient's Past Medical History, Social History, Family History and Medication List.    ALLERGIES  Lisinopril; Losartan; and Pollen extract    RESPIRATORY SYMPTOMS      Duration: 6 months    Description  nasal congestion, cough, fatigue/malaise and sneezing    Severity: moderate    Accompanying signs and symptoms: when she is fatigued she has a cough - when she coughs she gets lethargic    History (predisposing factors):  Has hx of bronchitis    Therapies tried and outcome:  Lemon tea   Working 12 hour shifts due to shortage of staff    No fever  No recent travel    Patient is request a not stating that she is unable to work 12 hour shifts at the [employer] due to chronic cough and fatigue   She has not been worked up for this cough         Assessment/Plan:  1. Chronic cough  I had a long discussion with patient that this needs further work up   Her main reason for visit is wanting a letter say she can not work overtime with the COVID 19   I discussed that in light of the health care crisis with COVID 19 her services are needed and I cannot " write this letter   I have prescribed an albuterol inhaler and schedule a follow up with me     - albuterol (PROAIR HFA/PROVENTIL HFA/VENTOLIN HFA) 108 (90 Base) MCG/ACT inhaler; Inhale 2 puffs into the lungs every 6 hours as needed for shortness of breath / dyspnea or wheezing  Dispense: 1 Inhaler; Refill: 0    Phone call duration:   15 minutes    Elin Arboleda NP

## 2020-05-05 ENCOUNTER — VIRTUAL VISIT (OUTPATIENT)
Dept: FAMILY MEDICINE | Facility: CLINIC | Age: 60
End: 2020-05-05
Payer: COMMERCIAL

## 2020-05-05 DIAGNOSIS — R21 RASH AND NONSPECIFIC SKIN ERUPTION: ICD-10-CM

## 2020-05-05 DIAGNOSIS — R05.3 CHRONIC COUGH: Primary | ICD-10-CM

## 2020-05-05 PROCEDURE — 99213 OFFICE O/P EST LOW 20 MIN: CPT | Mod: 95 | Performed by: NURSE PRACTITIONER

## 2020-05-05 RX ORDER — PREDNISONE 20 MG/1
TABLET ORAL
Qty: 20 TABLET | Refills: 0 | Status: SHIPPED | OUTPATIENT
Start: 2020-05-05

## 2020-05-05 NOTE — PROGRESS NOTES
"Jen Benedict is a 59 year old female who is being evaluated via a billable telephone visit.      The patient has been notified of following:     \"This telephone visit will be conducted via a call between you and your physician/provider. We have found that certain health care needs can be provided without the need for a physical exam.  This service lets us provide the care you need with a short phone conversation.  If a prescription is necessary we can send it directly to your pharmacy.  If lab work is needed we can place an order for that and you can then stop by our lab to have the test done at a later time.    Telephone visits are billed at different rates depending on your insurance coverage. During this emergency period, for some insurers they may be billed the same as an in-person visit.  Please reach out to your insurance provider with any questions.    If during the course of the call the physician/provider feels a telephone visit is not appropriate, you will not be charged for this service.\"    Patient has given verbal consent for Telephone visit?  Yes    What phone number would you like to be contacted at? [phone]     How would you like to obtain your AVS? MyChart    Subjective     Jen Benedict is a 59 year old female who presents to clinic today for the following health issues:    HPI  Rash      Duration: 1 day     Description  Location: Rt arm  Itching: severe    Intensity:  Spreading     Accompanying signs and symptoms: raised red round spots     History (similar episodes/previous evaluation): \"I get poison     oak easily\" could be poison oak or poison ivy.  Needs prednisone.  This usually takes care of it.  Using Caladryl for itching    Precipitating or alleviating factors:  New exposures:  Working and spending time outdoors   Recent travel: no      Therapies tried and outcome: washed with alcohol and calamine lotion    ASTHMA -Worse lately   Uses albuterol intermittently.  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  Wants to have PFT done after COVID   [place]  [place] animals.  Gardens.    Type 2 diabetes.  Last A1c 1 year ago  Checks blood sugars intermittently.  Typically less than 100 fasting.  Less than 142 hours postprandial               -------------------------------------    Patient Active Problem List   Diagnosis     Advanced directives, counseling/discussion     CARDIOVASCULAR SCREENING; LDL GOAL LESS THAN 160     Family history of rheumatoid arthritis     Accessory skin tags     Osteopenia     Type 2 diabetes mellitus without complication, without long-term current use of insulin (H)     Essential hypertension     Past Surgical History:   Procedure Laterality Date     HYSTERECTOMY TOTAL ABDOMINAL, BILATERAL SALPINGO-OOPHORECTOMY, COMBINED  2002    non-cancer related       Social History     Tobacco Use     Smoking status: Never Smoker     Smokeless tobacco: Never Used   Substance Use Topics     Alcohol use: No     Family History   Problem Relation Age of Onset     Hypertension Mother      Diabetes Mother      Rheumatoid Arthritis Mother      Diabetes Sister         Type I           Reviewed and updated as needed this visit by Provider         Review of Systems   ROS COMP: Constitutional, HEENT, cardiovascular, pulmonary, GI, , musculoskeletal, neuro, skin, endocrine and psych systems are negative, except as otherwise noted.       Objective   Reported vitals:  There were no vitals taken for this visit.   alert, no distress and cooperative  PSYCH: Alert and oriented times 3; coherent speech, normal   rate and volume, able to articulate logical thoughts, able   to abstract reason, no tangential thoughts, no hallucinations   or delusions  Her affect is normal  RESP: No cough, no audible wheezing, able to talk in full sentences  Remainder of exam unable to be completed due to telephone visits    Diagnostic Test  Results:  Labs reviewed in Epic        Assessment/Plan:  1. Rash and nonspecific skin eruption  Symptomatic care strategies are reviewed.  Avoidance strategies are reviewed  Continue current topical agents  Zyrtec 10 mg daily recommended  Begin  - predniSONE (DELTASONE) 20 MG tablet; Take 3 tabs by mouth daily x 3 days, then 2 tabs daily x 3 days, then 1 tab daily x 3 days, then 1/2 tab daily x 3 days.  Dispense: 20 tablet; Refill: 0    2.  Chronic cough/bronchospasm  Pulmonary function test ordered      Recommend return to the clinic for fasting labs for diabetes and visit with PCP in the near future  Consider pulmonary function tests.  Future order placed.  Bronchospasm/chronic cough    Call or return to the clinic with any worsening of symptoms or no resolution. Patient/Parent verbalized understanding and is in agreement. Medication side effects reviewed.   Current Outpatient Medications   Medication Sig Dispense Refill     ACE/ARB/ARNI NOT PRESCRIBED (INTENTIONAL) Please choose reason not prescribed, below       albuterol (PROAIR HFA/PROVENTIL HFA/VENTOLIN HFA) 108 (90 Base) MCG/ACT inhaler Inhale 2 puffs into the lungs every 6 hours as needed for shortness of breath / dyspnea or wheezing 1 Inhaler 0     Calcium Carbonate-Vitamin D (CALCIUM + D PO) Take 1,200 mg by mouth daily        multivitamin, therapeutic with minerals (MULTI-VITAMIN) TABS tablet Take 1 tablet by mouth daily       omega 3 1000 MG CAPS Take 1 g by mouth 2 times daily 90 capsule      predniSONE (DELTASONE) 20 MG tablet Take 3 tabs by mouth daily x 3 days, then 2 tabs daily x 3 days, then 1 tab daily x 3 days, then 1/2 tab daily x 3 days. 20 tablet 0     atorvastatin (LIPITOR) 20 MG tablet Take 1 tablet (20 mg) by mouth every evening 90 tablet 1     blood glucose (NO BRAND SPECIFIED) lancets standard Use to test blood sugar  daily 100 each 0     blood glucose (NO BRAND SPECIFIED) lancets standard Use to test blood sugar one time daily or as  directed. 100 each 1     blood glucose (NO BRAND SPECIFIED) lancets standard Use to test blood sugar daily or as directed. 100 each 1     blood glucose (NO BRAND SPECIFIED) test strip Use to test blood sugar daily 100 strip 0     blood glucose (NO BRAND SPECIFIED) test strip Use to test blood sugar one time daily or as directed. 100 each 1     blood glucose monitoring (NO BRAND SPECIFIED) meter device kit Use to test blood sugar 1 time daily 1 kit 0     blood glucose monitoring (NO BRAND SPECIFIED) meter device kit Use to test blood sugar daily or as directed. 1 kit 0     blood glucose monitoring (NO BRAND SPECIFIED) test strip Use to test blood sugars daily or as directed 100 strip 3     clobetasol (TEMOVATE) 0.05 % external ointment Apply topically 2 times daily 60 g 1     DiphenhydrAMINE HCl (BENADRYL PO)        Chart documentation with Dragon Voice recognition Software. Although reviewed after completion, some words and grammatical errors may remain.      Phone call duration: 6 minutes  As the provider for this telephone/video service, I attest that I introduced myself to the patient, provided my credentials, disclosed my location, and determined that, based on a review of the patient's chart and/or a discussion with members of the patient's treatment team, a telephone/video visit is an appropriate and effective means of providing this service. The patient and I mutually agree that this visit is appropriate for telephone/video visit as well.      Tayla Hughes MSN,FNP-[facility]  [id number] [place] [id number]  [phone]

## 2020-08-10 ENCOUNTER — HOSPITAL ENCOUNTER (OUTPATIENT)
Dept: RESPIRATORY THERAPY | Facility: CLINIC | Age: 60
Discharge: HOME | End: 2020-08-10
Attending: INTERNAL MEDICINE | Admitting: INTERNAL MEDICINE
Payer: COMMERCIAL

## 2020-08-10 DIAGNOSIS — R05.3 CHRONIC COUGH: ICD-10-CM

## 2020-08-10 PROCEDURE — 94060 EVALUATION OF WHEEZING: CPT

## 2020-08-10 PROCEDURE — 94060 EVALUATION OF WHEEZING: CPT | Mod: 26 | Performed by: INTERNAL MEDICINE

## 2020-08-10 PROCEDURE — 94729 DIFFUSING CAPACITY: CPT | Mod: 26 | Performed by: INTERNAL MEDICINE

## 2020-08-10 PROCEDURE — 94726 PLETHYSMOGRAPHY LUNG VOLUMES: CPT

## 2020-08-10 PROCEDURE — 94726 PLETHYSMOGRAPHY LUNG VOLUMES: CPT | Mod: 26 | Performed by: INTERNAL MEDICINE

## 2020-08-10 PROCEDURE — 94729 DIFFUSING CAPACITY: CPT

## 2020-08-10 NOTE — LETTER
August 21, 2020      Jen VICKI Jak  [address] [place] [address]        Dear ,    We are writing to inform you of your test results.    Normal Pulmonary function test.     Recommend follow up with Primary Care Provider for ongoing chronic cough for further evaluation.       If you have any questions or concerns, please call the clinic at the number listed above.       Sincerely,        Tayla Hughes CNP/veronica

## 2020-08-11 ENCOUNTER — NURSE TRIAGE (OUTPATIENT)
Dept: NURSING | Facility: CLINIC | Age: 60
End: 2020-08-11

## 2020-08-11 DIAGNOSIS — L23.7 CONTACT DERMATITIS DUE TO POISON IVY: Primary | ICD-10-CM

## 2020-08-11 RX ORDER — PREDNISONE 20 MG/1
TABLET ORAL
Qty: 20 TABLET | Refills: 0 | Status: SHIPPED | OUTPATIENT
Start: 2020-08-11

## 2020-08-11 NOTE — TELEPHONE ENCOUNTER
LM on identified VM informing of Rx, dose, directions and advising to schedule DM F/U.  Please call back with any questions or concerns.  KRISTY Swenson RN

## 2020-08-11 NOTE — TELEPHONE ENCOUNTER
"Clinic Action Needed:Yes, Please call patient [phone] (home)     Reason for Call:Patient calling requesting a refill of Prednisone.   Reporting symptoms of \"Poison [place]\" starting 1 week ago on right lower leg. Patient reporting patches of fluid filled \"clusters\" non draining, itchy. Symptoms similar to previous Poison [place] rash. Reporting she is typically prescribed prednisone. Afebrile.     Per protocol advised to be seen within 24 hours/Virtual Visit. Patient declines scheduling. Requesting message to PCP to request refill.  Prednisone last filled 5/5/20 20 mg.    Tamara Helton RN  [facility]        COVID 19 Nurse Triage Plan/Patient Instructions    Please be aware that novel coronavirus (COVID-19) may be circulating in the community. If you develop symptoms such as fever, cough, or SOB or if you have concerns about the presence of another infection including coronavirus (COVID-19), please contact your health care provider or visit [email or web address].     Disposition/Instructions    Virtual Visit with provider recommended. Reference Visit Selection Guide.    Thank you for taking steps to prevent the spread of this virus.  o Limit your contact with others.  o Wear a simple mask to cover your cough.  o Wash your hands well and often.    Resources    [facility]: About COVID-19: [email or web address]    CDC: What to Do If You're Sick: www.cdc.gov/coronavirus/2019-ncov/about/steps-when-sick.html    CDC: Ending Home Isolation: www.cdc.gov/coronavirus/2019-ncov/hcp/disposition-in-home-patients.html     CDC: Caring for Someone: www.cdc.gov/coronavirus/2019-ncov/if-you-are-sick/care-for-someone.html     [facility]: Interim Guidance for Hospital Discharge to Home: www.health.[facility]/diseases/coronavirus/hcp/hospdischarge.pdf    [facility] clinical trials (COVID-19 research studies): [email or web address]     Below are the COVID-19 hotlines at the [place] " Department of Health ([facility]). Interpreters are available.   o For health questions: Call 397-842-6778 or 1-321.992.6698 (7 a.m. to 7 p.m.)  o For questions about schools and childcare: Call 457-480-5663 or 1-610.499.3398 (7 a.m. to 7 p.m.)                     Additional Information    Negative: Doesn't match the SYMPTOMS of poison ivy, oak, or sumac    Negative: [1] Difficulty breathing or severe coughing AND [2] followed exposure to burning weeds    Negative: [1] Fever AND [2] bright red area or streak (from open poison ivy sores)    Negative: [1] Increasing redness around poison ivy AND [2] larger than 2 inches (5 cm)    Negative: [1] Severe poison ivy, oak, or sumac reaction in the past AND [2] face involved    Negative: SEVERE itching (e.g., interferes with sleep or normal activities)    Negative: Rash involves more than one-fourth of the body    Negative: Big blisters or oozing sores    Negative: [1] Face, eyes, lips or genitals are involved AND [2] more than a small rash    Negative: [1] Looks infected (e.g., soft yellow scabs, pus or spreading redness) AND [2] no fever    Severe poison ivy, oak, or sumac reaction in the past    Protocols used: POISON IVY - OAK - SUMAC-A-

## 2020-08-11 NOTE — TELEPHONE ENCOUNTER
Pt informed Gabbie has reviewed, advises virtual visit.  Offered appt today with Gabbie or tomorrow with another provider- states unable due to work schedule.  Does not understand why Gabbie cannot prescribe prednisone with know hx poison oak. Prednisone last prescribed 05-05-20 per Tayla ROA  Forwarded to Gabbie for review.  KRISTY Swenson RN

## 2020-08-11 NOTE — TELEPHONE ENCOUNTER
I sent a RX for prednisone taper. Please make sure she aware that this can increase her Blood sugars.  She is due for labs for her diabetes- she should schedule a virtual visit in the near future to address this and get lab orders as well.   Gabbie MURO

## 2020-08-20 LAB
DLCOUNC-%PRED-PRE: 93 %
DLCOUNC-PRE: 16.46 ML/MIN/MMHG
DLCOUNC-PRED: 17.67 ML/MIN/MMHG
ERV-%PRED-PRE: 60 %
ERV-PRE: 0.36 L
ERV-PRED: 0.61 L
EXPTIME-PRE: 5.26 SEC
FEF2575-%PRED-POST: 143 %
FEF2575-%PRED-PRE: 116 %
FEF2575-POST: 2.79 L/SEC
FEF2575-PRE: 2.26 L/SEC
FEF2575-PRED: 1.94 L/SEC
FEFMAX-%PRED-PRE: 126 %
FEFMAX-PRE: 7.18 L/SEC
FEFMAX-PRED: 5.68 L/SEC
FEV1-%PRED-PRE: 98 %
FEV1-PRE: 2 L
FEV1FEV6-PRE: 84 %
FEV1FEV6-PRED: 81 %
FEV1FVC-PRE: 84 %
FEV1FVC-PRED: 81 %
FEV1SVC-PRE: 84 %
FEV1SVC-PRED: 73 %
FIFMAX-PRE: 3.67 L/SEC
FRCPLETH-%PRED-PRE: 104 %
FRCPLETH-PRE: 2.6 L
FRCPLETH-PRED: 2.49 L
FVC-%PRED-PRE: 94 %
FVC-PRE: 2.38 L
FVC-PRED: 2.52 L
IC-%PRED-PRE: 92 %
IC-PRE: 2 L
IC-PRED: 2.17 L
RVPLETH-%PRED-PRE: 127 %
RVPLETH-PRE: 2.23 L
RVPLETH-PRED: 1.75 L
TLCPLETH-%PRED-PRE: 106 %
TLCPLETH-PRE: 4.6 L
TLCPLETH-PRED: 4.31 L
VA-%PRED-PRE: 90 %
VA-PRE: 3.71 L
VC-%PRED-PRE: 85 %
VC-PRE: 2.37 L
VC-PRED: 2.77 L

## 2020-08-23 ENCOUNTER — MYC MEDICAL ADVICE (OUTPATIENT)
Dept: FAMILY MEDICINE | Facility: CLINIC | Age: 60
End: 2020-08-23

## 2020-09-08 ENCOUNTER — IMMUNIZATION (OUTPATIENT)
Dept: FAMILY MEDICINE | Facility: CLINIC | Age: 60
End: 2020-09-08
Payer: COMMERCIAL

## 2020-09-08 DIAGNOSIS — Z23 NEED FOR PROPHYLACTIC VACCINATION AND INOCULATION AGAINST INFLUENZA: Primary | ICD-10-CM

## 2020-09-08 PROCEDURE — 90682 RIV4 VACC RECOMBINANT DNA IM: CPT

## 2020-09-08 PROCEDURE — 90471 IMMUNIZATION ADMIN: CPT

## 2020-10-05 ENCOUNTER — OFFICE VISIT (OUTPATIENT)
Dept: DERMATOLOGY | Facility: CLINIC | Age: 60
End: 2020-10-05
Payer: COMMERCIAL

## 2020-10-05 VITALS
SYSTOLIC BLOOD PRESSURE: 163 MMHG | OXYGEN SATURATION: 98 % | RESPIRATION RATE: 16 BRPM | DIASTOLIC BLOOD PRESSURE: 86 MMHG | HEART RATE: 82 BPM

## 2020-10-05 DIAGNOSIS — L82.1 DERMATOSIS PAPULOSA NIGRA: Primary | ICD-10-CM

## 2020-10-05 PROCEDURE — 96999 UNLISTED SPEC DERM SVC/PX: CPT | Performed by: DERMATOLOGY

## 2020-10-05 PROCEDURE — 99212 OFFICE O/P EST SF 10 MIN: CPT | Performed by: DERMATOLOGY

## 2020-10-05 NOTE — PROGRESS NOTES
Jen Benedict is a 59 year old year old female patient here today for dpn.  LOV did well.  Patient has no other skin complaints today.  Remainder of the HPI, Meds, PMH, Allergies, FH, and SH was reviewed in chart.    No past medical history on file.    Past Surgical History:   Procedure Laterality Date     HYSTERECTOMY TOTAL ABDOMINAL, BILATERAL SALPINGO-OOPHORECTOMY, COMBINED  2002    non-cancer related        Family History   Problem Relation Age of Onset     Hypertension Mother      Diabetes Mother      Rheumatoid Arthritis Mother      Diabetes Sister         Type I       Social History     Socioeconomic History     Marital status:      Spouse name: Not on file     Number of children: Not on file     Years of education: Not on file     Highest education level: Not on file   Occupational History     Not on file   Social Needs     Financial resource strain: Not on file     Food insecurity     Worry: Not on file     Inability: Not on file     Transportation needs     Medical: Not on file     Non-medical: Not on file   Tobacco Use     Smoking status: Never Smoker     Smokeless tobacco: Never Used   Substance and Sexual Activity     Alcohol use: No     Drug use: No     Sexual activity: Never   Lifestyle     Physical activity     Days per week: Not on file     Minutes per session: Not on file     Stress: Not on file   Relationships     Social connections     Talks on phone: Not on file     Gets together: Not on file     Attends Faith service: Not on file     Active member of club or organization: Not on file     Attends meetings of clubs or organizations: Not on file     Relationship status: Not on file     Intimate partner violence     Fear of current or ex partner: Not on file     Emotionally abused: Not on file     Physically abused: Not on file     Forced sexual activity: Not on file   Other Topics Concern     Parent/sibling w/ CABG, MI or angioplasty before 65F 55M? Not Asked   Social History  Narrative     Not on file       Outpatient Encounter Medications as of 10/5/2020   Medication Sig Dispense Refill     ACE/ARB/ARNI NOT PRESCRIBED (INTENTIONAL) Please choose reason not prescribed, below       albuterol (PROAIR HFA/PROVENTIL HFA/VENTOLIN HFA) 108 (90 Base) MCG/ACT inhaler Inhale 2 puffs into the lungs every 6 hours as needed for shortness of breath / dyspnea or wheezing 1 Inhaler 0     atorvastatin (LIPITOR) 20 MG tablet Take 1 tablet (20 mg) by mouth every evening 90 tablet 1     blood glucose (NO BRAND SPECIFIED) lancets standard Use to test blood sugar  daily 100 each 0     blood glucose (NO BRAND SPECIFIED) lancets standard Use to test blood sugar one time daily or as directed. 100 each 1     blood glucose (NO BRAND SPECIFIED) lancets standard Use to test blood sugar daily or as directed. 100 each 1     blood glucose (NO BRAND SPECIFIED) test strip Use to test blood sugar daily 100 strip 0     blood glucose (NO BRAND SPECIFIED) test strip Use to test blood sugar one time daily or as directed. 100 each 1     blood glucose monitoring (NO BRAND SPECIFIED) meter device kit Use to test blood sugar 1 time daily 1 kit 0     blood glucose monitoring (NO BRAND SPECIFIED) meter device kit Use to test blood sugar daily or as directed. 1 kit 0     blood glucose monitoring (NO BRAND SPECIFIED) test strip Use to test blood sugars daily or as directed 100 strip 3     Calcium Carbonate-Vitamin D (CALCIUM + D PO) Take 1,200 mg by mouth daily        clobetasol (TEMOVATE) 0.05 % external ointment Apply topically 2 times daily 60 g 1     DiphenhydrAMINE HCl (BENADRYL PO)        multivitamin, therapeutic with minerals (MULTI-VITAMIN) TABS tablet Take 1 tablet by mouth daily       omega 3 1000 MG CAPS Take 1 g by mouth 2 times daily 90 capsule      predniSONE (DELTASONE) 20 MG tablet Take 3 tabs by mouth daily x 3 days, then 2 tabs daily x 3 days, then 1 tab daily x 3 days, then 1/2 tab daily x 3 days. 20 tablet 0      predniSONE (DELTASONE) 20 MG tablet Take 3 tabs by mouth daily x 3 days, then 2 tabs daily x 3 days, then 1 tab daily x 3 days, then 1/2 tab daily x 3 days. 20 tablet 0     No facility-administered encounter medications on file as of 10/5/2020.              Review Of Systems  Skin: As above  Eyes: negative  Ears/Nose/Throat: negative  Respiratory: No shortness of breath, dyspnea on exertion, cough, or hemoptysis  Cardiovascular: negative  Gastrointestinal: negative  Genitourinary: negative  Musculoskeletal: negative  Neurologic: negative  Psychiatric: negative  Hematologic/Lymphatic/Immunologic: negative  Endocrine: negative      O:   NAD, WDWN, Alert & Oriented, Mood & Affect wnl, Vitals stable   Here today alone   BP (!) 163/86 (BP Location: Right arm, Patient Position: Sitting, Cuff Size: Adult Regular)   Pulse 82   Resp 16   SpO2 98%    General appearance normal   Vitals stable   Alert, oriented and in no acute distress     stuckon appule on right lower eyelid      Eyes: Conjunctivae/lids:Normal     ENT: Lips, buccal mucosa, tongue: normal    MSK:Normal    Cardiovascular: peripheral edema none    Pulm: Breathing Normal    Neuro/Psych: Orientation:Alert and Orientedx3 ; Mood/Affect:normal       A/P:  1. Dermatosis papularis nigra  cosemtic nature discussed with patient   R lower lid  20 lesions  TANGENTIAL EXCISION:  After consent, anesthesia with LEC and prep, tangential excision performed.  No complications and routine wound care.    BENIGN LESIONS DISCUSSED WITH PATIENT:  I discussed the specifics of tumor, prognosis, and genetics of benign lesions.  I explained that treatment of these lesions would be purely cosmetic and not medically neccessary.  I discussed with patient different removal options including excision, cautery and /or laser.      UV precautions reviewed with patient.  Skin care regimen reviewed with patient: Eliminate harsh soaps, i.e. Dial, zest, irsih spring; Mild soaps such as Cetaphil or  Dove sensitive skin, avoid hot or cold showers, aggressive use of emollients including vanicream, cetaphil or cerave discussed with patient.    Return to clinic prn

## 2020-10-05 NOTE — NURSING NOTE
"Initial BP (!) 163/86 (BP Location: Right arm, Patient Position: Sitting, Cuff Size: Adult Regular)   Pulse 82   Resp 16   SpO2 98%  Estimated body mass index is 27.5 kg/m  as calculated from the following:    Height as of 10/24/19: 1.53 m (5' 0.25\").    Weight as of 10/24/19: 64.4 kg (142 lb). .    Giselle Farrell, [facility]    "

## 2020-10-05 NOTE — LETTER
10/5/2020         RE: Jen Benedict  [id number] [facility] [address]        Dear Colleague,    Thank you for referring your patient, Jen Benedict, to the [facility]. Please see a copy of my visit note below.    Jen Benedict is a 59 year old year old female patient here today for dpn.  LOV did well.  Patient has no other skin complaints today.  Remainder of the HPI, Meds, PMH, Allergies, FH, and SH was reviewed in chart.    No past medical history on file.    Past Surgical History:   Procedure Laterality Date     HYSTERECTOMY TOTAL ABDOMINAL, BILATERAL SALPINGO-OOPHORECTOMY, COMBINED  2002    non-cancer related        Family History   Problem Relation Age of Onset     Hypertension Mother      Diabetes Mother      Rheumatoid Arthritis Mother      Diabetes Sister         Type I       Social History     Socioeconomic History     Marital status:      Spouse name: Not on file     Number of children: Not on file     Years of education: Not on file     Highest education level: Not on file   Occupational History     Not on file   Social Needs     Financial resource strain: Not on file     Food insecurity     Worry: Not on file     Inability: Not on file     Transportation needs     Medical: Not on file     Non-medical: Not on file   Tobacco Use     Smoking status: Never Smoker     Smokeless tobacco: Never Used   Substance and Sexual Activity     Alcohol use: No     Drug use: No     Sexual activity: Never   Lifestyle     Physical activity     Days per week: Not on file     Minutes per session: Not on file     Stress: Not on file   Relationships     Social connections     Talks on phone: Not on file     Gets together: Not on file     Attends Yazdanism service: Not on file     Active member of club or organization: Not on file     Attends meetings of clubs or organizations: Not on file     Relationship status: Not on file     Intimate partner violence     Fear of current  or ex partner: Not on file     Emotionally abused: Not on file     Physically abused: Not on file     Forced sexual activity: Not on file   Other Topics Concern     Parent/sibling w/ CABG, MI or angioplasty before 65F 55M? Not Asked   Social History Narrative     Not on file       Outpatient Encounter Medications as of 10/5/2020   Medication Sig Dispense Refill     ACE/ARB/ARNI NOT PRESCRIBED (INTENTIONAL) Please choose reason not prescribed, below       albuterol (PROAIR HFA/PROVENTIL HFA/VENTOLIN HFA) 108 (90 Base) MCG/ACT inhaler Inhale 2 puffs into the lungs every 6 hours as needed for shortness of breath / dyspnea or wheezing 1 Inhaler 0     atorvastatin (LIPITOR) 20 MG tablet Take 1 tablet (20 mg) by mouth every evening 90 tablet 1     blood glucose (NO BRAND SPECIFIED) lancets standard Use to test blood sugar  daily 100 each 0     blood glucose (NO BRAND SPECIFIED) lancets standard Use to test blood sugar one time daily or as directed. 100 each 1     blood glucose (NO BRAND SPECIFIED) lancets standard Use to test blood sugar daily or as directed. 100 each 1     blood glucose (NO BRAND SPECIFIED) test strip Use to test blood sugar daily 100 strip 0     blood glucose (NO BRAND SPECIFIED) test strip Use to test blood sugar one time daily or as directed. 100 each 1     blood glucose monitoring (NO BRAND SPECIFIED) meter device kit Use to test blood sugar 1 time daily 1 kit 0     blood glucose monitoring (NO BRAND SPECIFIED) meter device kit Use to test blood sugar daily or as directed. 1 kit 0     blood glucose monitoring (NO BRAND SPECIFIED) test strip Use to test blood sugars daily or as directed 100 strip 3     Calcium Carbonate-Vitamin D (CALCIUM + D PO) Take 1,200 mg by mouth daily        clobetasol (TEMOVATE) 0.05 % external ointment Apply topically 2 times daily 60 g 1     DiphenhydrAMINE HCl (BENADRYL PO)        multivitamin, therapeutic with minerals (MULTI-VITAMIN) TABS tablet Take 1 tablet by mouth  daily       omega 3 1000 MG CAPS Take 1 g by mouth 2 times daily 90 capsule      predniSONE (DELTASONE) 20 MG tablet Take 3 tabs by mouth daily x 3 days, then 2 tabs daily x 3 days, then 1 tab daily x 3 days, then 1/2 tab daily x 3 days. 20 tablet 0     predniSONE (DELTASONE) 20 MG tablet Take 3 tabs by mouth daily x 3 days, then 2 tabs daily x 3 days, then 1 tab daily x 3 days, then 1/2 tab daily x 3 days. 20 tablet 0     No facility-administered encounter medications on file as of 10/5/2020.              Review Of Systems  Skin: As above  Eyes: negative  Ears/Nose/Throat: negative  Respiratory: No shortness of breath, dyspnea on exertion, cough, or hemoptysis  Cardiovascular: negative  Gastrointestinal: negative  Genitourinary: negative  Musculoskeletal: negative  Neurologic: negative  Psychiatric: negative  Hematologic/Lymphatic/Immunologic: negative  Endocrine: negative      O:   NAD, WDWN, Alert & Oriented, Mood & Affect wnl, Vitals stable   Here today alone   BP (!) 163/86 (BP Location: Right arm, Patient Position: Sitting, Cuff Size: Adult Regular)   Pulse 82   Resp 16   SpO2 98%    General appearance normal   Vitals stable   Alert, oriented and in no acute distress     stuckon appule on right lower eyelid      Eyes: Conjunctivae/lids:Normal     ENT: Lips, buccal mucosa, tongue: normal    MSK:Normal    Cardiovascular: peripheral edema none    Pulm: Breathing Normal    Neuro/Psych: Orientation:Alert and Orientedx3 ; Mood/Affect:normal       A/P:  1. Dermatosis papularis nigra  cosemtic nature discussed with patient   R lower lid  20 lesions  TANGENTIAL EXCISION:  After consent, anesthesia with LEC and prep, tangential excision performed.  No complications and routine wound care.    BENIGN LESIONS DISCUSSED WITH PATIENT:  I discussed the specifics of tumor, prognosis, and genetics of benign lesions.  I explained that treatment of these lesions would be purely cosmetic and not medically neccessary.  I  discussed with patient different removal options including excision, cautery and /or laser.      UV precautions reviewed with patient.  Skin care regimen reviewed with patient: Eliminate harsh soaps, i.e. Dial, zest, irsih spring; Mild soaps such as Cetaphil or Dove sensitive skin, avoid hot or cold showers, aggressive use of emollients including vanicream, cetaphil or cerave discussed with patient.    Return to clinic prn         Again, thank you for allowing me to participate in the care of your patient.        Sincerely,        Vasyl Munoz MD

## 2020-12-20 ENCOUNTER — HEALTH MAINTENANCE LETTER (OUTPATIENT)
Age: 60
End: 2020-12-20

## 2020-12-31 ENCOUNTER — TELEPHONE (OUTPATIENT)
Dept: FAMILY MEDICINE | Facility: CLINIC | Age: 60
End: 2020-12-31

## 2020-12-31 NOTE — TELEPHONE ENCOUNTER
Reason for call:  Patient reporting a symptom    Symptom or request: Message from AFR - Per Pt she is a former pt of [facility] Worthington recommended Tayla VICTOR as a provider. Pt has a question: when she had her last Flu shot 9/8. She had swelling reaction on her eyes, mouth with hives on some parts of her body. Pt did Email [facility] Arboleda regarding this and was told not to get any Flu shots in the future. 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 Please Advise.      Phone Number patient can be reached at:  Home number on file [phone] (home)    Best Time:  Any Time      Can we leave a detailed message on this number:  YES    Call taken on 12/31/2020 at 1:54 PM by Sonia Asencio

## 2020-12-31 NOTE — TELEPHONE ENCOUNTER
09-10-20 Flynnt message reviewed.  LM on identified VM advising to scheduled est care/ DM, HTN appt with Tayla. Cn discuss COVID 19 vac with provider at that time.   KRISTY Swenson RN

## 2021-01-20 ENCOUNTER — OFFICE VISIT (OUTPATIENT)
Dept: DERMATOLOGY | Facility: CLINIC | Age: 61
End: 2021-01-20
Payer: COMMERCIAL

## 2021-01-20 VITALS — HEART RATE: 85 BPM | SYSTOLIC BLOOD PRESSURE: 159 MMHG | DIASTOLIC BLOOD PRESSURE: 83 MMHG | OXYGEN SATURATION: 97 %

## 2021-01-20 DIAGNOSIS — L82.1 DERMATOSIS PAPULOSA NIGRA: Primary | ICD-10-CM

## 2021-01-20 PROCEDURE — 99212 OFFICE O/P EST SF 10 MIN: CPT | Performed by: DERMATOLOGY

## 2021-01-20 PROCEDURE — 96999 UNLISTED SPEC DERM SVC/PX: CPT | Performed by: DERMATOLOGY

## 2021-01-20 NOTE — PROGRESS NOTES
Jen Benedict is an extremely pleasant 60 year old year old female patient here today for dpn lesions on eyelids and face.  Patient has no other skin complaints today.  Remainder of the HPI, Meds, PMH, Allergies, FH, and SH was reviewed in chart.    History reviewed. No pertinent past medical history.    Past Surgical History:   Procedure Laterality Date     HYSTERECTOMY TOTAL ABDOMINAL, BILATERAL SALPINGO-OOPHORECTOMY, COMBINED  2002    non-cancer related        Family History   Problem Relation Age of Onset     Hypertension Mother      Diabetes Mother      Rheumatoid Arthritis Mother      Diabetes Sister         Type I       Social History     Socioeconomic History     Marital status:      Spouse name: Not on file     Number of children: Not on file     Years of education: Not on file     Highest education level: Not on file   Occupational History     Not on file   Social Needs     Financial resource strain: Not on file     Food insecurity     Worry: Not on file     Inability: Not on file     Transportation needs     Medical: Not on file     Non-medical: Not on file   Tobacco Use     Smoking status: Never Smoker     Smokeless tobacco: Never Used   Substance and Sexual Activity     Alcohol use: No     Drug use: No     Sexual activity: Never   Lifestyle     Physical activity     Days per week: Not on file     Minutes per session: Not on file     Stress: Not on file   Relationships     Social connections     Talks on phone: Not on file     Gets together: Not on file     Attends Scientology service: Not on file     Active member of club or organization: Not on file     Attends meetings of clubs or organizations: Not on file     Relationship status: Not on file     Intimate partner violence     Fear of current or ex partner: Not on file     Emotionally abused: Not on file     Physically abused: Not on file     Forced sexual activity: Not on file   Other Topics Concern     Parent/sibling w/ CABG, MI or  angioplasty before 65F 55M? Not Asked   Social History Narrative     Not on file       Outpatient Encounter Medications as of 1/20/2021   Medication Sig Dispense Refill     ACE/ARB/ARNI NOT PRESCRIBED (INTENTIONAL) Please choose reason not prescribed, below       albuterol (PROAIR HFA/PROVENTIL HFA/VENTOLIN HFA) 108 (90 Base) MCG/ACT inhaler Inhale 2 puffs into the lungs every 6 hours as needed for shortness of breath / dyspnea or wheezing 1 Inhaler 0     atorvastatin (LIPITOR) 20 MG tablet Take 1 tablet (20 mg) by mouth every evening 90 tablet 1     blood glucose (NO BRAND SPECIFIED) lancets standard Use to test blood sugar  daily 100 each 0     blood glucose (NO BRAND SPECIFIED) lancets standard Use to test blood sugar one time daily or as directed. 100 each 1     blood glucose (NO BRAND SPECIFIED) lancets standard Use to test blood sugar daily or as directed. 100 each 1     blood glucose (NO BRAND SPECIFIED) test strip Use to test blood sugar daily 100 strip 0     blood glucose (NO BRAND SPECIFIED) test strip Use to test blood sugar one time daily or as directed. 100 each 1     blood glucose monitoring (NO BRAND SPECIFIED) meter device kit Use to test blood sugar 1 time daily 1 kit 0     blood glucose monitoring (NO BRAND SPECIFIED) meter device kit Use to test blood sugar daily or as directed. 1 kit 0     blood glucose monitoring (NO BRAND SPECIFIED) test strip Use to test blood sugars daily or as directed 100 strip 3     Calcium Carbonate-Vitamin D (CALCIUM + D PO) Take 1,200 mg by mouth daily        clobetasol (TEMOVATE) 0.05 % external ointment Apply topically 2 times daily 60 g 1     DiphenhydrAMINE HCl (BENADRYL PO)        multivitamin, therapeutic with minerals (MULTI-VITAMIN) TABS tablet Take 1 tablet by mouth daily       omega 3 1000 MG CAPS Take 1 g by mouth 2 times daily 90 capsule      predniSONE (DELTASONE) 20 MG tablet Take 3 tabs by mouth daily x 3 days, then 2 tabs daily x 3 days, then 1 tab daily  x 3 days, then 1/2 tab daily x 3 days. 20 tablet 0     predniSONE (DELTASONE) 20 MG tablet Take 3 tabs by mouth daily x 3 days, then 2 tabs daily x 3 days, then 1 tab daily x 3 days, then 1/2 tab daily x 3 days. (Patient not taking: Reported on 1/20/2021) 20 tablet 0     No facility-administered encounter medications on file as of 1/20/2021.              Review Of Systems  Skin: As above  Eyes: negative  Ears/Nose/Throat: negative  Respiratory: No shortness of breath, dyspnea on exertion, cough, or hemoptysis  Cardiovascular: negative  Gastrointestinal: negative  Genitourinary: negative  Musculoskeletal: negative  Neurologic: negative  Psychiatric: negative  Hematologic/Lymphatic/Immunologic: negative  Endocrine: negative      O:   NAD, WDWN, Alert & Oriented, Mood & Affect wnl, Vitals stable   Here today alone   BP (!) 159/83   Pulse 85   SpO2 97%    General appearance normal   Vitals stable   Alert, oriented and in no acute distress     Brown stuck on appule son eyelid sand face      Eyes: Conjunctivae/lids:Normal     ENT: Lips, buccal mucosa, tongue: normal    MSK:Normal    Cardiovascular: peripheral edema none    Pulm: Breathing Normal    Neuro/Psych: Orientation:Alert and Orientedx3 ; Mood/Affect:normal       A/P:  1. DPN  L cheek x5, L eyelid x5, R cheek x5  Cautery performed: After consent, anesthesia with LEC and prep, cautery performed.  No complications and routine wound care.    It was a pleasure speaking to Jen Benedict today.  BENIGN LESIONS DISCUSSED WITH PATIENT:  I discussed the specifics of tumor, prognosis, and genetics of benign lesions.  I explained that treatment of these lesions would be purely cosmetic and not medically neccessary.  I discussed with patient different removal options including excision, cautery and /or laser.      Cosmetic charge

## 2021-01-20 NOTE — LETTER
1/20/2021         RE: Jen Benedict  [address] [facility] [address]        Dear Colleague,    Thank you for referring your patient, Jen Benedict, to the [facility]. Please see a copy of my visit note below.    Jen Benedict is an extremely pleasant 60 year old year old female patient here today for dpn lesions on eyelids and face.  Patient has no other skin complaints today.  Remainder of the HPI, Meds, PMH, Allergies, FH, and SH was reviewed in chart.    History reviewed. No pertinent past medical history.    Past Surgical History:   Procedure Laterality Date     HYSTERECTOMY TOTAL ABDOMINAL, BILATERAL SALPINGO-OOPHORECTOMY, COMBINED  2002    non-cancer related        Family History   Problem Relation Age of Onset     Hypertension Mother      Diabetes Mother      Rheumatoid Arthritis Mother      Diabetes Sister         Type I       Social History     Socioeconomic History     Marital status:      Spouse name: Not on file     Number of children: Not on file     Years of education: Not on file     Highest education level: Not on file   Occupational History     Not on file   Social Needs     Financial resource strain: Not on file     Food insecurity     Worry: Not on file     Inability: Not on file     Transportation needs     Medical: Not on file     Non-medical: Not on file   Tobacco Use     Smoking status: Never Smoker     Smokeless tobacco: Never Used   Substance and Sexual Activity     Alcohol use: No     Drug use: No     Sexual activity: Never   Lifestyle     Physical activity     Days per week: Not on file     Minutes per session: Not on file     Stress: Not on file   Relationships     Social connections     Talks on phone: Not on file     Gets together: Not on file     Attends Druze service: Not on file     Active member of club or organization: Not on file     Attends meetings of clubs or organizations: Not on file     Relationship status: Not on  file     Intimate partner violence     Fear of current or ex partner: Not on file     Emotionally abused: Not on file     Physically abused: Not on file     Forced sexual activity: Not on file   Other Topics Concern     Parent/sibling w/ CABG, MI or angioplasty before 65F 55M? Not Asked   Social History Narrative     Not on file       Outpatient Encounter Medications as of 1/20/2021   Medication Sig Dispense Refill     ACE/ARB/ARNI NOT PRESCRIBED (INTENTIONAL) Please choose reason not prescribed, below       albuterol (PROAIR HFA/PROVENTIL HFA/VENTOLIN HFA) 108 (90 Base) MCG/ACT inhaler Inhale 2 puffs into the lungs every 6 hours as needed for shortness of breath / dyspnea or wheezing 1 Inhaler 0     atorvastatin (LIPITOR) 20 MG tablet Take 1 tablet (20 mg) by mouth every evening 90 tablet 1     blood glucose (NO BRAND SPECIFIED) lancets standard Use to test blood sugar  daily 100 each 0     blood glucose (NO BRAND SPECIFIED) lancets standard Use to test blood sugar one time daily or as directed. 100 each 1     blood glucose (NO BRAND SPECIFIED) lancets standard Use to test blood sugar daily or as directed. 100 each 1     blood glucose (NO BRAND SPECIFIED) test strip Use to test blood sugar daily 100 strip 0     blood glucose (NO BRAND SPECIFIED) test strip Use to test blood sugar one time daily or as directed. 100 each 1     blood glucose monitoring (NO BRAND SPECIFIED) meter device kit Use to test blood sugar 1 time daily 1 kit 0     blood glucose monitoring (NO BRAND SPECIFIED) meter device kit Use to test blood sugar daily or as directed. 1 kit 0     blood glucose monitoring (NO BRAND SPECIFIED) test strip Use to test blood sugars daily or as directed 100 strip 3     Calcium Carbonate-Vitamin D (CALCIUM + D PO) Take 1,200 mg by mouth daily        clobetasol (TEMOVATE) 0.05 % external ointment Apply topically 2 times daily 60 g 1     DiphenhydrAMINE HCl (BENADRYL PO)        multivitamin, therapeutic with minerals  (MULTI-VITAMIN) TABS tablet Take 1 tablet by mouth daily       omega 3 1000 MG CAPS Take 1 g by mouth 2 times daily 90 capsule      predniSONE (DELTASONE) 20 MG tablet Take 3 tabs by mouth daily x 3 days, then 2 tabs daily x 3 days, then 1 tab daily x 3 days, then 1/2 tab daily x 3 days. 20 tablet 0     predniSONE (DELTASONE) 20 MG tablet Take 3 tabs by mouth daily x 3 days, then 2 tabs daily x 3 days, then 1 tab daily x 3 days, then 1/2 tab daily x 3 days. (Patient not taking: Reported on 1/20/2021) 20 tablet 0     No facility-administered encounter medications on file as of 1/20/2021.              Review Of Systems  Skin: As above  Eyes: negative  Ears/Nose/Throat: negative  Respiratory: No shortness of breath, dyspnea on exertion, cough, or hemoptysis  Cardiovascular: negative  Gastrointestinal: negative  Genitourinary: negative  Musculoskeletal: negative  Neurologic: negative  Psychiatric: negative  Hematologic/Lymphatic/Immunologic: negative  Endocrine: negative      O:   NAD, WDWN, Alert & Oriented, Mood & Affect wnl, Vitals stable   Here today alone   BP (!) 159/83   Pulse 85   SpO2 97%    General appearance normal   Vitals stable   Alert, oriented and in no acute distress     Brown stuck on appule son eyelid sand face      Eyes: Conjunctivae/lids:Normal     ENT: Lips, buccal mucosa, tongue: normal    MSK:Normal    Cardiovascular: peripheral edema none    Pulm: Breathing Normal    Neuro/Psych: Orientation:Alert and Orientedx3 ; Mood/Affect:normal       A/P:  1. DPN  L cheek x5, L eyelid x5, R cheek x5  Cautery performed: After consent, anesthesia with LEC and prep, cautery performed.  No complications and routine wound care.    It was a pleasure speaking to Jen Benedict today.  BENIGN LESIONS DISCUSSED WITH PATIENT:  I discussed the specifics of tumor, prognosis, and genetics of benign lesions.  I explained that treatment of these lesions would be purely cosmetic and not medically neccessary.  I  discussed with patient different removal options including excision, cautery and /or laser.      Cosmetic charge         Again, thank you for allowing me to participate in the care of your patient.        Sincerely,        Vasyl Munoz MD

## 2021-04-21 ENCOUNTER — OFFICE VISIT (OUTPATIENT)
Dept: DERMATOLOGY | Facility: CLINIC | Age: 61
End: 2021-04-21
Payer: COMMERCIAL

## 2021-04-21 VITALS — SYSTOLIC BLOOD PRESSURE: 173 MMHG | HEART RATE: 83 BPM | DIASTOLIC BLOOD PRESSURE: 85 MMHG | OXYGEN SATURATION: 96 %

## 2021-04-21 DIAGNOSIS — L82.1 DERMATOSIS PAPULOSA NIGRA: Primary | ICD-10-CM

## 2021-04-21 PROCEDURE — 96999 UNLISTED SPEC DERM SVC/PX: CPT | Performed by: DERMATOLOGY

## 2021-04-21 NOTE — LETTER
4/21/2021         RE: Jen Benedict  [address] [facility] [address]        Dear Colleague,    Thank you for referring your patient, Jen Benedict, to the [facility]. Please see a copy of my visit note below.    Jen Benedict is an extremely pleasant 60 year old year old female patient here today for evaluation and managment of  DPN on face.  Cosmetic visit. Patient has no other skin complaints today.  Remainder of the HPI, Meds, PMH, Allergies, FH, and SH was reviewed in chart.    No past medical history on file.    Past Surgical History:   Procedure Laterality Date     HYSTERECTOMY TOTAL ABDOMINAL, BILATERAL SALPINGO-OOPHORECTOMY, COMBINED  2002    non-cancer related        Family History   Problem Relation Age of Onset     Hypertension Mother      Diabetes Mother      Rheumatoid Arthritis Mother      Diabetes Sister         Type I       Social History     Socioeconomic History     Marital status:      Spouse name: Not on file     Number of children: Not on file     Years of education: Not on file     Highest education level: Not on file   Occupational History     Not on file   Social Needs     Financial resource strain: Not on file     Food insecurity     Worry: Not on file     Inability: Not on file     Transportation needs     Medical: Not on file     Non-medical: Not on file   Tobacco Use     Smoking status: Never Smoker     Smokeless tobacco: Never Used   Substance and Sexual Activity     Alcohol use: No     Drug use: No     Sexual activity: Never   Lifestyle     Physical activity     Days per week: Not on file     Minutes per session: Not on file     Stress: Not on file   Relationships     Social connections     Talks on phone: Not on file     Gets together: Not on file     Attends Anabaptist service: Not on file     Active member of club or organization: Not on file     Attends meetings of clubs or organizations: Not on file     Relationship status: Not  on file     Intimate partner violence     Fear of current or ex partner: Not on file     Emotionally abused: Not on file     Physically abused: Not on file     Forced sexual activity: Not on file   Other Topics Concern     Parent/sibling w/ CABG, MI or angioplasty before 65F 55M? Not Asked   Social History Narrative     Not on file       Outpatient Encounter Medications as of 4/21/2021   Medication Sig Dispense Refill     ACE/ARB/ARNI NOT PRESCRIBED (INTENTIONAL) Please choose reason not prescribed, below       albuterol (PROAIR HFA/PROVENTIL HFA/VENTOLIN HFA) 108 (90 Base) MCG/ACT inhaler Inhale 2 puffs into the lungs every 6 hours as needed for shortness of breath / dyspnea or wheezing 1 Inhaler 0     atorvastatin (LIPITOR) 20 MG tablet Take 1 tablet (20 mg) by mouth every evening 90 tablet 1     blood glucose (NO BRAND SPECIFIED) lancets standard Use to test blood sugar  daily 100 each 0     blood glucose (NO BRAND SPECIFIED) lancets standard Use to test blood sugar one time daily or as directed. 100 each 1     blood glucose (NO BRAND SPECIFIED) lancets standard Use to test blood sugar daily or as directed. 100 each 1     blood glucose (NO BRAND SPECIFIED) test strip Use to test blood sugar daily 100 strip 0     blood glucose (NO BRAND SPECIFIED) test strip Use to test blood sugar one time daily or as directed. 100 each 1     blood glucose monitoring (NO BRAND SPECIFIED) meter device kit Use to test blood sugar 1 time daily 1 kit 0     blood glucose monitoring (NO BRAND SPECIFIED) meter device kit Use to test blood sugar daily or as directed. 1 kit 0     blood glucose monitoring (NO BRAND SPECIFIED) test strip Use to test blood sugars daily or as directed 100 strip 3     Calcium Carbonate-Vitamin D (CALCIUM + D PO) Take 1,200 mg by mouth daily        clobetasol (TEMOVATE) 0.05 % external ointment Apply topically 2 times daily 60 g 1     DiphenhydrAMINE HCl (BENADRYL PO)        multivitamin, therapeutic with  minerals (MULTI-VITAMIN) TABS tablet Take 1 tablet by mouth daily       omega 3 1000 MG CAPS Take 1 g by mouth 2 times daily 90 capsule      predniSONE (DELTASONE) 20 MG tablet Take 3 tabs by mouth daily x 3 days, then 2 tabs daily x 3 days, then 1 tab daily x 3 days, then 1/2 tab daily x 3 days. (Patient not taking: Reported on 1/20/2021) 20 tablet 0     predniSONE (DELTASONE) 20 MG tablet Take 3 tabs by mouth daily x 3 days, then 2 tabs daily x 3 days, then 1 tab daily x 3 days, then 1/2 tab daily x 3 days. 20 tablet 0     No facility-administered encounter medications on file as of 4/21/2021.              Review Of Systems  Skin: As above  Eyes: negative  Ears/Nose/Throat: negative  Respiratory: No shortness of breath, dyspnea on exertion, cough, or hemoptysis  Cardiovascular: negative  Gastrointestinal: negative  Genitourinary: negative  Musculoskeletal: negative  Neurologic: negative  Psychiatric: negative  Hematologic/Lymphatic/Immunologic: negative  Endocrine: negative      O:   NAD, WDWN, Alert & Oriented, Mood & Affect wnl, Vitals stable   Here today alone   BP (!) 173/85   Pulse 83   SpO2 96%    General appearance normal   Vitals stable   Alert, oriented and in no acute distress     Scaly brown papules on lids and face      Eyes: Conjunctivae/lids:Normal     ENT: Lips, buccal mucosa, tongue: normal    MSK:Normal    Cardiovascular: peripheral edema none    Pulm: Breathing Normal    Neuro/Psych: Orientation:Alert and Orientedx3 ; Mood/Affect:normal       A/P:  1. Dermatosis papulosis nigra  Cosmetic visit   PATIENT NEEEDS TO BE BILLED  26 lesions on orbits and face  Cautery :  After consent, anesthesia with LEC and prep, cautery performed.  No complications and routine wound care.    It was a pleasure speaking to Jen Benedict today.        Again, thank you for allowing me to participate in the care of your patient.        Sincerely,        Vasyl Munoz MD

## 2021-04-21 NOTE — PROGRESS NOTES
Jen Benedict is an extremely pleasant 60 year old year old female patient here today for evaluation and managment of  DPN on face.  Cosmetic visit. Patient has no other skin complaints today.  Remainder of the HPI, Meds, PMH, Allergies, FH, and SH was reviewed in chart.    No past medical history on file.    Past Surgical History:   Procedure Laterality Date     HYSTERECTOMY TOTAL ABDOMINAL, BILATERAL SALPINGO-OOPHORECTOMY, COMBINED  2002    non-cancer related        Family History   Problem Relation Age of Onset     Hypertension Mother      Diabetes Mother      Rheumatoid Arthritis Mother      Diabetes Sister         Type I       Social History     Socioeconomic History     Marital status:      Spouse name: Not on file     Number of children: Not on file     Years of education: Not on file     Highest education level: Not on file   Occupational History     Not on file   Social Needs     Financial resource strain: Not on file     Food insecurity     Worry: Not on file     Inability: Not on file     Transportation needs     Medical: Not on file     Non-medical: Not on file   Tobacco Use     Smoking status: Never Smoker     Smokeless tobacco: Never Used   Substance and Sexual Activity     Alcohol use: No     Drug use: No     Sexual activity: Never   Lifestyle     Physical activity     Days per week: Not on file     Minutes per session: Not on file     Stress: Not on file   Relationships     Social connections     Talks on phone: Not on file     Gets together: Not on file     Attends Gnosticism service: Not on file     Active member of club or organization: Not on file     Attends meetings of clubs or organizations: Not on file     Relationship status: Not on file     Intimate partner violence     Fear of current or ex partner: Not on file     Emotionally abused: Not on file     Physically abused: Not on file     Forced sexual activity: Not on file   Other Topics Concern     Parent/sibling w/ CABG, MI or  angioplasty before 65F 55M? Not Asked   Social History Narrative     Not on file       Outpatient Encounter Medications as of 4/21/2021   Medication Sig Dispense Refill     ACE/ARB/ARNI NOT PRESCRIBED (INTENTIONAL) Please choose reason not prescribed, below       albuterol (PROAIR HFA/PROVENTIL HFA/VENTOLIN HFA) 108 (90 Base) MCG/ACT inhaler Inhale 2 puffs into the lungs every 6 hours as needed for shortness of breath / dyspnea or wheezing 1 Inhaler 0     atorvastatin (LIPITOR) 20 MG tablet Take 1 tablet (20 mg) by mouth every evening 90 tablet 1     blood glucose (NO BRAND SPECIFIED) lancets standard Use to test blood sugar  daily 100 each 0     blood glucose (NO BRAND SPECIFIED) lancets standard Use to test blood sugar one time daily or as directed. 100 each 1     blood glucose (NO BRAND SPECIFIED) lancets standard Use to test blood sugar daily or as directed. 100 each 1     blood glucose (NO BRAND SPECIFIED) test strip Use to test blood sugar daily 100 strip 0     blood glucose (NO BRAND SPECIFIED) test strip Use to test blood sugar one time daily or as directed. 100 each 1     blood glucose monitoring (NO BRAND SPECIFIED) meter device kit Use to test blood sugar 1 time daily 1 kit 0     blood glucose monitoring (NO BRAND SPECIFIED) meter device kit Use to test blood sugar daily or as directed. 1 kit 0     blood glucose monitoring (NO BRAND SPECIFIED) test strip Use to test blood sugars daily or as directed 100 strip 3     Calcium Carbonate-Vitamin D (CALCIUM + D PO) Take 1,200 mg by mouth daily        clobetasol (TEMOVATE) 0.05 % external ointment Apply topically 2 times daily 60 g 1     DiphenhydrAMINE HCl (BENADRYL PO)        multivitamin, therapeutic with minerals (MULTI-VITAMIN) TABS tablet Take 1 tablet by mouth daily       omega 3 1000 MG CAPS Take 1 g by mouth 2 times daily 90 capsule      predniSONE (DELTASONE) 20 MG tablet Take 3 tabs by mouth daily x 3 days, then 2 tabs daily x 3 days, then 1 tab daily  x 3 days, then 1/2 tab daily x 3 days. (Patient not taking: Reported on 1/20/2021) 20 tablet 0     predniSONE (DELTASONE) 20 MG tablet Take 3 tabs by mouth daily x 3 days, then 2 tabs daily x 3 days, then 1 tab daily x 3 days, then 1/2 tab daily x 3 days. 20 tablet 0     No facility-administered encounter medications on file as of 4/21/2021.              Review Of Systems  Skin: As above  Eyes: negative  Ears/Nose/Throat: negative  Respiratory: No shortness of breath, dyspnea on exertion, cough, or hemoptysis  Cardiovascular: negative  Gastrointestinal: negative  Genitourinary: negative  Musculoskeletal: negative  Neurologic: negative  Psychiatric: negative  Hematologic/Lymphatic/Immunologic: negative  Endocrine: negative      O:   NAD, WDWN, Alert & Oriented, Mood & Affect wnl, Vitals stable   Here today alone   BP (!) 173/85   Pulse 83   SpO2 96%    General appearance normal   Vitals stable   Alert, oriented and in no acute distress     Scaly brown papules on lids and face      Eyes: Conjunctivae/lids:Normal     ENT: Lips, buccal mucosa, tongue: normal    MSK:Normal    Cardiovascular: peripheral edema none    Pulm: Breathing Normal    Neuro/Psych: Orientation:Alert and Orientedx3 ; Mood/Affect:normal       A/P:  1. Dermatosis papulosis nigra  Cosmetic visit   PATIENT NEEEDS TO BE BILLED  26 lesions on orbits and face  Cautery :  After consent, anesthesia with LEC and prep, cautery performed.  No complications and routine wound care.    It was a pleasure speaking to Jen Benedict today.

## 2021-08-08 ENCOUNTER — HEALTH MAINTENANCE LETTER (OUTPATIENT)
Age: 61
End: 2021-08-08

## 2021-10-03 ENCOUNTER — HEALTH MAINTENANCE LETTER (OUTPATIENT)
Age: 61
End: 2021-10-03

## 2022-01-23 ENCOUNTER — HEALTH MAINTENANCE LETTER (OUTPATIENT)
Age: 62
End: 2022-01-23

## 2022-03-20 ENCOUNTER — HEALTH MAINTENANCE LETTER (OUTPATIENT)
Age: 62
End: 2022-03-20

## 2022-09-11 ENCOUNTER — HEALTH MAINTENANCE LETTER (OUTPATIENT)
Age: 62
End: 2022-09-11

## 2023-04-30 ENCOUNTER — HEALTH MAINTENANCE LETTER (OUTPATIENT)
Age: 63
End: 2023-04-30

## 2023-12-10 ENCOUNTER — HEALTH MAINTENANCE LETTER (OUTPATIENT)
Age: 63
End: 2023-12-10